# Patient Record
Sex: MALE | Race: WHITE | NOT HISPANIC OR LATINO | Employment: FULL TIME | URBAN - METROPOLITAN AREA
[De-identification: names, ages, dates, MRNs, and addresses within clinical notes are randomized per-mention and may not be internally consistent; named-entity substitution may affect disease eponyms.]

---

## 2017-05-31 ENCOUNTER — GENERIC CONVERSION - ENCOUNTER (OUTPATIENT)
Dept: OTHER | Facility: OTHER | Age: 52
End: 2017-05-31

## 2018-01-10 NOTE — MISCELLANEOUS
Provider Comments  Provider Comments:   PAtient did not show for this appointment      Signatures   Electronically signed by : Freya Schmidt MD; May 31 2017  4:18PM EST                       (Author)

## 2018-01-11 NOTE — RESULT NOTES
Verified Results  (1) CBC/PLT/DIFF [id number] 10:15AM Tiffanie Joseph    Order Number: [id number]     Test Name Result Flag Reference   WBC COUNT 5 77 Thousand/uL  4 31-10 16   RBC COUNT 4 83 Million/uL  3 88-5 62   HEMOGLOBIN 14 9 g/dL  12 0-17 0   HEMATOCRIT 42 1 %  36 5-49 3   MCV 87 fL  82-98   MCH 30 8 pg  26 8-34 3   MCHC 35 4 g/dL  31 4-37 4   RDW 13 4 %  11 6-15 1   MPV 10 0 fL  8 9-12 7   PLATELET COUNT 846 Thousands/uL  149-390   nRBC AUTOMATED 0 /100 WBCs     NEUTROPHILS RELATIVE PERCENT 52 %  43-75   LYMPHOCYTES RELATIVE PERCENT 33 %  14-44   MONOCYTES RELATIVE PERCENT 11 %  4-12   EOSINOPHILS RELATIVE PERCENT 3 %  0-6   BASOPHILS RELATIVE PERCENT 1 %  0-1   NEUTROPHILS ABSOLUTE COUNT 3 02 Thousands/?L  1 85-7 62   LYMPHOCYTES ABSOLUTE COUNT 1 90 Thousands/?L  0 60-4 47   MONOCYTES ABSOLUTE COUNT 0 66 Thousand/?L  0 17-1 22   EOSINOPHILS ABSOLUTE COUNT 0 15 Thousand/?L  0 00-0 61   BASOPHILS ABSOLUTE COUNT 0 03 Thousands/?L  0 00-0 10   - Patient Instructions: This bloodwork is non-fasting  Please drink two glasses of water morning of bloodwork  - Patient Instructions: This bloodwork is non-fasting  Please drink two glasses of water morning of bloodwork  (1) COMPREHENSIVE METABOLIC PANEL [id number] 78:61DM Pablo Weber Order Number: [id number]     Test Name Result Flag Reference   GLUCOSE,RANDM 100 mg/dL     If the patient is fasting, the ADA then defines impaired fasting glucose as > 100 mg/dL and diabetes as > or equal to 123 mg/dL     SODIUM 136 mmol/L  136-145   POTASSIUM 4 1 mmol/L  3 5-5 3   CHLORIDE 104 mmol/L  100-108   CARBON DIOXIDE 25 mmol/L  21-32   ANION GAP (CALC) 7 mmol/L  4-13   BLOOD UREA NITROGEN 14 mg/dL  5-25   CREATININE 0 94 mg/dL  0 60-1 30   Standardized to IDMS reference method   CALCIUM 9 1 mg/dL  8 3-10 1   BILI, TOTAL 0 95 mg/dL  0 20-1 00   ALK PHOSPHATAS 40 U/L L    ALT (SGPT) 35 U/L  12-78   AST(SGOT) 24 U/L  5-45   ALBUMIN 3 9 g/dL  3 5-5 0   TOTAL PROTEIN 7 7 g/dL  6 4-8 2   eGFR Non-African American      >60 0 ml/min/1 73sq m   - Patient Instructions: This is a fasting blood test  Water, black tea or black coffee only after 9:00pm the night before test Drink 2 glasses of water the morning of test   National Kidney Disease Education Program recommendations are as follows:  GFR calculation is accurate only with a steady state creatinine  Chronic Kidney disease less than 60 ml/min/1 73 sq  meters  Kidney failure less than 15 ml/min/1 73 sq  meters  (1) LIPID PANEL FASTING W DIRECT LDL REFLEX [id number] 10:15AM Sanjiv [facility]oft Order Number: [id number]     Test Name Result Flag Reference   CHOLESTEROL 194 mg/dL     LDL CHOLESTEROL CALCULATED 108 mg/dL H 0-100   - Patient Instructions: This is a fasting blood test  Water, black tea or black coffee only after 9:00pm the night before test   Drink 2 glasses of water the morning of test     - Patient Instructions: This is a fasting blood test  Water, black tea or black coffee only after 9:00pm the night before test Drink 2 glasses of water the morning of test   Triglyceride:         Normal              <150 mg/dl       Borderline High    150-199 mg/dl       High               200-499 mg/dl       Very High          >499 mg/dl  Cholesterol:         Desirable        <200 mg/dl      Borderline High  200-239 mg/dl      High             >239 mg/dl  HDL Cholesterol:        High    >59 mg/dL      Low     <41 mg/dL  LDL Cholesterol:        Optimal          <100 mg/dl        Near Optimal     100-129 mg/dl        Above Optimal          Borderline High   130-159 mg/dl          High              160-189 mg/dl          Very High        >189 mg/dl  LDL CALCULATED:    This screening LDL is a calculated result  It does not have the accuracy of the Direct Measured LDL in the monitoring of patients with hyperlipidemia and/or statin therapy     Direct Measure LDL (QGG176) must be ordered separately in these patients  TRIGLYCERIDES 195 mg/dL H <=150   Specimen collection should occur prior to N-Acetylcysteine or Metamizole administration due to the potential for falsely depressed results  HDL,DIRECT 47 mg/dL  40-60   Specimen collection should occur prior to Metamizole administration due to the potential for falsely depressed results  (1) PSA (SCREEN) (Dx V76 44 Screen for Prostate Cancer) [id number] 10:15AM [facility] Order Number: [id number]     Test Name Result Flag Reference   PROSTATE SPECIFIC ANTIGEN 0 4 ng/mL  0 0-4 0   - Patient Instructions: This test is non-fasting  Please drink two glasses of water morning of bloodwork  - Patient Instructions: This test is non-fasting  Please drink two glasses of water morning of bloodwork         Discussion/Summary   cbc is normal cmp is normal liver and kidney function lipids are slightly elevated ldl watch diet especially carbohydrates  and exercise psa is normal

## 2018-01-15 NOTE — PROGRESS NOTES
Assessment    1  Benign essential hypertension (401 1) (I10)   2  Screen for colon cancer (V76 51) (Z12 11)   3  Screening PSA (prostate specific antigen) (V76 44) (Z12 5)   4  Alcohol dependence, continuous drinking behavior (303 91) (F10 20)    Plan  Alcohol dependence, continuous drinking behavior    · US LIVER; Status:Hold For - Scheduling; Requested for:58Kkt9612;   Benign essential hypertension    · Lisinopril 10 MG Oral Tablet; take 1 tablet every day   · EKG/ECG- POC; Status:Complete;   Done: 93CVM6483 08:46AM   · Begin a limited exercise program ; Status:Complete;   Done: 58DGN0670   · Eat no more than 30 grams of fat per day ; Status:Complete;   Done: 20CCG6413   · Keep a diary of when and what you eat ; Status:Complete;   Done: 25XNX8100   · Limit your use of alcohol to 2 drinks or cans of beer a day ; Status:Complete;   Done:  69FQH7820   · Some eating tips that can help you lose weight ; Status:Complete;   Done: 65GAH8118   · We recommend that you bring your body mass index down to 26 ; Status:Complete;    Done: 53DBB4266  Benign essential hypertension, Screening PSA (prostate specific antigen)    · (1) CBC/PLT/DIFF; Status:Active; Requested for:12Wnw3221;    · (1) COMPREHENSIVE METABOLIC PANEL; Status:Active; Requested for:23Tdw7484;    · (1) LIPID PANEL FASTING W DIRECT LDL REFLEX; Status:Active; Requested  for:06Les6561;    · (1) PSA (SCREEN) (Dx V76 44 Screen for Prostate Cancer); Status:Active; Requested  for:52Kfy9970;   Screen for colon cancer    · 2 - *EYVAZ&REILLYCOL ( COLORECTAL SURGERY, GASTROENTEROLOGY) Physician  Referral  Consult  Status: Hold For - Scheduling   Requested for: 91FNE9557  Care Summary provided  : Yes    Discussion/Summary  Impression: health maintenance visit, healthy adult male  Currently, he eats a healthy diet and has an adequate exercise regimen  Prostate cancer screening: the risks and benefits of prostate cancer screening were discussed and PSA was ordered  Colorectal cancer screening: the risks and benefits of colorectal cancer screening were discussed and colonoscopy has been ordered  Screening lab work includes glucose and lipid profile  Advice and education were given regarding nutrition, aerobic exercise and alcohol use  Patient discussion: discussed with the patient  1  HTN IH EKG showing a normal sinus rhythm will start the Lisinopril 10 mg daily and come back next week for a blood pressure recheck   2  Screening CRC and labs ordered   3  Alcohol use discussed with years of drinking risks and benefits of continued drinking and risks of cirrhosis has not ever had any scans on his liver  Possible side effects of new medications were reviewed with the patient/guardian today  Chief Complaint  Check up      History of Present Illness  HM, Adult Male: The patient is being seen for a health maintenance evaluation  The last health maintenance visit was 5 year(s) ago  General Health: The patient's health since the last visit is described as good  He has regular dental visits  He denies vision problems  He denies hearing loss  Immunizations status: up to date  Lifestyle:  He consumes a diverse and healthy diet  He does not have any weight concerns  He exercises regularly  He does not use tobacco  He consumes alcohol  He reports frequent alcohol use and drinking 6 drinks per day  He typically drinks beer  Alcohol concern: tolerance to alcohol, but no attempts to cut alcohol use, no personal concern about alcohol use, no annoyance by criticism of alcohol use, no morning drinking, no family concern about alcohol use and does not feel guilty about alcohol use  He is not ready to quit drinking  He denies drug use  Reproductive health:  the patient is sexually active  birth control is not being practiced  He denies erectile dysfunction  Screening: cancer screening reviewed and current  Prostate cancer screening includes no previous evaluation   Colorectal cancer screening includes no previous screening  Metabolic screening includes uncertain timing of his last lipid profile and uncertain timing of his last glucose screening  Cardiovascular risk factors: hypertension and obesity  HPI: Patient here for a check up and has no present complaints  Patient reports that he does drink about a six pack a day for several years  l Stopped smoking about 25 years ago went on for about 10 years  Hypertension (Follow-Up): The patient presents for follow-up of essential hypertension  The patient states he has been doing poorly with his blood pressure control since the last visit  He has no comorbid illnesses  He has no significant interval events  Symptoms: The patient is currently asymptomatic  Associated symptoms include no headache, no focal neurologic deficits and no memory loss  Home monitoring: The patient checks his blood pressure regularly  Blood pressure control has been poor  Medications: The patient is not currently on any medications for his hypertension--lifestyle modification only  Disease Management: the patient is not doing well with his blood pressure goals  The patient is due for a lipid panel, a serum creatinine, an eye exam and an ECG  Review of Systems    Constitutional: No fever or chills, feels well, no tiredness, no recent weight gain or weight loss  Eyes: No complaints of eye pain, no red eyes, no discharge from eyes, no itchy eyes  ENT: no complaints of earache, no hearing loss, no nosebleeds, no nasal discharge, no sore throat, no hoarseness  Cardiovascular: as noted in HPI  Respiratory: No complaints of shortness of breath, no wheezing, no cough, no SOB on exertion, no orthopnea or PND  Gastrointestinal: No complaints of abdominal pain, no constipation, no nausea or vomiting, no diarrhea or bloody stools     Genitourinary: No complaints of dysuria, no incontinence, no hesitancy, no nocturia, no genital lesion, no testicular pain    Musculoskeletal: No complaints of arthralgia, no myalgias, no joint swelling or stiffness, no limb pain or swelling  Integumentary: No complaints of skin rash or skin lesions, no itching, no skin wound, no dry skin  Neurological: No compliants of headache, no confusion, no convulsions, no numbness or tingling, no dizziness or fainting, no limb weakness, no difficulty walking  Psychiatric: Is not suicidal, no sleep disturbances, no anxiety or depression, no change in personality, no emotional problems  Endocrine: No complaints of proptosis, no hot flashes, no muscle weakness, no erectile dysfunction, no deepening of the voice, no feelings of weakness  Hematologic/Lymphatic: No complaints of swollen glands, no swollen glands in the neck, does not bleed easily, no easy bruising  Active Problems    1  Benign essential hypertension (401 1) (I10)    Past Medical History    · History of Candidiasis, cutaneous (112 3) (B37 2)   · History of Cellulitis Of The Abdominal Wall (682 2)   · 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)   · History of Irreducible umbilical hernia (838 9) (K42 0)   · History of Pneumonia (V12 61)    Surgical History    · History of Shoulder Surgery   · History of Umbilical Hernia Repair    Social History    · Being A Social Drinker    Current Meds   1  Multi Vitamin Mens Oral Tablet; Therapy: (Recorded:52Dnb6502) to Recorded    Allergies    1  No Known Drug Allergies    Vitals   Recorded: 10FLK5978 85:61AZ   Systolic 089   Diastolic 367   Heart Rate 78   Temperature 96 6 F   O2 Saturation 97   Height 5 ft 11 in   Weight 259 lb 4 00 oz   BMI Calculated 36 16   BSA Calculated 2 35     Physical Exam    Constitutional   General appearance: No acute distress, well appearing and well nourished  obese  Eyes   Conjunctiva and lids: No swelling, erythema, or discharge  Pupils and irises: Equal, round and reactive to light      Ears, Nose, Mouth, and Throat   External inspection of ears and nose: Normal     Otoscopic examination: Tympanic membrance translucent with normal light reflex  Canals patent without erythema  Oropharynx: Normal with no erythema, edema, exudate or lesions  Pulmonary   Respiratory effort: No increased work of breathing or signs of respiratory distress  Auscultation of lungs: Clear to auscultation  Cardiovascular   Auscultation of heart: Normal rate and rhythm, normal S1 and S2, without murmurs  Examination of extremities for edema and/or varicosities: Normal     Abdomen   Abdomen: Non-tender, no masses  Liver and spleen: No hepatomegaly or splenomegaly  Lymphatic   Palpation of lymph nodes in neck: No lymphadenopathy  Musculoskeletal   Gait and station: Normal     Skin   Skin and subcutaneous tissue: Normal without rashes or lesions  Psychiatric   Orientation to person, place and time: Normal     Mood and affect: Normal        Results/Data  PHQ-9 Adult Depression Screening 75Ilu9265 09:10AM User, [employer]     Test Name Result Flag Reference   PHQ-9 Adult Depression Score 0     Q1: 0, Q2: 0, Q3: 0, Q4: 0, Q5: 0, Q6: 0, Q7: 0, Q8: 0, Q9: 0   PHQ-9 Adult Depression Screening Negative     PHQ-9 Difficulty Level Not difficult at all     PHQ-9 Severity No Depression       EKG/ECG- POC 54LAL8196 08:46AM Veto Gens     Test Name Result Flag Reference   EKG/ECG See Scanned Copy         Signatures   Electronically signed by : SHAWNEE Kwok;  Aug  5 2016  9:13AM EST                       (Author)    Electronically signed by : Laura Russell MD; Aug  5 2016 11:44AM EST                       (Co-author)

## 2018-01-17 NOTE — RESULT NOTES
Verified Results  [address] [place] [address] [id number] 08:57AM Vimal Docker Order Number: [id number]    - Patient Instructions: To schedule this appointment, please contact Central Scheduling at [phone]  Test Name Result Flag Reference   US ABDOMEN LIMITED (Report)     RIGHT UPPER QUADRANT ULTRASOUND     INDICATION: Alcohol dependence, uncomplicated  COMPARISON: None     TECHNIQUE:  Real-time ultrasound of the right upper quadrant was performed with a curvilinear transducer with both volumetric sweeps and still imaging techniques  FINDINGS:     PANCREAS: Visualized portions of the pancreas are within normal limits  AORTA AND IVC: Visualized portions are normal for patient age  LIVER:   Size: Enlarged  The liver measures 20 3 cm in the midclavicular line  Contour: Surface contour is smooth  Parenchyma: Increased echogenicity consistent with fatty infiltration versus hepatic parenchymal disease of other etiology  No evidence of suspicious mass  The main portal vein is patent and hepatopetal       BILIARY:   The gallbladder is normal in caliber  No wall thickening or pericholecystic fluid  No stones or sludge identified  No sonographic Booth's sign  No intrahepatic biliary dilatation  CBD measures 3 mm  No choledocholithiasis  KIDNEY:    Right kidney measures 11 6 x 6 6 cm  Within normal limits  ASCITES:  None  IMPRESSION:     Enlarged echogenic liver consistent with fatty infiltration versus hepatic parenchymal disease of other etiology  No focal mass detected         Workstation performed: [id number]     Signed by:   Joaquim Acosta DO   9/12/16       Discussion/Summary   some fatty liver disease but otherwsie is normal liver no masses

## 2018-06-04 ENCOUNTER — OFFICE VISIT (OUTPATIENT)
Dept: FAMILY MEDICINE CLINIC | Facility: CLINIC | Age: 53
End: 2018-06-04
Payer: COMMERCIAL

## 2018-06-04 VITALS
WEIGHT: 231 LBS | SYSTOLIC BLOOD PRESSURE: 160 MMHG | DIASTOLIC BLOOD PRESSURE: 100 MMHG | HEART RATE: 62 BPM | RESPIRATION RATE: 18 BRPM | HEIGHT: 71 IN | TEMPERATURE: 97.5 F | BODY MASS INDEX: 32.34 KG/M2 | OXYGEN SATURATION: 97 %

## 2018-06-04 DIAGNOSIS — Z12.5 PROSTATE CANCER SCREENING: ICD-10-CM

## 2018-06-04 DIAGNOSIS — E66.9 OBESITY (BMI 30-39.9): ICD-10-CM

## 2018-06-04 DIAGNOSIS — Z13.220 LIPID SCREENING: ICD-10-CM

## 2018-06-04 DIAGNOSIS — I10 ESSENTIAL HYPERTENSION: Primary | ICD-10-CM

## 2018-06-04 PROCEDURE — 99214 OFFICE O/P EST MOD 30 MIN: CPT | Performed by: NURSE PRACTITIONER

## 2018-06-04 RX ORDER — LISINOPRIL AND HYDROCHLOROTHIAZIDE 25; 20 MG/1; MG/1
1 TABLET ORAL DAILY
Qty: 30 TABLET | Refills: 0 | Status: SHIPPED | OUTPATIENT
Start: 2018-06-04 | End: 2018-07-01 | Stop reason: SDUPTHER

## 2018-06-04 NOTE — PATIENT INSTRUCTIONS
Hypertension- uncontrolled  Start combination pill  Limit salt  Daily exercise  Check labs  We will all you with results  Follow up in 3 weeks for BP check  DASH Eating Plan   WHAT YOU NEED TO KNOW:   What is the DASH Eating Plan? The DASH (Dietary Approaches to Stop Hypertension) Eating Plan is designed to help prevent or lower high blood pressure  It can also help to lower LDL (bad) cholesterol and decrease your risk for heart disease  The plan is low in sodium, sugar, unhealthy fats, and total fat  It is high in potassium, calcium, magnesium, and fiber  These nutrients are added when you eat more fruits, vegetables, and whole grains  What is my sodium limit for each day? Your dietitian will tell you how much sodium is safe for you to have each day  People with high blood pressure should have no more than 1,500 to 2,300 mg of sodium in a day  A teaspoon (tsp) of salt has 2,300 mg of sodium  This may seem like a difficult goal, but small changes to the foods you eat can make a big difference  Your healthcare provider or dietitian can help you create a meal plan that follows your sodium limit  How do I limit sodium? · Read food labels  Food labels can help you choose foods that are low in sodium  The amount of sodium is listed in milligrams (mg)  The % Daily Value (DV) column tells you how much of your daily needs are met by 1 serving of the food for each nutrient listed  Choose foods that have less than 5% of the DV of sodium  These foods are considered low in sodium  Foods that have 20% or more of the DV of sodium are considered high in sodium  Avoid foods that have more than 300 mg of sodium in each serving  Choose foods that say low-sodium, reduced-sodium, or no salt added on the food label  · Avoid salt  Do not salt food at the table, and add very little salt to foods during cooking  Use herbs and spices, such as onions, garlic, and salt-free seasonings to add flavor to foods   Try lemon or lime juice or vinegar to give foods a tart flavor  Use hot peppers or a small amount of hot pepper sauce to add a spicy flavor to foods  · Ask about salt substitutes  Ask your healthcare provider if you may use salt substitutes  Some salt substitutes have ingredients that can be harmful if you have certain health conditions  · Choose foods carefully at restaurants  Meals from restaurants, especially fast food restaurants, are often high in sodium  Some restaurants have nutrition information that tells you the amount of sodium in their foods  Ask to have your food prepared with less, or no salt  What should I know about fats? · Include healthy fats  Examples are unsaturated fats and omega-3 fatty acids  Unsaturated fats are found in soybean, canola, olive, or sunflower oil, and liquid and soft tub margarines  Omega-3 fatty acids are found in fatty fish, such as salmon, tuna, mackerel, and sardines  It is also found in flaxseed oil and ground flaxseed  · Avoid unhealthy fats  Do not eat unhealthy fats, such as saturated fats and trans fats  Saturated fats are found in foods that contain fat from animals  Examples are fatty meats, whole milk, butter, cream, and other dairy foods  It is also found in shortening, stick margarine, palm oil, and coconut oil  Trans fats are found in fried foods, crackers, chips, and baked goods made with margarine or shortening  Which foods should I include? With the DASH eating plan, you need to eat a certain number of servings from each food group  This will help you get enough of certain nutrients and limit others  The amount of servings you should eat depends on how many calories you need  Your dietitian can tell you how many calories you need  The number of servings listed next to the food groups below are for people who need about 2,000 calories each day    · Grains:  6 to 8 servings (3 of these servings should be whole-grain foods)    ¨ 1 slice of whole-grain bread     ¨ 1 ounce of dry cereal    ¨ ½ cup of cooked cereal, pasta, or brown rice    · Vegetables and fruits:  4 to 5 servings of fruits and 4 to 5 servings of vegetables    ¨ 1 medium fruit    ¨ 1 cup of raw leafy vegetable    ¨ ½ cup of frozen, canned (no added salt), or chopped fresh vegetables     ¨ ½ cup of fresh, frozen, dried, or canned fruit (canned in light syrup or fruit juice)    ¨ ½ cup of vegetable or fruit juice    · Dairy:  2 to 3 servings    ¨ 1 cup of nonfat (skim) or 1% milk    ¨ 1½ ounces of fat-free or low-fat, low-sodium cheese    ¨ 6 ounces of nonfat or low-fat yogurt    · Lean meat, poultry, and fish:  6 ounces or less    [employer] (chicken, turkey) with no skin    ¨ Fish (especially fatty fish, such as salmon, fresh tuna, or mackerel)    ¨ Lean beef and pork (loin, round, extra lean hamburger)    ¨ Egg whites and egg substitutes    · Nuts, seeds, and legumes:  4 to 5 servings each week    ¨ ½ cup of cooked beans and peas    ¨ 1½ ounces of unsalted nuts    ¨ 2 tablespoons of peanut butter or seeds    · Sweets and added sugars:  5 or less each week    ¨ 1 tablespoon of sugar, jelly, or jam    ¨ ½ cup of sorbet or gelatin    ¨ 1 cup of lemonade    · Fats:  2 to 3 servings each week    ¨ 1 teaspoon of soft margarine or vegetable oil    ¨ 1 tablespoon of mayonnaise    ¨ 2 tablespoons of salad dressing  Which foods should I avoid?    · Grains:      [employer], such as doughnuts, pastries, cookies, and biscuits (high in fat and sugar)    ¨ Mixes for cornbread and biscuits, packaged foods, such as bread stuffing, rice and pasta mixes, macaroni and cheese, and instant cereals (high in sodium)    · Fruits and vegetables:      ¨ Regular, canned vegetables (high in sodium)    ¨ Sauerkraut, pickled vegetables, and other foods prepared in brine (high in sodium)    ¨ Fried vegetables or vegetables in butter or high-fat sauces    ¨ Fruit in cream or butter sauce (high in fat)    · Dairy: ¨ Whole milk, 2% milk, and cream (high in fat)    ¨ Regular cheese and processed cheese (high in fat and sodium)    · Meats and protein foods:      ¨ Smoked or cured meat, such as corned beef, de los santos, ham, hot dogs, and sausage (high in fat and sodium)    ¨ Canned beans and canned meats or spreads, such as potted meats, sardines, anchovies, and imitation seafood (high in sodium)    ¨ Deli or lunch meats, such as bologna, ham, turkey, and roast beef (high in sodium)    ¨ High-fat meat (T-bone steak, regular hamburger, and ribs)    ¨ Whole eggs and egg yolks (high in fat)    · Other:      ¨ Seasonings made with salt, such as garlic salt, celery salt, onion salt, seasoned salt, meat tenderizers, and monosodium glutamate (MSG)    ¨ Miso soup and canned or dried soup mixes (high in sodium)    ¨ Regular soy sauce, barbecue sauce, teriyaki sauce, steak sauce, Worcestershire sauce, and most flavored vinegars (high in sodium)    ¨ Regular condiments, such as mustard, ketchup, and salad dressings (high in sodium)    ¨ Gravy and sauces, such as Daniel or cheese sauces (high in sodium and fat)    ¨ Drinks high in sugar, such as soda or fruit drinks    [employer] foods, such as salted chips, popcorn, pretzels, pork rinds, salted crackers, and salted nuts    ¨ Frozen foods, such as dinners, entrees, vegetables with sauces, and breaded meats (high in sodium)  What other guidelines should I follow? · Maintain a healthy weight  Your risk for heart disease is higher if you are overweight  Your healthcare provider may suggest that you lose weight if you are overweight  You can lose weight by eating fewer calories and foods that have added sugars and fat  The DASH meal plan can help you do this  Decrease calories by eating smaller portions at each meal and fewer snacks  Ask your healthcare provider for more information about how to lose weight  · Exercise regularly    Regular exercise can help you reach or maintain a healthy weight  Regular exercise can also help decrease your blood pressure and improve your cholesterol levels  Get 30 minutes or more of moderate exercise each day of the week  To lose weight, get at least 60 minutes of exercise  Talk to your healthcare provider about the best exercise program for you  · Limit alcohol  Women should limit alcohol to 1 drink a day  Men should limit alcohol to 2 drinks a day  A drink of alcohol is 12 ounces of beer, 5 ounces of wine, or 1½ ounces of liquor  Where can I find more information? · [facility] and [address]  [address] [address]  [place] [address]  Phone: [phone]  Web Address: [facility] no  CARE AGREEMENT:   You have the right to help plan your care  Discuss treatment options with your caregivers to decide what care you want to receive  You always have the right to refuse treatment  The above information is an  only  It is not intended as medical advice for individual conditions or treatments  Talk to your doctor, nurse or pharmacist before following any medical regimen to see if it is safe and effective for you  © 2017 [employer] Information is for End User's use only and may not be sold, redistributed or otherwise used for commercial purposes  All illustrations and images included in CareNotes® are the copyrighted property of A D A M , Inc  or Angel Sandoval

## 2018-06-04 NOTE — PROGRESS NOTES
Assessment/Plan:    No problem-specific Assessment & Plan notes found for this encounter  Diagnoses and all orders for this visit:    Essential hypertension  -     Comprehensive metabolic panel; Future  -     Lipid panel; Future  -     PSA, Total Screen; Future  -     lisinopril-hydrochlorothiazide (PRINZIDE,ZESTORETIC) 20-25 MG per tablet; Take 1 tablet by mouth daily for 30 days    Obesity (BMI 30-39 9)  -     Comprehensive metabolic panel; Future  -     Lipid panel; Future  -     PSA, Total Screen; Future    Lipid screening  -     Comprehensive metabolic panel; Future  -     Lipid panel; Future  -     PSA, Total Screen; Future    Prostate cancer screening  -     Comprehensive metabolic panel; Future  -     Lipid panel; Future  -     PSA, Total Screen; Future          Subjective:      Patient ID: Trang Yen is a 47 y o  male  Pt here for follow up for htn  Pt was started on BP meds over a year ago  BP is not controlled  He has been having intermittent headaches  He also has been having dizziness and his equilibrium   Over the past year, 4-5 episodes of vomitting after eating a lunch meal at work  This would happen within 2 hours of consuming a meal  He did change his diet after the last episode  He quit drinking alcohol  He lost 40 pounds in three months by cutting out alcohol and making minor dietary changes  He does not exercise  The following portions of the patient's history were reviewed and updated as appropriate:   He  has a past medical history of Hypertension  He   Patient Active Problem List    Diagnosis Date Noted    Essential hypertension 06/04/2018    Obesity (BMI 30-39 9) 06/04/2018    Lipid screening 06/04/2018    Prostate cancer screening 06/04/2018     He  has a past surgical history that includes Shoulder surgery; Hernia repair; and Colonoscopy (N/A, 9/16/2016)  His family history is not on file  He  reports that he has never smoked   He has never used smokeless tobacco  He reports that he does not drink alcohol or use drugs  Current Outpatient Prescriptions   Medication Sig Dispense Refill    lisinopril-hydrochlorothiazide (PRINZIDE,ZESTORETIC) 20-25 MG per tablet Take 1 tablet by mouth daily for 30 days 30 tablet 0     No current facility-administered medications for this visit  Current Outpatient Prescriptions on File Prior to Visit   Medication Sig    [DISCONTINUED] lisinopril (ZESTRIL) 10 mg tablet Take 10 mg by mouth daily  No current facility-administered medications on file prior to visit  He has No Known Allergies       Review of Systems   Constitutional: Negative for fatigue and fever  HENT: Negative for congestion  Eyes: Negative for visual disturbance  Respiratory: Negative for cough and shortness of breath  Cardiovascular: Negative for chest pain, palpitations and leg swelling  Gastrointestinal: Negative for abdominal distention and abdominal pain  Endocrine: Negative for cold intolerance, polydipsia and polyuria  Genitourinary: Negative for difficulty urinating  Musculoskeletal: Negative for back pain and joint swelling  Skin: Negative for color change and rash  Allergic/Immunologic: Negative for immunocompromised state  Neurological: Negative for dizziness and headaches  Hematological: Negative for adenopathy  Psychiatric/Behavioral: Negative for behavioral problems and sleep disturbance  All other systems reviewed and are negative  Objective:      /100 (BP Location: Right arm, Patient Position: Sitting)   Pulse 62   Temp 97 5 °F (36 4 °C)   Resp 18   Ht 5' 11" (1 803 m)   Wt 105 kg (231 lb)   SpO2 97%   BMI 32 22 kg/m²          Physical Exam   Constitutional: He is oriented to person, place, and time  He appears well-developed and well-nourished  HENT:   Head: Normocephalic and atraumatic     Mouth/Throat: Oropharynx is clear and moist    Eyes: Conjunctivae and EOM are normal  Pupils are equal, round, and reactive to light  Neck: Normal range of motion  Cardiovascular: Normal rate, regular rhythm and normal heart sounds  Exam reveals no gallop and no friction rub  No murmur heard  Pulmonary/Chest: Effort normal and breath sounds normal  No respiratory distress  Abdominal: Soft  There is no tenderness  Musculoskeletal: Normal range of motion  Lymphadenopathy:     He has no cervical adenopathy  Neurological: He is alert and oriented to person, place, and time  Skin: Skin is warm and dry  Psychiatric: He has a normal mood and affect  His behavior is normal  Judgment and thought content normal    Nursing note and vitals reviewed

## 2018-07-01 DIAGNOSIS — I10 ESSENTIAL HYPERTENSION: ICD-10-CM

## 2018-07-02 RX ORDER — LISINOPRIL AND HYDROCHLOROTHIAZIDE 25; 20 MG/1; MG/1
TABLET ORAL
Qty: 30 TABLET | Refills: 0 | Status: SHIPPED | OUTPATIENT
Start: 2018-07-02 | End: 2018-08-08 | Stop reason: SDUPTHER

## 2018-08-03 ENCOUNTER — OFFICE VISIT (OUTPATIENT)
Dept: FAMILY MEDICINE CLINIC | Facility: CLINIC | Age: 53
End: 2018-08-03
Payer: COMMERCIAL

## 2018-08-03 VITALS
DIASTOLIC BLOOD PRESSURE: 84 MMHG | SYSTOLIC BLOOD PRESSURE: 118 MMHG | TEMPERATURE: 97.9 F | WEIGHT: 221 LBS | OXYGEN SATURATION: 98 % | HEIGHT: 71 IN | BODY MASS INDEX: 30.94 KG/M2 | HEART RATE: 77 BPM

## 2018-08-03 DIAGNOSIS — I10 ESSENTIAL HYPERTENSION: Primary | ICD-10-CM

## 2018-08-03 DIAGNOSIS — Z12.2 ENCOUNTER FOR SCREENING FOR LUNG CANCER: ICD-10-CM

## 2018-08-03 DIAGNOSIS — Z11.59 ENCOUNTER FOR HEPATITIS C SCREENING TEST FOR LOW RISK PATIENT: ICD-10-CM

## 2018-08-03 DIAGNOSIS — Z12.5 SCREENING FOR PROSTATE CANCER: ICD-10-CM

## 2018-08-03 DIAGNOSIS — E66.9 OBESITY (BMI 30-39.9): ICD-10-CM

## 2018-08-03 PROBLEM — Z13.220 LIPID SCREENING: Status: RESOLVED | Noted: 2018-06-04 | Resolved: 2018-08-03

## 2018-08-03 PROCEDURE — 3079F DIAST BP 80-89 MM HG: CPT | Performed by: NURSE PRACTITIONER

## 2018-08-03 PROCEDURE — 99214 OFFICE O/P EST MOD 30 MIN: CPT | Performed by: NURSE PRACTITIONER

## 2018-08-03 PROCEDURE — 3074F SYST BP LT 130 MM HG: CPT | Performed by: NURSE PRACTITIONER

## 2018-08-03 PROCEDURE — 3008F BODY MASS INDEX DOCD: CPT | Performed by: NURSE PRACTITIONER

## 2018-08-03 PROCEDURE — 1036F TOBACCO NON-USER: CPT | Performed by: NURSE PRACTITIONER

## 2018-08-08 DIAGNOSIS — I10 ESSENTIAL HYPERTENSION: ICD-10-CM

## 2018-08-08 RX ORDER — LISINOPRIL AND HYDROCHLOROTHIAZIDE 25; 20 MG/1; MG/1
TABLET ORAL
Qty: 30 TABLET | Refills: 0 | Status: SHIPPED | OUTPATIENT
Start: 2018-08-08 | End: 2018-09-09 | Stop reason: SDUPTHER

## 2018-08-31 ENCOUNTER — APPOINTMENT (OUTPATIENT)
Dept: LAB | Facility: MEDICAL CENTER | Age: 53
End: 2018-08-31
Payer: COMMERCIAL

## 2018-08-31 ENCOUNTER — HOSPITAL ENCOUNTER (OUTPATIENT)
Dept: RADIOLOGY | Facility: MEDICAL CENTER | Age: 53
Discharge: HOME | End: 2018-08-31
Payer: COMMERCIAL

## 2018-08-31 DIAGNOSIS — Z12.5 PROSTATE CANCER SCREENING: ICD-10-CM

## 2018-08-31 DIAGNOSIS — Z12.5 SCREENING FOR PROSTATE CANCER: ICD-10-CM

## 2018-08-31 DIAGNOSIS — R76.8 POSITIVE HEPATITIS C ANTIBODY TEST: Primary | ICD-10-CM

## 2018-08-31 DIAGNOSIS — Z13.220 LIPID SCREENING: ICD-10-CM

## 2018-08-31 DIAGNOSIS — E66.9 OBESITY (BMI 30-39.9): ICD-10-CM

## 2018-08-31 DIAGNOSIS — Z12.2 ENCOUNTER FOR SCREENING FOR LUNG CANCER: ICD-10-CM

## 2018-08-31 DIAGNOSIS — Z11.59 ENCOUNTER FOR HEPATITIS C SCREENING TEST FOR LOW RISK PATIENT: ICD-10-CM

## 2018-08-31 DIAGNOSIS — I10 ESSENTIAL HYPERTENSION: ICD-10-CM

## 2018-08-31 LAB
ALBUMIN SERPL BCP-MCNC: 3.8 G/DL (ref 3.5–5)
ALP SERPL-CCNC: 31 U/L (ref 46–116)
ALT SERPL W P-5'-P-CCNC: 25 U/L (ref 12–78)
ANION GAP SERPL CALCULATED.3IONS-SCNC: 7 MMOL/L (ref 4–13)
AST SERPL W P-5'-P-CCNC: 18 U/L (ref 5–45)
BASOPHILS # BLD AUTO: 0.02 THOUSANDS/ΜL (ref 0–0.1)
BASOPHILS NFR BLD AUTO: 0 % (ref 0–1)
BILIRUB SERPL-MCNC: 1.14 MG/DL (ref 0.2–1)
BUN SERPL-MCNC: 21 MG/DL (ref 5–25)
CALCIUM SERPL-MCNC: 9 MG/DL (ref 8.3–10.1)
CHLORIDE SERPL-SCNC: 103 MMOL/L (ref 100–108)
CHOLEST SERPL-MCNC: 163 MG/DL (ref 50–200)
CO2 SERPL-SCNC: 27 MMOL/L (ref 21–32)
CREAT SERPL-MCNC: 0.82 MG/DL (ref 0.6–1.3)
EOSINOPHIL # BLD AUTO: 0.13 THOUSAND/ΜL (ref 0–0.61)
EOSINOPHIL NFR BLD AUTO: 3 % (ref 0–6)
ERYTHROCYTE [DISTWIDTH] IN BLOOD BY AUTOMATED COUNT: 13 % (ref 11.6–15.1)
GFR SERPL CREATININE-BSD FRML MDRD: 102 ML/MIN/1.73SQ M
GLUCOSE P FAST SERPL-MCNC: 107 MG/DL (ref 65–99)
HCT VFR BLD AUTO: 43.2 % (ref 36.5–49.3)
HCV AB SER QL: ABNORMAL
HDLC SERPL-MCNC: 42 MG/DL (ref 40–60)
HGB BLD-MCNC: 14.7 G/DL (ref 12–17)
IMM GRANULOCYTES # BLD AUTO: 0.01 THOUSAND/UL (ref 0–0.2)
IMM GRANULOCYTES NFR BLD AUTO: 0 % (ref 0–2)
LDLC SERPL CALC-MCNC: 101 MG/DL (ref 0–100)
LYMPHOCYTES # BLD AUTO: 1.76 THOUSANDS/ΜL (ref 0.6–4.47)
LYMPHOCYTES NFR BLD AUTO: 35 % (ref 14–44)
MCH RBC QN AUTO: 29.4 PG (ref 26.8–34.3)
MCHC RBC AUTO-ENTMCNC: 34 G/DL (ref 31.4–37.4)
MCV RBC AUTO: 86 FL (ref 82–98)
MONOCYTES # BLD AUTO: 0.76 THOUSAND/ΜL (ref 0.17–1.22)
MONOCYTES NFR BLD AUTO: 15 % (ref 4–12)
NEUTROPHILS # BLD AUTO: 2.39 THOUSANDS/ΜL (ref 1.85–7.62)
NEUTS SEG NFR BLD AUTO: 47 % (ref 43–75)
NRBC BLD AUTO-RTO: 0 /100 WBCS
PLATELET # BLD AUTO: 202 THOUSANDS/UL (ref 149–390)
PMV BLD AUTO: 10 FL (ref 8.9–12.7)
POTASSIUM SERPL-SCNC: 3.9 MMOL/L (ref 3.5–5.3)
PROT SERPL-MCNC: 7.3 G/DL (ref 6.4–8.2)
PSA SERPL-MCNC: 0.5 NG/ML (ref 0–4)
RBC # BLD AUTO: 5 MILLION/UL (ref 3.88–5.62)
SODIUM SERPL-SCNC: 137 MMOL/L (ref 136–145)
TRIGL SERPL-MCNC: 100 MG/DL
WBC # BLD AUTO: 5.07 THOUSAND/UL (ref 4.31–10.16)

## 2018-08-31 PROCEDURE — 85025 COMPLETE CBC W/AUTO DIFF WBC: CPT

## 2018-08-31 PROCEDURE — 80053 COMPREHEN METABOLIC PANEL: CPT

## 2018-08-31 PROCEDURE — 80061 LIPID PANEL: CPT

## 2018-08-31 PROCEDURE — 86803 HEPATITIS C AB TEST: CPT

## 2018-08-31 PROCEDURE — 36415 COLL VENOUS BLD VENIPUNCTURE: CPT

## 2018-08-31 PROCEDURE — G0103 PSA SCREENING: HCPCS

## 2018-09-09 DIAGNOSIS — I10 ESSENTIAL HYPERTENSION: ICD-10-CM

## 2018-09-10 RX ORDER — LISINOPRIL AND HYDROCHLOROTHIAZIDE 25; 20 MG/1; MG/1
TABLET ORAL
Qty: 30 TABLET | Refills: 0 | Status: SHIPPED | OUTPATIENT
Start: 2018-09-10 | End: 2018-10-14 | Stop reason: SDUPTHER

## 2018-10-14 DIAGNOSIS — I10 ESSENTIAL HYPERTENSION: ICD-10-CM

## 2018-10-14 RX ORDER — LISINOPRIL AND HYDROCHLOROTHIAZIDE 25; 20 MG/1; MG/1
TABLET ORAL
Qty: 30 TABLET | Refills: 0 | Status: SHIPPED | OUTPATIENT
Start: 2018-10-14 | End: 2018-11-12 | Stop reason: SDUPTHER

## 2018-10-15 ENCOUNTER — OFFICE VISIT (OUTPATIENT)
Dept: FAMILY MEDICINE CLINIC | Facility: CLINIC | Age: 53
End: 2018-10-15
Payer: COMMERCIAL

## 2018-10-15 VITALS
HEIGHT: 71 IN | TEMPERATURE: 97.9 F | RESPIRATION RATE: 16 BRPM | BODY MASS INDEX: 31.67 KG/M2 | OXYGEN SATURATION: 96 % | SYSTOLIC BLOOD PRESSURE: 124 MMHG | HEART RATE: 74 BPM | DIASTOLIC BLOOD PRESSURE: 76 MMHG | WEIGHT: 226.2 LBS

## 2018-10-15 DIAGNOSIS — B08.4 HAND, FOOT AND MOUTH DISEASE: Primary | ICD-10-CM

## 2018-10-15 PROCEDURE — 99213 OFFICE O/P EST LOW 20 MIN: CPT | Performed by: NURSE PRACTITIONER

## 2018-10-15 PROCEDURE — 3008F BODY MASS INDEX DOCD: CPT | Performed by: NURSE PRACTITIONER

## 2018-10-15 NOTE — PROGRESS NOTES
Assessment/Plan:    No problem-specific Assessment & Plan notes found for this encounter  Diagnoses and all orders for this visit:    Hand, foot and mouth disease  Comments:  DW patient symptoms are viral and treatment is supportive and good hand washing           Subjective:      Patient ID: Lizandro Mosley is a 47 y o  male  Patient here with complaints that started with sore throat and hurting to swallow and touching his throat no other symptoms no sick contacts started yesterday       Sore Throat    Pertinent negatives include no abdominal pain, congestion, coughing, diarrhea, ear pain, shortness of breath or vomiting  The following portions of the patient's history were reviewed and updated as appropriate:   He  has a past medical history of Hypertension; Inner ear dysfunction; Irreducible umbilical hernia; and Pneumonia  He   Patient Active Problem List    Diagnosis Date Noted    Hand, foot and mouth disease 10/15/2018    Encounter for screening for lung cancer 08/03/2018    Essential hypertension 06/04/2018    Obesity (BMI 30-39 9) 06/04/2018    Screening for prostate cancer 06/04/2018     He  has a past surgical history that includes Shoulder surgery; Hernia repair; and Colonoscopy (N/A, 9/16/2016)  His family history is not on file  He  reports that he has quit smoking  He has never used smokeless tobacco  He reports that he does not drink alcohol or use drugs  He has No Known Allergies       Review of Systems   Constitutional: Negative for activity change, appetite change, chills, diaphoresis, fatigue, fever and unexpected weight change  HENT: Positive for sore throat  Negative for congestion, ear pain, hearing loss, postnasal drip, sinus pain, sinus pressure and sneezing  Eyes: Negative for pain, redness and visual disturbance  Respiratory: Negative for cough and shortness of breath  Cardiovascular: Negative for chest pain and leg swelling     Gastrointestinal: Negative for abdominal pain, diarrhea, nausea and vomiting  Musculoskeletal: Negative for arthralgias  Neurological: Negative for dizziness and light-headedness  Psychiatric/Behavioral: Negative for behavioral problems and dysphoric mood  Objective:      /76 (BP Location: Left arm, Patient Position: Sitting, Cuff Size: Adult)   Pulse 74   Temp 97 9 °F (36 6 °C) (Tympanic)   Resp 16   Ht 5' 11" (1 803 m)   Wt 103 kg (226 lb 3 2 oz)   SpO2 96%   BMI 31 55 kg/m²          Physical Exam   Constitutional: He is oriented to person, place, and time  Vital signs are normal  He appears well-developed and well-nourished  No distress  HENT:   Head: Normocephalic and atraumatic  Mouth/Throat:       Eyes: Pupils are equal, round, and reactive to light  Neck: Normal range of motion  No thyromegaly present  Cardiovascular: Normal rate, regular rhythm, normal heart sounds and intact distal pulses  No murmur heard  Pulmonary/Chest: Effort normal and breath sounds normal  No respiratory distress  He has no wheezes  Abdominal: Soft  Bowel sounds are normal    Musculoskeletal: Normal range of motion  Neurological: He is alert and oriented to person, place, and time  Skin: Skin is warm and dry  Psychiatric: He has a normal mood and affect  Nursing note and vitals reviewed

## 2018-11-12 DIAGNOSIS — I10 ESSENTIAL HYPERTENSION: ICD-10-CM

## 2018-11-12 RX ORDER — LISINOPRIL AND HYDROCHLOROTHIAZIDE 25; 20 MG/1; MG/1
TABLET ORAL
Qty: 30 TABLET | Refills: 0 | Status: SHIPPED | OUTPATIENT
Start: 2018-11-12 | End: 2018-12-09 | Stop reason: SDUPTHER

## 2018-12-09 DIAGNOSIS — I10 ESSENTIAL HYPERTENSION: ICD-10-CM

## 2018-12-10 RX ORDER — LISINOPRIL AND HYDROCHLOROTHIAZIDE 25; 20 MG/1; MG/1
TABLET ORAL
Qty: 30 TABLET | Refills: 0 | Status: SHIPPED | OUTPATIENT
Start: 2018-12-10 | End: 2019-01-08 | Stop reason: SDUPTHER

## 2019-01-08 DIAGNOSIS — I10 ESSENTIAL HYPERTENSION: ICD-10-CM

## 2019-01-08 RX ORDER — LISINOPRIL AND HYDROCHLOROTHIAZIDE 25; 20 MG/1; MG/1
TABLET ORAL
Qty: 30 TABLET | Refills: 0 | Status: SHIPPED | OUTPATIENT
Start: 2019-01-08 | End: 2019-02-21 | Stop reason: ALTCHOICE

## 2019-02-17 ENCOUNTER — APPOINTMENT (OUTPATIENT)
Dept: MRI IMAGING | Facility: HOSPITAL | Age: 54
End: 2019-02-17
Payer: COMMERCIAL

## 2019-02-17 ENCOUNTER — HOSPITAL ENCOUNTER (OUTPATIENT)
Facility: HOSPITAL | Age: 54
Setting detail: OBSERVATION
Discharge: HOME | End: 2019-02-18
Attending: EMERGENCY MEDICINE | Admitting: STUDENT IN AN ORGANIZED HEALTH CARE EDUCATION/TRAINING PROGRAM
Payer: COMMERCIAL

## 2019-02-17 ENCOUNTER — APPOINTMENT (EMERGENCY)
Dept: RADIOLOGY | Facility: HOSPITAL | Age: 54
End: 2019-02-17
Payer: COMMERCIAL

## 2019-02-17 ENCOUNTER — APPOINTMENT (EMERGENCY)
Dept: CT IMAGING | Facility: HOSPITAL | Age: 54
End: 2019-02-17
Payer: COMMERCIAL

## 2019-02-17 DIAGNOSIS — R29.90 STROKE-LIKE SYMPTOMS: Primary | ICD-10-CM

## 2019-02-17 DIAGNOSIS — R00.1 BRADYCARDIA: ICD-10-CM

## 2019-02-17 PROBLEM — R42 DIZZINESS: Status: ACTIVE | Noted: 2019-02-17

## 2019-02-17 PROBLEM — R20.0 BILATERAL NUMBNESS AND TINGLING OF ARMS AND LEGS: Status: ACTIVE | Noted: 2019-02-17

## 2019-02-17 PROBLEM — R20.2 BILATERAL NUMBNESS AND TINGLING OF ARMS AND LEGS: Status: ACTIVE | Noted: 2019-02-17

## 2019-02-17 LAB
ALBUMIN SERPL BCP-MCNC: 5.5 G/DL (ref 3.5–5)
ALP SERPL-CCNC: 32 U/L (ref 46–116)
ALT SERPL W P-5'-P-CCNC: 22 U/L (ref 12–78)
AMPHETAMINES SERPL QL SCN: NEGATIVE
ANION GAP BLD CALC-SCNC: 18 MMOL/L (ref 4–13)
ANION GAP SERPL CALCULATED.3IONS-SCNC: 16 MMOL/L (ref 4–13)
AST SERPL W P-5'-P-CCNC: 17 U/L (ref 5–45)
BARBITURATES UR QL: NEGATIVE
BASOPHILS # BLD AUTO: 0.03 THOUSANDS/ΜL (ref 0–0.1)
BASOPHILS NFR BLD AUTO: 0 % (ref 0–1)
BENZODIAZ UR QL: NEGATIVE
BILIRUB SERPL-MCNC: 0.9 MG/DL (ref 0.2–1)
BUN BLD-MCNC: 16 MG/DL (ref 5–25)
BUN SERPL-MCNC: 18 MG/DL (ref 5–25)
CA-I BLD-SCNC: 1.26 MMOL/L (ref 1.12–1.32)
CALCIUM SERPL-MCNC: 9.8 MG/DL (ref 8.3–10.1)
CHLORIDE BLD-SCNC: 99 MMOL/L (ref 100–108)
CHLORIDE SERPL-SCNC: 102 MMOL/L (ref 100–108)
CO2 SERPL-SCNC: 23 MMOL/L (ref 21–32)
COCAINE UR QL: NEGATIVE
CREAT BLD-MCNC: 0.9 MG/DL (ref 0.6–1.3)
CREAT SERPL-MCNC: 1 MG/DL (ref 0.6–1.3)
EOSINOPHIL # BLD AUTO: 0.12 THOUSAND/ΜL (ref 0–0.61)
EOSINOPHIL NFR BLD AUTO: 2 % (ref 0–6)
ERYTHROCYTE [DISTWIDTH] IN BLOOD BY AUTOMATED COUNT: 12.4 % (ref 11.6–15.1)
GFR SERPL CREATININE-BSD FRML MDRD: 86 ML/MIN/1.73SQ M
GFR SERPL CREATININE-BSD FRML MDRD: 98 ML/MIN/1.73SQ M
GLUCOSE SERPL-MCNC: 148 MG/DL (ref 65–140)
GLUCOSE SERPL-MCNC: 168 MG/DL (ref 65–140)
HCT VFR BLD AUTO: 43.5 % (ref 36.5–49.3)
HCT VFR BLD CALC: 40 % (ref 36.5–49.3)
HGB BLD-MCNC: 14.9 G/DL (ref 12–17)
HGB BLDA-MCNC: 13.6 G/DL (ref 12–17)
IMM GRANULOCYTES # BLD AUTO: 0.02 THOUSAND/UL (ref 0–0.2)
IMM GRANULOCYTES NFR BLD AUTO: 0 % (ref 0–2)
LYMPHOCYTES # BLD AUTO: 3.18 THOUSANDS/ΜL (ref 0.6–4.47)
LYMPHOCYTES NFR BLD AUTO: 43 % (ref 14–44)
MAGNESIUM SERPL-MCNC: 1.8 MG/DL (ref 1.6–2.6)
MCH RBC QN AUTO: 29.1 PG (ref 26.8–34.3)
MCHC RBC AUTO-ENTMCNC: 34.3 G/DL (ref 31.4–37.4)
MCV RBC AUTO: 85 FL (ref 82–98)
METHADONE UR QL: NEGATIVE
MONOCYTES # BLD AUTO: 0.99 THOUSAND/ΜL (ref 0.17–1.22)
MONOCYTES NFR BLD AUTO: 14 % (ref 4–12)
NEUTROPHILS # BLD AUTO: 3 THOUSANDS/ΜL (ref 1.85–7.62)
NEUTS SEG NFR BLD AUTO: 41 % (ref 43–75)
NRBC BLD AUTO-RTO: 0 /100 WBCS
OPIATES UR QL SCN: NEGATIVE
PCO2 BLD: 28 MMOL/L (ref 21–32)
PCP UR QL: NEGATIVE
PLATELET # BLD AUTO: 217 THOUSANDS/UL (ref 149–390)
PMV BLD AUTO: 9.4 FL (ref 8.9–12.7)
POTASSIUM BLD-SCNC: 3.7 MMOL/L (ref 3.5–5.3)
POTASSIUM SERPL-SCNC: 2.9 MMOL/L (ref 3.5–5.3)
PROT SERPL-MCNC: 7.7 G/DL (ref 6.4–8.2)
RBC # BLD AUTO: 5.12 MILLION/UL (ref 3.88–5.62)
SODIUM BLD-SCNC: 140 MMOL/L (ref 136–145)
SODIUM SERPL-SCNC: 141 MMOL/L (ref 136–145)
SPECIMEN SOURCE: ABNORMAL
THC UR QL: POSITIVE
TROPONIN I SERPL-MCNC: <0.02 NG/ML
WBC # BLD AUTO: 7.34 THOUSAND/UL (ref 4.31–10.16)

## 2019-02-17 PROCEDURE — 83735 ASSAY OF MAGNESIUM: CPT | Performed by: INTERNAL MEDICINE

## 2019-02-17 PROCEDURE — 70498 CT ANGIOGRAPHY NECK: CPT

## 2019-02-17 PROCEDURE — 70551 MRI BRAIN STEM W/O DYE: CPT

## 2019-02-17 PROCEDURE — 93005 ELECTROCARDIOGRAM TRACING: CPT

## 2019-02-17 PROCEDURE — 85014 HEMATOCRIT: CPT

## 2019-02-17 PROCEDURE — 36415 COLL VENOUS BLD VENIPUNCTURE: CPT

## 2019-02-17 PROCEDURE — 71275 CT ANGIOGRAPHY CHEST: CPT

## 2019-02-17 PROCEDURE — 80307 DRUG TEST PRSMV CHEM ANLYZR: CPT | Performed by: INTERNAL MEDICINE

## 2019-02-17 PROCEDURE — 99243 OFF/OP CNSLTJ NEW/EST LOW 30: CPT | Performed by: PSYCHIATRY & NEUROLOGY

## 2019-02-17 PROCEDURE — 70496 CT ANGIOGRAPHY HEAD: CPT

## 2019-02-17 PROCEDURE — 99220 PR INITIAL OBSERVATION CARE/DAY 70 MINUTES: CPT | Performed by: INTERNAL MEDICINE

## 2019-02-17 PROCEDURE — 80047 BASIC METABLC PNL IONIZED CA: CPT

## 2019-02-17 PROCEDURE — 84484 ASSAY OF TROPONIN QUANT: CPT | Performed by: EMERGENCY MEDICINE

## 2019-02-17 PROCEDURE — 99285 EMERGENCY DEPT VISIT HI MDM: CPT

## 2019-02-17 PROCEDURE — 80053 COMPREHEN METABOLIC PANEL: CPT | Performed by: EMERGENCY MEDICINE

## 2019-02-17 PROCEDURE — 85025 COMPLETE CBC W/AUTO DIFF WBC: CPT | Performed by: EMERGENCY MEDICINE

## 2019-02-17 RX ORDER — POTASSIUM CHLORIDE 20 MEQ/1
40 TABLET, EXTENDED RELEASE ORAL ONCE
Status: COMPLETED | OUTPATIENT
Start: 2019-02-17 | End: 2019-02-17

## 2019-02-17 RX ORDER — ASPIRIN 81 MG/1
81 TABLET, CHEWABLE ORAL DAILY
Status: DISCONTINUED | OUTPATIENT
Start: 2019-02-17 | End: 2019-02-18 | Stop reason: HOSPADM

## 2019-02-17 RX ORDER — ATORVASTATIN CALCIUM 40 MG/1
40 TABLET, FILM COATED ORAL EVERY EVENING
Status: DISCONTINUED | OUTPATIENT
Start: 2019-02-17 | End: 2019-02-18

## 2019-02-17 RX ADMIN — IOHEXOL 120 ML: 350 INJECTION, SOLUTION INTRAVENOUS at 11:08

## 2019-02-17 RX ADMIN — ASPIRIN 81 MG 81 MG: 81 TABLET ORAL at 16:19

## 2019-02-17 RX ADMIN — ATORVASTATIN CALCIUM 40 MG: 40 TABLET, FILM COATED ORAL at 17:32

## 2019-02-17 RX ADMIN — POTASSIUM CHLORIDE 40 MEQ: 1500 TABLET, EXTENDED RELEASE ORAL at 16:19

## 2019-02-17 NOTE — H&P
History and Physical - [facility]    Patient Information: Daisy Santo 47 y o  male MRN: [id number]  Unit/Bed#: -01 Encounter: [id number]  Admitting Physician: Zulma Ferrell MD  PCP: SHAWNEE Sepulveda  Date of Admission:  02/17/19    Assessment/Plan:    Hospital Problem List:     Principal Problem:    Bilateral numbness and tingling of arms and legs  Active Problems:    Essential hypertension    Obesity (BMI 30-39  9)      Plan:    Bilateral numbness and tingling of arms and legs, dizziness, mild changes in speech  Rule out TIA  MRI of the brain was ordered in the ER and pending  Will place on stroke protocol  Check echocardiogram  Will check lipid profile  Start aspirin and statin  Serial neuro checks  Patient reported to ED nurse that he smoked marijuana prior to this  He denied to me  Will check urine drug screen    Hypertension  Patient on lisinopril hydrochlorothiazide, continue home dose  Was hypokalemic on admission ? Contributing to weakness    Hypokalemia secondary to diuretic induced  Replete and monitor  Check Mag levels    VTE Prophylaxis: Encourage ambulation  / sequential compression device   Code Status:  Full code  POLST: There is no POLST form on file for this patient (pre-hospital)    Anticipated Length of Stay:  Patient will be admitted on an Observation basis with an anticipated length of stay of  Less than 2 midnights  Justification for Hospital Stay: TIA w/u and MRI brain    Total Time for Visit, including Counseling / Coordination of Care: 30 minutes  Greater than 50% of this total time spent on direct patient counseling and coordination of care  Chief Complaint:   Numbness in left hand,    History of Present Illness:    Daisy Santo is a 47 y o  male with past medical history of hypertension, previous tobacco use who presents with multiple vague complaints    Patient reports that approximately 30 minutes prior to the arrival he is lying on the couch with his wife, noticed some numbness in his left hand acutely went up to involve his arm without weakness or paresthesias  He later noticed diffuse weakness both in upper and lower extremities, felt like something was wrong  He got up from the couch, was pacing around he was not able to talk fluently, felt something is not right  He felt that he is going to die, asked his wife to drive to emergency department  He describes as feeling off balance however denies any lightheaded sensation or vertigo  His symptoms persisted until he had his CT scan in ER and then gradually improved  Currently he feels back to his normal   No fevers or chills  No skin rash  No bowel or bladder problems  No recent acute illness  Review of Systems:    Review of Systems   Constitutional: Negative for chills and fever  Respiratory: Negative for cough, chest tightness and shortness of breath  Cardiovascular: Negative for chest pain and palpitations  Gastrointestinal: Negative for abdominal pain, constipation, diarrhea, nausea and vomiting  Neurological: Positive for dizziness, weakness, light-headedness and numbness  Negative for seizures  Past Medical and Surgical History:     Past Medical History:   Diagnosis Date    Hypertension     Inner ear dysfunction     Irreducible umbilical hernia     last assessed 21Oct2013    Pneumonia        Past Surgical History:   Procedure Laterality Date    COLONOSCOPY N/A 9/16/2016    Procedure: COLONOSCOPY;  Surgeon: Felipe Ann MD;  Location: [facility]; Service:     HERNIA REPAIR      SHOULDER SURGERY         Meds/Allergies:    Prior to Admission medications    Medication Sig Start Date End Date Taking? Authorizing Provider   lisinopril-hydrochlorothiazide (PRINZIDE,ZESTORETIC) 20-25 MG per tablet TAKE 1 TABLET BY MOUTH EVERY DAY 1/8/19  Yes SHAWNEE Peraza     I have reviewed home medications with patient personally      Allergies: No Known Allergies    Social History:     Marital Status: /Civil Union   Occupation: works out doors, heavy construction  Patient Pre-hospital Living Situation:   Patient Pre-hospital Level of Mobility:   Patient Pre-hospital Diet Restrictions:   Substance Use History:   Social History     Substance and Sexual Activity   Alcohol Use Not Currently    Comment: being a social drinker, per [facility]\""     Social History     Tobacco Use   Smoking Status Former Smoker   Smokeless Tobacco Never Used   Tobacco Comment    former smoker, per [employer]     Social History     Substance and Sexual Activity   Drug Use Yes    Types: Marijuana       Family History:    History reviewed  No pertinent family history  pt is adopted  Physical Exam:     Vitals:   Blood Pressure: 116/71 (02/17/19 1300)  Pulse: 65 (02/17/19 1300)  Temperature: (!) 97 4 °F (36 3 °C) (02/17/19 0938)  Respirations: 21 (02/17/19 1300)  Height: 5' 11" (180 3 cm) (02/17/19 5559)  Weight - Scale: 105 kg (231 lb 7 7 oz) (02/17/19 0937)  SpO2: 93 % (02/17/19 1300)    Physical Exam   Constitutional: He is oriented to person, place, and time  He appears well-developed and well-nourished  No distress  HENT:   Head: Normocephalic and atraumatic  Eyes: Pupils are equal, round, and reactive to light  EOM are normal    Neck: Normal range of motion  Neck supple  Cardiovascular: Normal rate, regular rhythm and normal heart sounds  Pulmonary/Chest: Effort normal and breath sounds normal  No respiratory distress  Abdominal: Soft  Bowel sounds are normal    Musculoskeletal: Normal range of motion  Neurological: He is alert and oriented to person, place, and time  No cranial nerve deficit or sensory deficit  He exhibits normal muscle tone  Coordination normal    Skin: Skin is warm and dry  He is not diaphoretic  Psychiatric: He has a normal mood and affect  Additional Data:     Lab Results: I have personally reviewed pertinent reports        Results from last 7 days   Lab Units 02/17/19  1025 02/17/19  0944   WBC Thousand/uL  --  7 34   HEMOGLOBIN g/dL  --  14 9   I STAT HEMOGLOBIN g/dl 13 6  --    HEMATOCRIT %  --  43 5   HEMATOCRIT, ISTAT % 40  --    PLATELETS Thousands/uL  --  217   NEUTROS PCT %  --  41*   LYMPHS PCT %  --  43   MONOS PCT %  --  14*   EOS PCT %  --  2     Results from last 7 days   Lab Units 02/17/19  1025 02/17/19  0944   POTASSIUM mmol/L  --  2 9*   CHLORIDE mmol/L  --  102   CO2 mmol/L  --  23   CO2, I-STAT mmol/L 28  --    BUN mg/dL  --  18   CREATININE mg/dL  --  1 00   CALCIUM mg/dL  --  9 8   ALK PHOS U/L  --  32*   ALT U/L  --  22   AST U/L  --  17   GLUCOSE, ISTAT mg/dl 148*  --            Imaging: I have personally reviewed pertinent reports  Cta Head And Neck With And Without Contrast    Result Date: 2/17/2019  Narrative: CTA NECK AND BRAIN WITH AND WITHOUT CONTRAST INDICATION: Stroke-like symptoms, COMPARISON:   Brain MRI 5/13/2012 and MRA 5/30/2012 TECHNIQUE:  Routine CT imaging of the Brain without contrast   Post contrast imaging was performed after administration of iodinated contrast through the neck and brain  Post contrast axial 0 625 mm images timed to opacify the arterial system  3D rendering was performed on an independent workstation  MIP reconstructions performed  Coronal reconstructions were performed of the noncontrast portion of the brain  Radiation dose length product (DLP) for this visit:  1337 mGy-cm   This examination, like all CT scans performed in the [facility], was performed utilizing techniques to minimize radiation dose exposure, including the use of iterative reconstruction and automated exposure control  IV Contrast:  120 mL of iohexol (OMNIPAQUE)  IMAGE QUALITY:   Diagnostic FINDINGS: NONCONTRAST BRAIN PARENCHYMA:  No intracranial masslike lesion, mass effect or midline shift  No CT signs of acute infarction  No acute parenchymal hemorrhage   VENTRICLES AND EXTRA-AXIAL SPACES:  Normal for the patient's age  VISUALIZED ORBITS AND PARANASAL SINUSES:  The orbits are unremarkable  There is a hyperattenuating cystic appearing lesion arising from the intra-sinus protruded root of 1st right maxillary molar tooth with distinct peripheral calcification measuring approximately 2 1 x 1 3 cm  There is also adjacent to the sinus mucosal thickening  CERVICAL VASCULATURE AORTIC ARCH AND GREAT VESSELS:  Normal aortic arch and great vessel origins  Normal visualized subclavian vessels  RIGHT VERTEBRAL ARTERY CERVICAL SEGMENT:  Normal origin  The vessel is normal in caliber throughout the neck  LEFT VERTEBRAL ARTERY CERVICAL SEGMENT:  Normal origin  The vessel is normal in caliber throughout the neck  RIGHT EXTRACRANIAL CAROTID SEGMENT:  Normal caliber common carotid artery  Normal bifurcation and cervical internal carotid artery  No stenosis or dissection  LEFT EXTRACRANIAL CAROTID SEGMENT:  Normal caliber common carotid artery  Minimal partially calcified subcutaneous plaque at the bifurcation without significant stenosis NASCET criteria was used to determine the degree of internal carotid artery diameter stenosis  INTRACRANIAL VASCULATURE INTERNAL CAROTID ARTERIES:  Normal enhancement of the intracranial portions of the internal carotid arteries  There is mild atherosclerotic narrowing of bilateral supraclinoid segments  Normal ophthalmic artery origins  Normal ICA terminus  ANTERIOR CIRCULATION:  Symmetric A1 segments and anterior cerebral arteries with normal enhancement  Normal anterior communicating artery  MIDDLE CEREBRAL ARTERY CIRCULATION:  M1 segment and middle cerebral artery branches demonstrate normal enhancement bilaterally  DISTAL VERTEBRAL ARTERIES:  Normal distal vertebral arteries  Posterior inferior cerebellar artery origins are normal  Normal vertebral basilar junction  BASILAR ARTERY:  Basilar artery is normal in caliber  Normal superior cerebellar arteries   POSTERIOR CEREBRAL ARTERIES: Small right P1 and persistent fetal origin of right posterior cerebral artery  Both arteries demonstrate normal enhancement  Small/hypoplastic left posterior communicating artery DURAL VENOUS SINUSES:  Normal  NON VASCULAR ANATOMY BONY STRUCTURES:  No acute osseous abnormality  SOFT TISSUES OF THE NECK:  Unremarkable THORACIC INLET:  Unremarkable  Impression: 1  No acute intracranial CT abnormality  2   Unremarkable CT angiogram of the head and neck  No critical stenosis  No aneurysm or AV malformation 3  Approximately 2 cm hyperattenuating cystic appearing lesion arising from intra-sinus protruded root of 1st right maxillary molar tooth with distinct peripheral calcification  Differential considerations include dentigerous cyst versus odontogenic keratocyst   Recommend ENT consultation for further assessment  The study was marked in EPIC for significant notification  Workstation performed: [id number]     Cta Chest Wo W Contrast    Result Date: 2/17/2019  Narrative: CT ANGIOGRAM OF THE CHEST, WITH IV CONTRAST INDICATION:  64-year-old man with stroke like symptoms and signs of impending doom COMPARISON: Chest CT 8/31/2018 TECHNIQUE:  CT angiogram examination of the chest was performed according to standard protocol  Contrast as well as noncontrast images were obtained  This examination, like all CT scans performed in the [facility], was performed utilizing techniques to minimize radiation dose exposure, including the use of iterative reconstruction and automated exposure control  3D reconstructions were performed an independent workstation, and are supplied for review  Rad dose 835 mGy-cm IV Contrast:  120 mL of iohexol (OMNIPAQUE)  FINDINGS: VASCULAR STRUCTURES:  The thoracic aorta has normal course, caliber and contour without evidence of aneurysm or dissection  Major arch vessels are widely patent  OTHER FINDINGS: HEART:  Normal cardiac size  No pericardial effusions    Coronary artery disease is present LUNGS:  Unremarkable  PLEURA:  No pleural effusion  MEDIASTINUM AND JUSTIN:  No mass or significant lymphadenopathy  CHEST WALL AND LOWER NECK:  Unremarkable  VISUALIZED STRUCTURES IN THE UPPER ABDOMEN:  Colonic diverticulosis  Impression: 1  Normal chest CTA  2   Colonic diverticulosis  Workstation performed: [id number]       EKG, Pathology, and Other Studies Reviewed on Admission:   · EKG: NSR and nonspecific changes in lateral leads    Allscripts / Epic Records Reviewed: Yes     ** Please Note: This note has been constructed using a voice recognition system   **

## 2019-02-17 NOTE — CONSULTS
Consultation - Neurology   Diane Varner 47 y o  male MRN: [id number]  Unit/Bed#: -01 Encounter: [id number]      Physician Requesting Consult: Larissa Damian MD  Inpatient consult to Neurology  Consult performed by: Yolanda Arizmendi MD  Consult ordered by: Kirsty Smith MD        Reason for Consult / Principal Problem: bilat ue/le tingling/numbness    Assessment:  Anxiety attack brought on by initial LUE compressive neuropathy  Pt has been under stress for the past week and this morning after feeling tingling/numbness in his distal LUE while watching TV on couch, he felt it spread to the other side, he felt like he was going to die, everything felt distant/muffled  Denies any palpitations  He denies any vasovagal type symptoms  Entirely nonfocal exam     Plan:  No further inpatient recs  Stable for dishcarge from neuro standpoint  He will need outpatient ENT evaluation for right dental cyst       HPI: Diane Varner is a 47 y o  male who has hx of htn and no prior cva/tia hx was lying on couch next to wife earlier this morning when he noticed left hand numbness that quickly traveled up this arm and then to the other hand  This was followed by diffuse weakness  He felt off balance but no vestibular symptoms or vertigo  He got up and paced around, not able to speak clearly and felt that he was going to die  Sometimes gradually improved here in the ED  Mri brain unremarkable  Ct chest unremarkable other than noted colonic diverticulosis  CTA head/neck didn't show any significant athero  He was found to be hypokalemic in the ED, potassium of 2 9  Lipid panel wnl  No clinical evidence of dehydration  No recent signs/symptoms of infection  UDS positive for THC  Afebrile, vitals stable          ROS:  Per 12 point review currently no symptoms      Historical Information   Past Medical History:   Diagnosis Date    Hypertension     Inner ear dysfunction     Irreducible umbilical hernia     last assessed 21Oct2013  Pneumonia      Past Surgical History:   Procedure Laterality Date    COLONOSCOPY N/A 9/16/2016    Procedure: COLONOSCOPY;  Surgeon: Rick Mart MD;  Location[facility]; Service:     HERNIA REPAIR      SHOULDER SURGERY       Social History   Social History     Tobacco Use   Smoking Status Former Smoker   Smokeless Tobacco Never Used   Tobacco Comment    former smoker, per [employer]     Social History     Substance and Sexual Activity   Alcohol Use Not Currently    [facility]     Social History     Substance and Sexual Activity   Drug Use Yes    Types: Marijuana       Family History: non-contributory      Meds/Allergies     No Known Allergies    all current active meds have been reviewed    Objective   Vitals:Blood pressure 114/59, pulse 89, temperature 98 78 °F (37 1 °C), temperature source Oral, resp  rate 15, height 5' 11" (1 803 m), weight 105 kg (231 lb 7 7 oz), SpO2 95 %  ,Body mass index is 32 29 kg/m²  No intake or output data in the 24 hours ending 02/17/19 1840        Physical Exam:   Physical Exam General appearance: alert, appears stated age and cooperative  Head: Normocephalic, without obvious abnormality, atraumatic  Lungs: clear to auscultation bilaterally  Heart: regular rate and rhythm    Neurologic:  Cognitive:  Mental status: Alert, orientedX3, thought content appropriate  CN: CNII-XII normal   Motor: normal tone and bulk  5 power ue/le bilat  Sensory: intact  X 4 limbs 4 mod inc vib and prop  Cerebellar: finger to nose, heel to shin no dysmetria or ataxia  DTR's: 2 ue bilat, patellars 3 bilat, ankles trace bilat  Plantars: downgoing bilat        Lab Results: I have personally reviewed pertinent reports        Imaging Studies: I have personally reviewed pertinent films in PACS    EKG, Pathology, and Other Studies: I have personally reviewed pertinent films in PACS

## 2019-02-17 NOTE — ED PROVIDER NOTES
History  Chief Complaint   Patient presents with    Dizziness     Pt states he was watching TV when he felt a numb sensation in left hand with dizziness and disorientation  52-year-old male with a history of hypertension previous tobacco use presenting with vague multiple complaints with his wife  Patient reports that approximately 30 minutes prior to arrival he is lying on the couch with his wife noticed that he had numbness in his left hand quickly went up to involve his arm without weakness or paresthesia, he states that something was wrong with his vision and he felt off 'like something was wrong', he got up from the couch, and has difficulty describing what happened next, his wife states that he was pacing around he was able to phonating fluently, he reported that he feels like he is going to die, he asked the wife to bring in the emergency department for further evaluation where he states that he feels something is not right but has difficulty describing it further  He denies a lightheaded sensation or vertigo but he states that he feels off, he denies current other neurologic complaints he denies having headache neck pain or stiffness current auditory visual or balance complaint current weakness paresthesias or anesthesia he denies having chest or back pain at any time nausea vomiting anorexia abdominal pain change in bowels or bladder joint pain swelling rashes history of similar anxiety or other associated symptoms  Complete review systems otherwise negative as noted          Prior to Admission Medications   Prescriptions Last Dose Informant Patient Reported?  Taking?   lisinopril-hydrochlorothiazide (PRINZIDE,ZESTORETIC) 20-25 MG per tablet 2/17/2019 at Unknown time  No Yes   Sig: TAKE 1 TABLET BY MOUTH EVERY DAY      Facility-Administered Medications: None       Past Medical History:   Diagnosis Date    Hypertension     Inner ear dysfunction     Irreducible umbilical hernia     last assessed [id number]    Pneumonia        Past Surgical History:   Procedure Laterality Date    COLONOSCOPY N/A 9/16/2016    Procedure: COLONOSCOPY;  Surgeon: Dia Washington MD;  Location: [facility]; Service:     HERNIA REPAIR      SHOULDER SURGERY         History reviewed  No pertinent family history  I have reviewed and agree with the history as documented  Social History     Tobacco Use    Smoking status: Former Smoker    Smokeless tobacco: Never Used    Tobacco comment: former smoker, per [employer]   Substance Use Topics    Alcohol use: Not Currently     Comment: being a social drinker, per [employer] Drug use: Yes     Types: Marijuana        Review of Systems   Constitutional: Negative for activity change, appetite change, chills and fever  HENT: Negative for rhinorrhea and sore throat  Eyes: Negative for pain, redness and visual disturbance  Respiratory: Negative for cough and shortness of breath  Cardiovascular: Negative for chest pain and palpitations  Gastrointestinal: Negative for abdominal pain, diarrhea, nausea and vomiting  Endocrine: Negative for polydipsia and polyphagia  Genitourinary: Negative for dysuria, frequency and urgency  Musculoskeletal: Negative for arthralgias, back pain and myalgias  Skin: Negative for color change and rash  Neurological: Negative for dizziness and weakness  Patient feels off, left arm numbness resolved   Hematological: Negative for adenopathy  Does not bruise/bleed easily  Psychiatric/Behavioral: Negative for agitation and behavioral problems  All other systems reviewed and are negative  Physical Exam  Physical Exam   Constitutional: He is oriented to person, place, and time  He appears well-developed and well-nourished  No distress  Sitting upright conversational no acute distress   HENT:   Head: Normocephalic and atraumatic  Eyes: Pupils are equal, round, and reactive to light   EOM are normal    Neck: Normal range of motion  Neck supple  No tracheal deviation present  Cardiovascular: Normal rate, regular rhythm and normal heart sounds  Exam reveals no gallop and no friction rub  No murmur heard  Pulmonary/Chest: Effort normal and breath sounds normal  He has no wheezes  He has no rales  Abdominal: Soft  Bowel sounds are normal  He exhibits no distension  There is no tenderness  There is no rebound and no guarding  Musculoskeletal: Normal range of motion  He exhibits no edema or tenderness  Neurological: He is alert and oriented to person, place, and time  No cranial nerve deficit  He exhibits normal muscle tone  Coordination normal    Muscle strength is 5/5 globally finger to nose pronator drift dysdiadochokinesis is intact patient is phonating fluently without dysarthria or aphasia visual fields are intact cranial nerves 2-12 were intact no nuchal rigidity or meningismus pupils are 3 mm equal reactive bilaterally   Skin: Skin is warm and dry  No rash noted  Psychiatric: He has a normal mood and affect  His behavior is normal    Nursing note and vitals reviewed        Vital Signs  ED Triage Vitals   Temperature Pulse Respirations Blood Pressure SpO2   02/17/19 0938 02/17/19 0937 02/17/19 0937 02/17/19 0937 02/17/19 0937   (!) 97 4 °F (36 3 °C) 92 18 163/82 99 %      Temp Source Heart Rate Source Patient Position - Orthostatic VS BP Location FiO2 (%)   02/17/19 1530 02/17/19 0937 02/17/19 0937 02/17/19 0937 --   Oral Monitor Lying Right arm       Pain Score       02/17/19 0937       No Pain           Vitals:    02/18/19 0307 02/18/19 0708 02/18/19 1100 02/18/19 1505   BP: 112/70 133/83 136/81 133/78   Pulse: 58 57 66 80   Patient Position - Orthostatic VS: Lying  Lying        Visual Acuity  Visual Acuity      Most Recent Value   L Pupil Size (mm)  3   R Pupil Size (mm)  3   L Pupil Shape  Round   R Pupil Shape  Round          ED Medications  Medications   iohexol (OMNIPAQUE) 350 MG/ML injection (MULTI-DOSE) 120 mL (120 mL Intravenous Given 2/17/19 1108)   potassium chloride (K-DUR,KLOR-CON) CR tablet 40 mEq (40 mEq Oral Given 2/17/19 1619)       Diagnostic Studies  Results Reviewed     Procedure Component Value Units Date/Time    Rapid drug screen, urine [[id number]]  (Abnormal) Collected:  02/17/19 1624    Lab Status:  Final result Specimen:  Urine, Clean Catch Updated:  02/17/19 1644     Amph/Meth UR Negative     Barbiturate Ur Negative     Benzodiazepine Urine Negative     Cocaine Urine Negative     Methadone Urine Negative     Opiate Urine Negative     PCP Ur Negative     THC Urine Positive    Narrative:       Presumptive report  If requested, specimen will be sent to reference lab for confirmation  FOR MEDICAL PURPOSES ONLY  IF CONFIRMATION NEEDED PLEASE CONTACT THE LAB WITHIN 5 DAYS    Drug Screen Cutoff Levels:  AMPHETAMINE/METHAMPHETAMINES  1000 ng/mL  BARBITURATES     200 ng/mL  BENZODIAZEPINES     200 ng/mL  COCAINE      300 ng/mL  METHADONE      300 ng/mL  OPIATES      300 ng/mL  PHENCYCLIDINE     25 ng/mL  THC       50 ng/mL    Magnesium [[id number]]  (Normal) Collected:  02/17/19 0944    Lab Status:  Final result Specimen:  Blood from Arm, Left Updated:  02/17/19 1542     Magnesium 1 8 mg/dL     Comprehensive metabolic panel [[id number]]  (Abnormal) Collected:  02/17/19 0944    Lab Status:  Final result Specimen:  Blood from Arm, Left Updated:  02/17/19 1115     Sodium 141 mmol/L      Potassium 2 9 mmol/L      Chloride 102 mmol/L      CO2 23 mmol/L      ANION GAP 16 mmol/L      BUN 18 mg/dL      Creatinine 1 00 mg/dL      Glucose 168 mg/dL      Calcium 9 8 mg/dL      AST 17 U/L      ALT 22 U/L      Alkaline Phosphatase 32 U/L      Total Protein 7 7 g/dL      Albumin 5 5 g/dL      Total Bilirubin 0 90 mg/dL      eGFR 86 ml/min/1 73sq m     Narrative:       National Kidney Disease Education Program recommendations are as follows:  GFR calculation is accurate only with a steady state creatinine  Chronic Kidney disease less than 60 ml/min/1 73 sq  meters  Kidney failure less than 15 ml/min/1 73 sq  meters  Troponin I [[id number]]  (Normal) Collected:  02/17/19 0944    Lab Status:  Final result Specimen:  Blood from Arm, Left Updated:  02/17/19 1044     Troponin I <0 02 ng/mL     POCT Chem 8+ [[id number]]  (Abnormal) Collected:  02/17/19 1025    Lab Status:  Final result Specimen:  Venous Updated:  02/17/19 1029     SODIUM, I-STAT 140 mmol/l      Potassium, i-STAT 3 7 mmol/L      Chloride, istat 99 mmol/L      CO2, i-STAT 28 mmol/L      Anion Gap, i-STAT 18 mmol/L      Calcium, Ionized i-STAT 1 26 mmol/L      BUN, I-STAT 16 mg/dl      Creatinine, i-STAT 0 9 mg/dl      eGFR 98 ml/min/1 73sq m      Glucose, i-STAT 148 mg/dl      Hct, i-STAT 40 %      Hgb, i-STAT 13 6 g/dl      Specimen Type VENOUS    CBC and differential [[id number]]  (Abnormal) Collected:  02/17/19 0944    Lab Status:  Final result Specimen:  Blood from Arm, Left Updated:  02/17/19 0953     WBC 7 34 Thousand/uL      RBC 5 12 Million/uL      Hemoglobin 14 9 g/dL      Hematocrit 43 5 %      MCV 85 fL      MCH 29 1 pg      MCHC 34 3 g/dL      RDW 12 4 %      MPV 9 4 fL      Platelets 629 Thousands/uL      nRBC 0 /100 WBCs      Neutrophils Relative 41 %      Immat GRANS % 0 %      Lymphocytes Relative 43 %      Monocytes Relative 14 %      Eosinophils Relative 2 %      Basophils Relative 0 %      Neutrophils Absolute 3 00 Thousands/µL      Immature Grans Absolute 0 02 Thousand/uL      Lymphocytes Absolute 3 18 Thousands/µL      Monocytes Absolute 0 99 Thousand/µL      Eosinophils Absolute 0 12 Thousand/µL      Basophils Absolute 0 03 Thousands/µL                  MRI brain wo contrast   Final Result by Huong Sanchez MD (02/17 1710)      No acute findings  Workstation performed: [id number]         CTA head and neck with and without contrast   Final Result by Audrey Jones MD (02/17 1216)      1  No acute intracranial CT abnormality        2   Unremarkable CT angiogram of the head and neck  No critical stenosis  No aneurysm or AV malformation      3  Approximately 2 cm hyperattenuating cystic appearing lesion arising from intra-sinus protruded root of 1st right maxillary molar tooth with distinct peripheral calcification  Differential considerations include dentigerous cyst versus odontogenic    keratocyst   Recommend ENT consultation for further assessment  The study was marked in EPIC for significant notification  Workstation performed: [id number]         CTA chest wo w contrast   Final Result by Maxime Cuellar MD (02/17 1155)   1  Normal chest CTA  2   Colonic diverticulosis           Workstation performed: [id number]                    Procedures  Procedures       Phone Contacts  ED Phone Contact    ED Course  ED Course as of Feb 18 9377   Bart Griffitha Feb 17, 2019   1013 Patient is not a candidate for tPA his stroke scale is currently 0 will continue to reassess, based on history and physical, concern for near syncopal event possibly secondary to arrhythmia, TIA, dissection      1257 Patient feels improved without return of symptoms informed of incidental findings, will last admission, unclear ideology for his symptoms possible TIA possible arrhythmia etc      1305 D/w neuro, rec MRi to r/o small embolic cva now resolved, also UDS unclear ideology patient and family agreeable to observation                                  MDM    Disposition  Final diagnoses:   Stroke-like symptoms     Time reflects when diagnosis was documented in both MDM as applicable and the Disposition within this note     Time User Action Codes Description Comment    2/17/2019  1:17 PM Karol Console Add [R29 90] Stroke-like symptoms     2/18/2019 11:30 AM Nelson HENRY Add [R00 1] Bradycardia       ED Disposition     ED Disposition Condition Date/Time Comment    Admit Stable Sun Feb 17, 2019  1:18 PM Case was discussed with Nicky and the patient's admission status was agreed to be Admission Status: observation status to the service of Dr Debi Lazaro           Follow-up Information     Follow up With Specialties Details Why Contact Info    Philip Kam MD Cardiology, Multidisciplinary Follow up CAll to schedule a hospital follow u for event monitor [address]      [facility], [address] [place] [address], Nurse Practitioner Follow up As needed 21   [address] [facility] 16  [phone]            Discharge Medication List as of 2/18/2019  3:42 PM      CONTINUE these medications which have NOT CHANGED    Details   lisinopril-hydrochlorothiazide (PRINZIDE,ZESTORETIC) 20-25 MG per tablet TAKE 1 TABLET BY MOUTH EVERY DAY, Normal           Outpatient Discharge Orders   Discharge Diet     Activity as tolerated       ED Provider  Electronically Signed by           Marybeth Rabago DO  02/18/19 2811

## 2019-02-17 NOTE — PLAN OF CARE
Problem: PAIN - ADULT  Goal: Verbalizes/displays adequate comfort level or baseline comfort level  Description  Interventions:  - Encourage patient to monitor pain and request assistance  - Assess pain using appropriate pain scale  - Administer analgesics based on type and severity of pain and evaluate response  - Implement non-pharmacological measures as appropriate and evaluate response  - Consider cultural and social influences on pain and pain management  - Notify physician/advanced practitioner if interventions unsuccessful or patient reports new pain  Outcome: Progressing     Problem: INFECTION - ADULT  Goal: Absence or prevention of progression during hospitalization  Description  INTERVENTIONS:  - Assess and monitor for signs and symptoms of infection  - Monitor lab/diagnostic results  - Monitor all insertion sites, i e  indwelling lines, tubes, and drains  - Monitor endotracheal (as able) and nasal secretions for changes in amount and color  - [place] appropriate cooling/warming therapies per order  - Administer medications as ordered  - Instruct and encourage patient and family to use good hand hygiene technique  - Identify and instruct in appropriate isolation precautions for identified infection/condition  Outcome: Progressing  Goal: Absence of fever/infection during neutropenic period  Description  INTERVENTIONS:  - Monitor WBC  - Implement neutropenic guidelines  Outcome: Progressing     Problem: SAFETY ADULT  Goal: Patient will remain free of falls  Description  INTERVENTIONS:  - Assess patient frequently for physical needs  -  Identify cognitive and physical deficits and behaviors that affect risk of falls    -  [place] fall precautions as indicated by assessment   - Educate patient/family on patient safety including physical limitations  - Instruct patient to call for assistance with activity based on assessment  - Modify environment to reduce risk of injury  - Consider OT/PT consult to assist with strengthening/mobility  Outcome: Progressing  Goal: Maintain or return to baseline ADL function  Description  INTERVENTIONS:  -  Assess patient's ability to carry out ADLs; assess patient's baseline for ADL function and identify physical deficits which impact ability to perform ADLs (bathing, care of mouth/teeth, toileting, grooming, dressing, etc )  - Assess/evaluate cause of self-care deficits   - Assess range of motion  - Assess patient's mobility; develop plan if impaired  - Assess patient's need for assistive devices and provide as appropriate  - Encourage maximum independence but intervene and supervise when necessary  ¯ Involve family in performance of ADLs  ¯ Assess for home care needs following discharge   ¯ Request OT consult to assist with ADL evaluation and planning for discharge  ¯ Provide patient education as appropriate  Outcome: Progressing  Goal: Maintain or return mobility status to optimal level  Description  INTERVENTIONS:  - Assess patient's baseline mobility status (ambulation, transfers, stairs, etc )    - Identify cognitive and physical deficits and behaviors that affect mobility  - Identify mobility aids required to assist with transfers and/or ambulation (gait belt, sit-to-stand, lift, walker, cane, etc )  - [place] fall precautions as indicated by assessment  - Record patient progress and toleration of activity level on Mobility SBAR; progress patient to next Phase/Stage  - Instruct patient to call for assistance with activity based on assessment  - Request Rehabilitation consult to assist with strengthening/weightbearing, etc   Outcome: Progressing     Problem: DISCHARGE PLANNING  Goal: Discharge to home or other facility with appropriate resources  Description  INTERVENTIONS:  - Identify barriers to discharge w/patient and caregiver  - Arrange for needed discharge resources and transportation as appropriate  - Identify discharge learning needs (meds, wound care, etc )  - Arrange for interpretive services to assist at discharge as needed  - Refer to Case Management Department for coordinating discharge planning if the patient needs post-hospital services based on physician/advanced practitioner order or complex needs related to functional status, cognitive ability, or social support system  Outcome: Progressing     Problem: Knowledge Deficit  Goal: Patient/family/caregiver demonstrates understanding of disease process, treatment plan, medications, and discharge instructions  Description  Complete learning assessment and assess knowledge base  Interventions:  - Provide teaching at level of understanding  - Provide teaching via preferred learning methods  Outcome: Progressing     Problem: CARDIOVASCULAR - ADULT  Goal: Maintains optimal cardiac output and hemodynamic stability  Description  INTERVENTIONS:  - Monitor I/O, vital signs and rhythm  - Monitor for S/S and trends of decreased cardiac output i e  bleeding, hypotension  - Administer and titrate ordered vasoactive medications to optimize hemodynamic stability  - Assess quality of pulses, skin color and temperature  - Assess for signs of decreased coronary artery perfusion - ex   Angina  - Instruct patient to report change in severity of symptoms  Outcome: Progressing  Goal: Absence of cardiac dysrhythmias or at baseline rhythm  Description  INTERVENTIONS:  - Continuous cardiac monitoring, monitor vital signs, obtain 12 lead EKG if indicated  - Administer antiarrhythmic and heart rate control medications as ordered  - Monitor electrolytes and administer replacement therapy as ordered  Outcome: Progressing

## 2019-02-18 ENCOUNTER — APPOINTMENT (OUTPATIENT)
Dept: NON INVASIVE DIAGNOSTICS | Facility: HOSPITAL | Age: 54
End: 2019-02-18
Payer: COMMERCIAL

## 2019-02-18 ENCOUNTER — TRANSITIONAL CARE MANAGEMENT (OUTPATIENT)
Dept: FAMILY MEDICINE CLINIC | Facility: CLINIC | Age: 54
End: 2019-02-18

## 2019-02-18 VITALS
BODY MASS INDEX: 32.41 KG/M2 | TEMPERATURE: 97.88 F | DIASTOLIC BLOOD PRESSURE: 78 MMHG | HEART RATE: 80 BPM | HEIGHT: 71 IN | RESPIRATION RATE: 18 BRPM | WEIGHT: 231.48 LBS | SYSTOLIC BLOOD PRESSURE: 133 MMHG | OXYGEN SATURATION: 98 %

## 2019-02-18 LAB
ANION GAP SERPL CALCULATED.3IONS-SCNC: 9 MMOL/L (ref 4–13)
ATRIAL RATE: 65 BPM
ATRIAL RATE: 90 BPM
BUN SERPL-MCNC: 14 MG/DL (ref 5–25)
CALCIUM SERPL-MCNC: 9.7 MG/DL (ref 8.3–10.1)
CHLORIDE SERPL-SCNC: 101 MMOL/L (ref 100–108)
CHOLEST SERPL-MCNC: 148 MG/DL (ref 50–200)
CO2 SERPL-SCNC: 27 MMOL/L (ref 21–32)
CREAT SERPL-MCNC: 0.9 MG/DL (ref 0.6–1.3)
EST. AVERAGE GLUCOSE BLD GHB EST-MCNC: 128 MG/DL
GFR SERPL CREATININE-BSD FRML MDRD: 98 ML/MIN/1.73SQ M
GLUCOSE P FAST SERPL-MCNC: 112 MG/DL (ref 65–99)
GLUCOSE SERPL-MCNC: 112 MG/DL (ref 65–140)
HBA1C MFR BLD: 6.1 % (ref 4.2–6.3)
HDLC SERPL-MCNC: 46 MG/DL (ref 40–60)
LDLC SERPL CALC-MCNC: 87 MG/DL (ref 0–100)
P AXIS: 31 DEGREES
P AXIS: 50 DEGREES
POTASSIUM SERPL-SCNC: 3.9 MMOL/L (ref 3.5–5.3)
PR INTERVAL: 152 MS
PR INTERVAL: 162 MS
QRS AXIS: 48 DEGREES
QRS AXIS: 65 DEGREES
QRSD INTERVAL: 100 MS
QRSD INTERVAL: 94 MS
QT INTERVAL: 368 MS
QT INTERVAL: 396 MS
QTC INTERVAL: 411 MS
QTC INTERVAL: 450 MS
SODIUM SERPL-SCNC: 137 MMOL/L (ref 136–145)
T WAVE AXIS: 39 DEGREES
T WAVE AXIS: 88 DEGREES
TRIGL SERPL-MCNC: 77 MG/DL
VENTRICULAR RATE: 65 BPM
VENTRICULAR RATE: 90 BPM

## 2019-02-18 PROCEDURE — 93306 TTE W/DOPPLER COMPLETE: CPT

## 2019-02-18 PROCEDURE — 80061 LIPID PANEL: CPT | Performed by: INTERNAL MEDICINE

## 2019-02-18 PROCEDURE — 93306 TTE W/DOPPLER COMPLETE: CPT | Performed by: INTERNAL MEDICINE

## 2019-02-18 PROCEDURE — 83036 HEMOGLOBIN GLYCOSYLATED A1C: CPT | Performed by: INTERNAL MEDICINE

## 2019-02-18 PROCEDURE — 99238 HOSP IP/OBS DSCHRG MGMT 30/<: CPT | Performed by: INTERNAL MEDICINE

## 2019-02-18 PROCEDURE — 80048 BASIC METABOLIC PNL TOTAL CA: CPT | Performed by: INTERNAL MEDICINE

## 2019-02-18 PROCEDURE — 93010 ELECTROCARDIOGRAM REPORT: CPT | Performed by: INTERNAL MEDICINE

## 2019-02-18 RX ADMIN — ASPIRIN 81 MG 81 MG: 81 TABLET ORAL at 08:52

## 2019-02-18 RX ADMIN — LISINOPRIL: 20 TABLET ORAL at 08:52

## 2019-02-18 NOTE — PLAN OF CARE
Problem: PAIN - ADULT  Goal: Verbalizes/displays adequate comfort level or baseline comfort level  Description  Interventions:  - Encourage patient to monitor pain and request assistance  - Assess pain using appropriate pain scale  - Administer analgesics based on type and severity of pain and evaluate response  - Implement non-pharmacological measures as appropriate and evaluate response  - Consider cultural and social influences on pain and pain management  - Notify physician/advanced practitioner if interventions unsuccessful or patient reports new pain  Outcome: Progressing     Problem: INFECTION - ADULT  Goal: Absence or prevention of progression during hospitalization  Description  INTERVENTIONS:  - Assess and monitor for signs and symptoms of infection  - Monitor lab/diagnostic results  - Monitor all insertion sites, i e  indwelling lines, tubes, and drains  - Monitor endotracheal (as able) and nasal secretions for changes in amount and color  - [place] appropriate cooling/warming therapies per order  - Administer medications as ordered  - Instruct and encourage patient and family to use good hand hygiene technique  - Identify and instruct in appropriate isolation precautions for identified infection/condition  Outcome: Progressing  Goal: Absence of fever/infection during neutropenic period  Description  INTERVENTIONS:  - Monitor WBC  - Implement neutropenic guidelines  Outcome: Progressing     Problem: SAFETY ADULT  Goal: Patient will remain free of falls  Description  INTERVENTIONS:  - Assess patient frequently for physical needs  -  Identify cognitive and physical deficits and behaviors that affect risk of falls    -  [place] fall precautions as indicated by assessment   - Educate patient/family on patient safety including physical limitations  - Instruct patient to call for assistance with activity based on assessment  - Modify environment to reduce risk of injury  - Consider OT/PT consult to assist with strengthening/mobility  Outcome: Progressing  Goal: Maintain or return to baseline ADL function  Description  INTERVENTIONS:  -  Assess patient's ability to carry out ADLs; assess patient's baseline for ADL function and identify physical deficits which impact ability to perform ADLs (bathing, care of mouth/teeth, toileting, grooming, dressing, etc )  - Assess/evaluate cause of self-care deficits   - Assess range of motion  - Assess patient's mobility; develop plan if impaired  - Assess patient's need for assistive devices and provide as appropriate  - Encourage maximum independence but intervene and supervise when necessary  ¯ Involve family in performance of ADLs  ¯ Assess for home care needs following discharge   ¯ Request OT consult to assist with ADL evaluation and planning for discharge  ¯ Provide patient education as appropriate  Outcome: Progressing  Goal: Maintain or return mobility status to optimal level  Description  INTERVENTIONS:  - Assess patient's baseline mobility status (ambulation, transfers, stairs, etc )    - Identify cognitive and physical deficits and behaviors that affect mobility  - Identify mobility aids required to assist with transfers and/or ambulation (gait belt, sit-to-stand, lift, walker, cane, etc )  - [place] fall precautions as indicated by assessment  - Record patient progress and toleration of activity level on Mobility SBAR; progress patient to next Phase/Stage  - Instruct patient to call for assistance with activity based on assessment  - Request Rehabilitation consult to assist with strengthening/weightbearing, etc   Outcome: Progressing     Problem: DISCHARGE PLANNING  Goal: Discharge to home or other facility with appropriate resources  Description  INTERVENTIONS:  - Identify barriers to discharge w/patient and caregiver  - Arrange for needed discharge resources and transportation as appropriate  - Identify discharge learning needs (meds, wound care, etc )  - Arrange for interpretive services to assist at discharge as needed  - Refer to Case Management Department for coordinating discharge planning if the patient needs post-hospital services based on physician/advanced practitioner order or complex needs related to functional status, cognitive ability, or social support system  Outcome: Progressing     Problem: Knowledge Deficit  Goal: Patient/family/caregiver demonstrates understanding of disease process, treatment plan, medications, and discharge instructions  Description  Complete learning assessment and assess knowledge base  Interventions:  - Provide teaching at level of understanding  - Provide teaching via preferred learning methods  Outcome: Progressing     Problem: CARDIOVASCULAR - ADULT  Goal: Maintains optimal cardiac output and hemodynamic stability  Description  INTERVENTIONS:  - Monitor I/O, vital signs and rhythm  - Monitor for S/S and trends of decreased cardiac output i e  bleeding, hypotension  - Administer and titrate ordered vasoactive medications to optimize hemodynamic stability  - Assess quality of pulses, skin color and temperature  - Assess for signs of decreased coronary artery perfusion - ex  Angina  - Instruct patient to report change in severity of symptoms  Outcome: Progressing  Goal: Absence of cardiac dysrhythmias or at baseline rhythm  Description  INTERVENTIONS:  - Continuous cardiac monitoring, monitor vital signs, obtain 12 lead EKG if indicated  - Administer antiarrhythmic and heart rate control medications as ordered  - Monitor electrolytes and administer replacement therapy as ordered  Outcome: Progressing     Problem: Neurological Deficit  Goal: Neurological status is stable or improving  Description  Interventions:  - Monitor and assess patient's level of consciousness, motor function, sensory function, and level of assistance needed for ADLs  - Monitor and report changes from baseline   Collaborate with interdisciplinary team to initiate plan and implement interventions as ordered  - Provide and maintain a safe environment  - Utilize seizure precautions  - Utilize fall precautions  - Utilize aspiration precautions  - Utilize bleeding precautions  Outcome: Progressing     Problem: Activity Intolerance/Impaired Mobility  Goal: Mobility/activity is maintained at optimum level for patient  Description  Interventions:  - Assess and monitor patient  barriers to mobility and need for assistive/adaptive devices  - Assess patient's emotional response to limitations  - Collaborate with interdisciplinary team and initiate plans and interventions as ordered  - Encourage independent activity per ability   - Maintain proper body alignment  - Perform active/passive rom as tolerated/ordered  - Plan activities to conserve energy   - Turn patient  Outcome: Progressing     Problem: Communication Impairment  Goal: Ability to express needs and understand communication  Description  Assess patient's communication skills and ability to understand information  Patient will demonstrate use of effective communication techniques, alternative methods of communication and understanding even if not able to speak  - Encourage communication and provide alternate methods of communication as needed  - Collaborate with case management/ for discharge needs  - Include patient/family/caregiver in decisions related to communication  Outcome: Progressing     Problem: Potential for Aspiration  Goal: Non-ventilated patient's risk of aspiration is minimized  Description  Assess and monitor vital signs, respiratory status, and labs (WBC)  Monitor for signs of aspiration (tachypnea, cough, rales, wheezing, cyanosis, fever)  - Assess and monitor patient's ability to swallow  - Place patient up in chair to eat if possible  - HOB up at 90 degrees to eat if unable to get patient up into chair   - Supervise patient during oral intake     - Instruct patient to take small bites  - Instruct patient to take small single sips when taking liquids  - Follow patient-specific strategies generated by speech pathologist   Outcome: Progressing     Problem: Nutrition  Goal: Nutrition/Hydration status is improving  Description  Monitor and assess patient's nutrition/hydration status for malnutrition (ex- brittle hair, bruises, dry skin, pale skin and conjunctiva, muscle wasting, smooth red tongue, and disorientation)  Collaborate with interdisciplinary team and initiate plan and interventions as ordered  Monitor patient's weight and dietary intake as ordered or per policy  Utilize nutrition screening tool and intervene per policy  Determine patient's food preferences and provide high-protein, high-caloric foods as appropriate  - Assist patient with eating   - Allow adequate time for meals   - Encourage patient to take dietary supplement as ordered  - Collaborate with clinical nutritionist   - Include patient/family/caregiver in decisions related to nutrition    Outcome: Progressing

## 2019-02-18 NOTE — DISCHARGE SUMMARY
Discharge Summary - [facility]    Patient Information: Roseann Arroyo 47 y o  male MRN: [id number]  Unit/Bed#: -01 Encounter: [id number]    Discharging Physician / Practitioner: Anita Cox MD  PCP: [facility]  Admission Date: 2/17/2019  Discharge Date: 02/18/19    Reason for Admission:  Bilateral arm tingling  Discharge Diagnoses:     Principal Problem:    Bilateral numbness and tingling of arms and legs  Active Problems:    Essential hypertension    Obesity (BMI 30-39  9)    Dizziness  Resolved Problems:    * No resolved hospital problems  *      Consultations During Hospital Stay:  · Neurology    Procedures Performed:     · CT head and neck:  No acute intracranial CT abnormality  Unremarkable CT angio of the head and neck no critical stenosis  Approximately 2 cm hyper attenuating cystic appearing lesion in the interest sinus treated root of the right maxillary molar with calcification  Consider ENT consultation for discrimination between cyst verses keratocyst   · CT chest:  No pulmonary emboli, Colonic diverticulosis  · MRI brain:  No acute findings  Dental cyst noted no recommendations made for follow-up  · 2D echo shows ejection fraction 60% no regional wall motion abnormalities  Mildly dilated atrium  Telemetry without arrhythmia however noted to be bradycardic  Significant Findings / Test Results:     · As above  · Creatinine 0 90    Incidental Findings:   · Cystic lesion 1st right maxilary molar- ENT consult  MRI shows cystic lesion  Test Results Pending at Discharge (will require follow up): · None     Outpatient Tests Requested:  · None    Complications:  None    Hospital Course:     Roseann Arroyo is a 47 y o  male patient who originally presented to the hospital on 2/17/2019 due feeling of impending doom bilateral arm tingling  Patient got up in the a m   With feeling okay but suddenly then developed bilateral arm tingling and a sensation that he could not describe  He did not feel well  He laid down on the couch with his wife and subsequently continued to have sensation of impending doom  He did 1 where his wife so he asked her to get ready to go to the hospital   They drove to the hospital on the way he continued to have sensations of wanting to pass out and feeling on as of his arms were numb and tingly  Patient was seen in the emergency room and admitted for rule out TIA versus stroke  CT head was negative  Symptoms resolved without intervention  Neurology saw the patient and felt that this represented anxiety is  The patient however was noted to be bradycardic overnight on the telemetry monitor and therefore bradyarrhythmia versus tachyarrhythmia could not be completely ruled out  2D echo did show dilated atrium which would be a set up for possible tachyarrhythmia  The patient did feel that his heart was racing at the time of the events  There is no documented atrial fibrillation flutter or tachyarrhythmia during the hospital stay  Patient does take antihypertensives but had not taken that morning until he was on the way to the hospital   Hypertensive urgency therefore could have played a role but was not noted on admission  Patient was discharged to follow up with Cardiology for possible event monitor  He was to continue with antihypertensive medications and follow up wit his PCP  Patient did admit to smoking marijuana the morning of the event  He was cautioned against further use of marijuana which could have caused any number of complications including tachyarrhythmia or hypertensive urgency    Condition at Discharge: good     Discharge Day Visit / Exam:     Subjective:  Patient feeling better no further events over the course of the night  Vitals: Blood Pressure: 133/78 (02/18/19 1505)  Pulse: 80 (02/18/19 1505)  Temperature: 97 88 °F (36 6 °C) (02/18/19 1505)  Temp Source: Oral (02/18/19 0307)  Respirations: 18 (02/18/19 1505)  Height: 5' 11" (180 3 cm) (02/17/19 0937)  Weight - Scale: 105 kg (231 lb 7 7 oz) (02/17/19 0937)  SpO2: 98 % (02/18/19 1505)  Exam:   Physical Exam    General well-developed moderately overweight male no acute distress normocephalic atraumatic pupils equal round and reactive to light extraocular muscles intact mucous membranes are moist neck was supple there is no JVD no lymph nodes no carotid bruits chest is decreased but clear to auscultation is no rhonchi rales or wheezes  Cardiovascular regular rate rhythm positive S1 and S2 no S3-S4 murmur or gallop  Abdomen is obese soft nontender nondistended with positive bowel sounds no hepatosplenomegaly no guarding or rebound  Neurologically patient awake alert oriented cranial nerves 2-12 intact, gait intact no nystagmus    Discharge instructions/Information to patient and family:   See after visit summary for information provided to patient and family  Provisions for Follow-Up Care:  See after visit summary for information related to follow-up care and any pertinent home health orders  Disposition:     Home    For Discharges to   [facility]:   · Not Applicable to this Patient - Not Applicable to this Patient    Planned Readmission:  None     Discharge Statement:  I spent 30 minutes discharging the patient  This time was spent on the day of discharge  I had direct contact with the patient on the day of discharge  Greater than 50% of the total time was spent examining patient, answering all patient questions, arranging and discussing plan of care with patient as well as directly providing post-discharge instructions  Additional time then spent on discharge activities  Discharge Medications:  See after visit summary for reconciled discharge medications provided to patient and family        ** Please Note: This note has been constructed using a voice recognition system **

## 2019-02-18 NOTE — PLAN OF CARE
Problem: PAIN - ADULT  Goal: Verbalizes/displays adequate comfort level or baseline comfort level  Description  Interventions:  - Encourage patient to monitor pain and request assistance  - Assess pain using appropriate pain scale  - Administer analgesics based on type and severity of pain and evaluate response  - Implement non-pharmacological measures as appropriate and evaluate response  - Consider cultural and social influences on pain and pain management  - Notify physician/advanced practitioner if interventions unsuccessful or patient reports new pain  Outcome: Progressing     Problem: INFECTION - ADULT  Goal: Absence or prevention of progression during hospitalization  Description  INTERVENTIONS:  - Assess and monitor for signs and symptoms of infection  - Monitor lab/diagnostic results  - Monitor all insertion sites, i e  indwelling lines, tubes, and drains  - Monitor endotracheal (as able) and nasal secretions for changes in amount and color  - [place] appropriate cooling/warming therapies per order  - Administer medications as ordered  - Instruct and encourage patient and family to use good hand hygiene technique  - Identify and instruct in appropriate isolation precautions for identified infection/condition  Outcome: Progressing  Goal: Absence of fever/infection during neutropenic period  Description  INTERVENTIONS:  - Monitor WBC  - Implement neutropenic guidelines  Outcome: Progressing     Problem: SAFETY ADULT  Goal: Patient will remain free of falls  Description  INTERVENTIONS:  - Assess patient frequently for physical needs  -  Identify cognitive and physical deficits and behaviors that affect risk of falls    -  [place] fall precautions as indicated by assessment   - Educate patient/family on patient safety including physical limitations  - Instruct patient to call for assistance with activity based on assessment  - Modify environment to reduce risk of injury  - Consider OT/PT consult to assist with strengthening/mobility  Outcome: Progressing  Goal: Maintain or return to baseline ADL function  Description  INTERVENTIONS:  -  Assess patient's ability to carry out ADLs; assess patient's baseline for ADL function and identify physical deficits which impact ability to perform ADLs (bathing, care of mouth/teeth, toileting, grooming, dressing, etc )  - Assess/evaluate cause of self-care deficits   - Assess range of motion  - Assess patient's mobility; develop plan if impaired  - Assess patient's need for assistive devices and provide as appropriate  - Encourage maximum independence but intervene and supervise when necessary  ¯ Involve family in performance of ADLs  ¯ Assess for home care needs following discharge   ¯ Request OT consult to assist with ADL evaluation and planning for discharge  ¯ Provide patient education as appropriate  Outcome: Progressing  Goal: Maintain or return mobility status to optimal level  Description  INTERVENTIONS:  - Assess patient's baseline mobility status (ambulation, transfers, stairs, etc )    - Identify cognitive and physical deficits and behaviors that affect mobility  - Identify mobility aids required to assist with transfers and/or ambulation (gait belt, sit-to-stand, lift, walker, cane, etc )  - [place] fall precautions as indicated by assessment  - Record patient progress and toleration of activity level on Mobility SBAR; progress patient to next Phase/Stage  - Instruct patient to call for assistance with activity based on assessment  - Request Rehabilitation consult to assist with strengthening/weightbearing, etc   Outcome: Progressing     Problem: DISCHARGE PLANNING  Goal: Discharge to home or other facility with appropriate resources  Description  INTERVENTIONS:  - Identify barriers to discharge w/patient and caregiver  - Arrange for needed discharge resources and transportation as appropriate  - Identify discharge learning needs (meds, wound care, etc )  - Arrange for interpretive services to assist at discharge as needed  - Refer to Case Management Department for coordinating discharge planning if the patient needs post-hospital services based on physician/advanced practitioner order or complex needs related to functional status, cognitive ability, or social support system  Outcome: Progressing     Problem: Knowledge Deficit  Goal: Patient/family/caregiver demonstrates understanding of disease process, treatment plan, medications, and discharge instructions  Description  Complete learning assessment and assess knowledge base  Interventions:  - Provide teaching at level of understanding  - Provide teaching via preferred learning methods  Outcome: Progressing     Problem: CARDIOVASCULAR - ADULT  Goal: Maintains optimal cardiac output and hemodynamic stability  Description  INTERVENTIONS:  - Monitor I/O, vital signs and rhythm  - Monitor for S/S and trends of decreased cardiac output i e  bleeding, hypotension  - Administer and titrate ordered vasoactive medications to optimize hemodynamic stability  - Assess quality of pulses, skin color and temperature  - Assess for signs of decreased coronary artery perfusion - ex  Angina  - Instruct patient to report change in severity of symptoms  Outcome: Progressing  Goal: Absence of cardiac dysrhythmias or at baseline rhythm  Description  INTERVENTIONS:  - Continuous cardiac monitoring, monitor vital signs, obtain 12 lead EKG if indicated  - Administer antiarrhythmic and heart rate control medications as ordered  - Monitor electrolytes and administer replacement therapy as ordered  Outcome: Progressing     Problem: Neurological Deficit  Goal: Neurological status is stable or improving  Description  Interventions:  - Monitor and assess patient's level of consciousness, motor function, sensory function, and level of assistance needed for ADLs  - Monitor and report changes from baseline   Collaborate with interdisciplinary team to initiate plan and implement interventions as ordered  - Provide and maintain a safe environment  - Utilize seizure precautions  - Utilize fall precautions  - Utilize aspiration precautions  - Utilize bleeding precautions  Outcome: Progressing     Problem: Activity Intolerance/Impaired Mobility  Goal: Mobility/activity is maintained at optimum level for patient  Description  Interventions:  - Assess and monitor patient  barriers to mobility and need for assistive/adaptive devices  - Assess patient's emotional response to limitations  - Collaborate with interdisciplinary team and initiate plans and interventions as ordered  - Encourage independent activity per ability   - Maintain proper body alignment  - Perform active/passive rom as tolerated/ordered  - Plan activities to conserve energy   - Turn patient  Outcome: Progressing     Problem: Communication Impairment  Goal: Ability to express needs and understand communication  Description  Assess patient's communication skills and ability to understand information  Patient will demonstrate use of effective communication techniques, alternative methods of communication and understanding even if not able to speak  - Encourage communication and provide alternate methods of communication as needed  - Collaborate with case management/ for discharge needs  - Include patient/family/caregiver in decisions related to communication  Outcome: Progressing     Problem: Potential for Aspiration  Goal: Non-ventilated patient's risk of aspiration is minimized  Description  Assess and monitor vital signs, respiratory status, and labs (WBC)  Monitor for signs of aspiration (tachypnea, cough, rales, wheezing, cyanosis, fever)  - Assess and monitor patient's ability to swallow  - Place patient up in chair to eat if possible  - HOB up at 90 degrees to eat if unable to get patient up into chair   - Supervise patient during oral intake     - Instruct patient to take small bites  - Instruct patient to take small single sips when taking liquids  - Follow patient-specific strategies generated by speech pathologist   Outcome: Progressing     Problem: Nutrition  Goal: Nutrition/Hydration status is improving  Description  Monitor and assess patient's nutrition/hydration status for malnutrition (ex- brittle hair, bruises, dry skin, pale skin and conjunctiva, muscle wasting, smooth red tongue, and disorientation)  Collaborate with interdisciplinary team and initiate plan and interventions as ordered  Monitor patient's weight and dietary intake as ordered or per policy  Utilize nutrition screening tool and intervene per policy  Determine patient's food preferences and provide high-protein, high-caloric foods as appropriate  - Assist patient with eating   - Allow adequate time for meals   - Encourage patient to take dietary supplement as ordered  - Collaborate with clinical nutritionist   - Include patient/family/caregiver in decisions related to nutrition    Outcome: Progressing

## 2019-02-18 NOTE — UTILIZATION REVIEW
Initial Clinical Review    Admission: Date/Time/Statement: OBSERVATION 2/17/19 @1319  Orders Placed This Encounter   Procedures    Place in Observation     Standing Status:   Standing     Number of Occurrences:   1     Order Specific Question:   Admitting Physician     Answer:   Genna Berry [[id number]]     Order Specific Question:   Level of Care     Answer:   Med Surg [16]     Order Specific Question:   Bed request comments     Answer:   Tele     ED: Date/Time/Mode of Arrival:   ED Arrival Information     Expected Arrival Acuity Means of Arrival Escorted By Service Admission Type    - 2/17/2019 09:32 Urgent Walk-In Spouse General Medicine Urgent    Arrival Complaint    CHEST PAIN        Chief Complaint:   Chief Complaint   Patient presents with    Dizziness     Pt states he was watching TV when he felt a numb sensation in left hand with dizziness and disorientation  Assessment/Plan: 48 yo m to ED from home admitted under observation to r/o TIA, hypokalemic    Was on couch 30 min pta, started w/L hand numbness that involved the arm, no weakness or paresthesia  Later had diffuse weakness BUE and BLE  Got up from the couch, pacing, unable to speak fluently, felt like something wasn't right  Thought he was going to die  Felt off balance  Sx persisted until CT done, then gradually improved and felt back to normal  Imaging negative for stroke, echo pending, neuro checks in progress  Replete K, check mg  Neuro consulted       Per neuro: anxiety attack, exam entirely nonfocal     ED Vital Signs:   ED Triage Vitals   Temperature Pulse Respirations Blood Pressure SpO2   02/17/19 0938 02/17/19 0937 02/17/19 0937 02/17/19 0937 02/17/19 0937   (!) 97 4 °F (36 3 °C) 92 18 163/82 99 %      Temp Source Heart Rate Source Patient Position - Orthostatic VS BP Location FiO2 (%)   02/17/19 1530 02/17/19 0937 02/17/19 0937 02/17/19 0937 --   Oral Monitor Lying Right arm       Pain Score       02/17/19 0937       No Pain        Wt Readings from Last 1 Encounters:   02/17/19 105 kg (231 lb 7 7 oz)     Pertinent Labs/Diagnostic Test Results:   k 2 9   A gap 15   Gluc 168   Alk phos 32   Alb 5 5   Trop wnl  UDS: +THC  MRI brain: nothing acute  CTAhead/neck: 1   No acute intracranial CT abnormality  2   Unremarkable CT angiogram of the head and neck   No critical stenosis   No aneurysm or AV malformation  3    Approximately 2 cm hyperattenuating cystic appearing lesion arising from intra-sinus protruded root of 1st right maxillary molar tooth with distinct peripheral calcification   Differential considerations include dentigerous cyst versus odontogenic keratocyst    CTA chest: normal, colonic diverticulosis      ED Treatment: workup    Past Medical/Surgical History:    Active Ambulatory Problems     Diagnosis Date Noted    Essential hypertension 06/04/2018    Obesity (BMI 30-39 9) 06/04/2018    Screening for prostate cancer 06/04/2018    Encounter for screening for lung cancer 08/03/2018    Hand, foot and mouth disease 10/15/2018       Past Medical History:   Diagnosis Date    Hypertension     Inner ear dysfunction     Irreducible umbilical hernia     Pneumonia      Admitting Diagnosis: Dizziness [R42]  Stroke-like symptoms [R29 90]  Age/Sex: 47 y o  male    Admission Orders:  Scheduled Meds:   Current Facility-Administered Medications:  aspirin 81 mg Oral Daily   atorvastatin 40 mg Oral QPM   lisinopril-hydrochlorothiazide (PRINZIDE 20/25) combo dose  Oral Daily     NIH stroke scale  Tele  Neuro checks Every 1 hour x 4 hours, then every 2 hours x 4, then every 4 hours x 72 hours  Stroke education  Dysphagia eval prior to PO intake  Incentive spirom hourly wa  Activity as judson  a1c in am  hse 2gm na diet  Cons neuro  Cons case mgmt  Echo

## 2019-02-19 ENCOUNTER — TELEPHONE (OUTPATIENT)
Dept: FAMILY MEDICINE CLINIC | Facility: CLINIC | Age: 54
End: 2019-02-19

## 2019-02-19 ENCOUNTER — OFFICE VISIT (OUTPATIENT)
Dept: FAMILY MEDICINE CLINIC | Facility: CLINIC | Age: 54
End: 2019-02-19
Payer: COMMERCIAL

## 2019-02-19 VITALS
BODY MASS INDEX: 32.4 KG/M2 | TEMPERATURE: 97.8 F | DIASTOLIC BLOOD PRESSURE: 88 MMHG | HEART RATE: 102 BPM | OXYGEN SATURATION: 98 % | HEIGHT: 71 IN | SYSTOLIC BLOOD PRESSURE: 150 MMHG | WEIGHT: 231.4 LBS

## 2019-02-19 DIAGNOSIS — R20.0 BILATERAL NUMBNESS AND TINGLING OF ARMS AND LEGS: ICD-10-CM

## 2019-02-19 DIAGNOSIS — I10 ESSENTIAL HYPERTENSION: Primary | ICD-10-CM

## 2019-02-19 DIAGNOSIS — R20.2 BILATERAL NUMBNESS AND TINGLING OF ARMS AND LEGS: ICD-10-CM

## 2019-02-19 PROCEDURE — 1111F DSCHRG MED/CURRENT MED MERGE: CPT | Performed by: FAMILY MEDICINE

## 2019-02-19 PROCEDURE — 99495 TRANSJ CARE MGMT MOD F2F 14D: CPT | Performed by: FAMILY MEDICINE

## 2019-02-19 NOTE — PROGRESS NOTES
Assessment/Plan:    No problem-specific Assessment & Plan notes found for this encounter  Diagnoses and all orders for this visit:    Essential hypertension  I had a long discussion with patient in regards to hypertension pathophysiology and treatment  He would like to be off his medication at this time and try following a weight loss and diet program at this time given side effects medications per patient he agreed to measure his blood pressure daily and if still elevated above 140/90 mmhg he agreed to restart medication    Bilateral numbness and tingling of arms and legs  Improved at this time  Follow up in 2-3 weeks        Subjective:      Patient ID: Donta Mann is a 47 y o  male  Patient is here to follow-up for a hospital stay at [facility] due to numbness in his lower extremities along with weakness of his lower extremities as well as both arms  He had extensive testing done in the emergency department including MRI of the head as well as an echocardiogram and CT of the head and neck which did not show the cause for his symptoms  His testing was negative for stroke  He does have a history of hypertension his blood pressure is 150/80 mmhg at this time  He said that his researched the medication hydrochlorothiazide and it showed that some of the symptoms that he has could be attributed to hydrochlorothiazide  Would like to stop this medication at this time  The following portions of the patient's history were reviewed and updated as appropriate:   He  has a past medical history of Hypertension, Inner ear dysfunction, Irreducible umbilical hernia, and Pneumonia  He   Patient Active Problem List    Diagnosis Date Noted    Bilateral numbness and tingling of arms and legs 02/17/2019    Dizziness 02/17/2019    Hand, foot and mouth disease 10/15/2018    Encounter for screening for lung cancer 08/03/2018    Essential hypertension 06/04/2018    Obesity (BMI 30-39  9) 06/04/2018    Screening for prostate cancer 06/04/2018     He  has a past surgical history that includes Shoulder surgery; Hernia repair; and Colonoscopy (N/A, 9/16/2016)  His family history is not on file  He  reports that he has quit smoking  He has never used smokeless tobacco  He reports that he drank alcohol  He reports that he has current or past drug history  Drug: Marijuana  Current Outpatient Medications   Medication Sig Dispense Refill    lisinopril-hydrochlorothiazide (PRINZIDE,ZESTORETIC) 20-25 MG per tablet TAKE 1 TABLET BY MOUTH EVERY DAY 30 tablet 0     No current facility-administered medications for this visit  Current Outpatient Medications on File Prior to Visit   Medication Sig    lisinopril-hydrochlorothiazide (PRINZIDE,ZESTORETIC) 20-25 MG per tablet TAKE 1 TABLET BY MOUTH EVERY DAY     Current Facility-Administered Medications on File Prior to Visit   Medication    [DISCONTINUED] aspirin chewable tablet 81 mg    [DISCONTINUED] lisinopril-hydrochlorothiazide (PRINZIDE 20/25) combo dose     He has No Known Allergies       Review of Systems   Constitutional: Negative for activity change, appetite change, fatigue and fever  HENT: Negative for congestion and ear discharge  Respiratory: Negative for cough and shortness of breath  Cardiovascular: Negative for chest pain and palpitations  Gastrointestinal: Negative for diarrhea and nausea  Musculoskeletal: Negative for arthralgias and back pain  Skin: Negative for color change and rash  Neurological: Positive for dizziness and numbness  Negative for headaches  Psychiatric/Behavioral: Negative for agitation and behavioral problems  Objective:      /88   Pulse 102   Temp 97 8 °F (36 6 °C) (Tympanic)   Ht 5' 11" (1 803 m)   Wt 105 kg (231 lb 6 4 oz)   SpO2 98%   BMI 32 27 kg/m²          Physical Exam   Constitutional: He is oriented to person, place, and time  He appears well-developed and well-nourished  No distress  Eyes: Pupils are equal, round, and reactive to light  No scleral icterus  Cardiovascular: Normal rate, regular rhythm and normal heart sounds  No murmur heard  Pulmonary/Chest: Effort normal and breath sounds normal  No respiratory distress  He has no wheezes  Abdominal: Soft  Bowel sounds are normal  He exhibits no distension  There is no tenderness  Neurological: He is alert and oriented to person, place, and time  Skin: Skin is warm and dry  No rash noted  He is not diaphoretic  Psychiatric: He has a normal mood and affect

## 2019-02-19 NOTE — TELEPHONE ENCOUNTER
Patient left message at 9:06am   Need to schedule a follow up appointment from being in the hospital over the weekend

## 2019-02-21 ENCOUNTER — TELEPHONE (OUTPATIENT)
Dept: FAMILY MEDICINE CLINIC | Facility: CLINIC | Age: 54
End: 2019-02-21

## 2019-02-21 DIAGNOSIS — I10 ESSENTIAL HYPERTENSION: Primary | ICD-10-CM

## 2019-02-21 RX ORDER — LISINOPRIL 20 MG/1
20 TABLET ORAL DAILY
Qty: 30 TABLET | Refills: 1 | Status: SHIPPED | OUTPATIENT
Start: 2019-02-21 | End: 2019-04-17 | Stop reason: SDUPTHER

## 2019-02-21 NOTE — TELEPHONE ENCOUNTER
Spoke with patient he is still having elevated blood pressure readings (>140/90mmhg) at home,  He would like to re-start with lisinopril 20 mg po once daily, advised to measure BP daily and follow up with me in 2 weeks

## 2019-03-14 ENCOUNTER — OFFICE VISIT (OUTPATIENT)
Dept: FAMILY MEDICINE CLINIC | Facility: CLINIC | Age: 54
End: 2019-03-14
Payer: COMMERCIAL

## 2019-03-14 VITALS
OXYGEN SATURATION: 98 % | DIASTOLIC BLOOD PRESSURE: 90 MMHG | SYSTOLIC BLOOD PRESSURE: 124 MMHG | HEART RATE: 78 BPM | BODY MASS INDEX: 31.36 KG/M2 | WEIGHT: 224 LBS | HEIGHT: 71 IN | TEMPERATURE: 98 F

## 2019-03-14 DIAGNOSIS — R20.0 BILATERAL NUMBNESS AND TINGLING OF ARMS AND LEGS: Primary | ICD-10-CM

## 2019-03-14 DIAGNOSIS — R20.2 BILATERAL NUMBNESS AND TINGLING OF ARMS AND LEGS: Primary | ICD-10-CM

## 2019-03-14 DIAGNOSIS — R42 DIZZINESS: ICD-10-CM

## 2019-03-14 PROBLEM — B08.4 HAND, FOOT AND MOUTH DISEASE: Status: RESOLVED | Noted: 2018-10-15 | Resolved: 2019-03-14

## 2019-03-14 PROBLEM — Z12.2 ENCOUNTER FOR SCREENING FOR LUNG CANCER: Status: RESOLVED | Noted: 2018-08-03 | Resolved: 2019-03-14

## 2019-03-14 PROBLEM — Z12.5 SCREENING FOR PROSTATE CANCER: Status: RESOLVED | Noted: 2018-06-04 | Resolved: 2019-03-14

## 2019-03-14 PROCEDURE — 1036F TOBACCO NON-USER: CPT | Performed by: NURSE PRACTITIONER

## 2019-03-14 PROCEDURE — 99214 OFFICE O/P EST MOD 30 MIN: CPT | Performed by: NURSE PRACTITIONER

## 2019-03-14 PROCEDURE — 3008F BODY MASS INDEX DOCD: CPT | Performed by: NURSE PRACTITIONER

## 2019-03-14 NOTE — PROGRESS NOTES
Assessment/Plan:    No problem-specific Assessment & Plan notes found for this encounter  Diagnoses and all orders for this visit:    Bilateral numbness and tingling of arms and legs  Comments:  will refer to neurology for further evalaution concerns for possible serziure activity with his complaints     Dizziness          Subjective:      Patient ID: Trinity Hawkins is a 47 y o  male  Patient here and rpeorts that about a month ago he was watching TV at home and then he suddenly started to feeling numbness in his both legs started with his feet and then started with both arms as well then he was feeling unwell and went outside and felt like his vision was off and sat down and felt like his breathing was off and felt like his vision changed and went to the hospital and on the way to the hospital drove with family and felt like he was ready to pass out and like he was ready to "check out" and then he went to the hospital and felt like better once he got to the hospital and had a CT scan MRI of the brain TTE and saw neurology and cardiology and was concerning possible TIA and bradycardia possible  Patient saw Dr Summer Arciniega  TTE was completed showing that his EF is 60% MRI was normal  Patient since his hospital stay has been feeling like he is having a pressure on the side of his head and at times feeling that he is getting palpitations and at times is continuing with feeling numbness in his left arm and leg  Patient admits to prior concussion in distant past no recent head injuries, quit drinking alcohol 1 year ago on Monday  Patient lost about 53 pounds over the past year   No seizure history       The following portions of the patient's history were reviewed and updated as appropriate:   He  has a past medical history of Hypertension, Inner ear dysfunction, Irreducible umbilical hernia, and Pneumonia    He   Patient Active Problem List    Diagnosis Date Noted    Bilateral numbness and tingling of arms and legs 02/17/2019    Dizziness 02/17/2019    Essential hypertension 06/04/2018    Obesity (BMI 30-39 9) 06/04/2018     He  has a past surgical history that includes Shoulder surgery; Hernia repair; and Colonoscopy (N/A, 9/16/2016)  His family history is not on file  He  reports that he has quit smoking  He has never used smokeless tobacco  He reports that he drank alcohol  He reports that he has current or past drug history  Drug: Marijuana  He has No Known Allergies       Review of Systems   Constitutional: Positive for fatigue  HENT: Negative  Eyes: Negative  Respiratory: Negative  Cardiovascular: Negative  Gastrointestinal: Negative  Endocrine: Negative  Genitourinary: Negative  Musculoskeletal: Negative  Skin: Negative  Allergic/Immunologic: Negative  Neurological: Positive for weakness, light-headedness, numbness and headaches  Negative for dizziness, tremors, seizures, syncope, facial asymmetry and speech difficulty  Hematological: Negative  Psychiatric/Behavioral: Negative  Objective:      /90   Pulse 78   Temp 98 °F (36 7 °C)   Ht 5' 11" (1 803 m)   Wt 102 kg (224 lb)   SpO2 98%   BMI 31 24 kg/m²          Physical Exam   Constitutional: He is oriented to person, place, and time  Vital signs are normal  He appears well-developed and well-nourished  No distress  HENT:   Head: Normocephalic and atraumatic  Eyes: Pupils are equal, round, and reactive to light  Neck: Normal range of motion  No thyromegaly present  Cardiovascular: Normal rate, regular rhythm, normal heart sounds and intact distal pulses  No murmur heard  Pulmonary/Chest: Effort normal and breath sounds normal  No respiratory distress  He has no wheezes  Abdominal: Soft  Bowel sounds are normal    Musculoskeletal: Normal range of motion  Neurological: He is alert and oriented to person, place, and time  Skin: Skin is warm and dry     Psychiatric: He has a normal mood and affect  Nursing note and vitals reviewed

## 2019-03-18 ENCOUNTER — TELEPHONE (OUTPATIENT)
Dept: FAMILY MEDICINE CLINIC | Facility: CLINIC | Age: 54
End: 2019-03-18

## 2019-03-18 DIAGNOSIS — R42 DIZZINESS: ICD-10-CM

## 2019-03-18 DIAGNOSIS — R20.0 BILATERAL NUMBNESS AND TINGLING OF ARMS AND LEGS: Primary | ICD-10-CM

## 2019-03-18 DIAGNOSIS — R20.2 BILATERAL NUMBNESS AND TINGLING OF ARMS AND LEGS: Primary | ICD-10-CM

## 2019-03-18 NOTE — TELEPHONE ENCOUNTER
When patient was in for appt you told him he was going to go for an EEG and when she called to schedule she was told it was a consult  Brittani Hines can not take off to go see the doctor and then take off for the EEG  Any way you can just order the EEG for him?

## 2019-03-18 NOTE — TELEPHONE ENCOUNTER
Usually the neuro would order I will order the EEG but would prefer to have him see neurology first to determine the correct testing

## 2019-04-11 ENCOUNTER — HOSPITAL ENCOUNTER (OUTPATIENT)
Dept: NEUROLOGY | Facility: HOSPITAL | Age: 54
Discharge: HOME | End: 2019-04-11
Payer: COMMERCIAL

## 2019-04-11 DIAGNOSIS — R42 DIZZINESS: ICD-10-CM

## 2019-04-11 DIAGNOSIS — R20.0 BILATERAL NUMBNESS AND TINGLING OF ARMS AND LEGS: ICD-10-CM

## 2019-04-11 DIAGNOSIS — R20.2 BILATERAL NUMBNESS AND TINGLING OF ARMS AND LEGS: ICD-10-CM

## 2019-04-11 PROCEDURE — 95819 EEG AWAKE AND ASLEEP: CPT | Performed by: PSYCHIATRY & NEUROLOGY

## 2019-04-11 PROCEDURE — 95816 EEG AWAKE AND DROWSY: CPT

## 2019-04-17 DIAGNOSIS — I10 ESSENTIAL HYPERTENSION: ICD-10-CM

## 2019-04-17 RX ORDER — LISINOPRIL 20 MG/1
TABLET ORAL
Qty: 30 TABLET | Refills: 1 | Status: SHIPPED | OUTPATIENT
Start: 2019-04-17 | End: 2019-05-12 | Stop reason: SDUPTHER

## 2019-04-26 ENCOUNTER — OFFICE VISIT (OUTPATIENT)
Dept: FAMILY MEDICINE CLINIC | Facility: CLINIC | Age: 54
End: 2019-04-26
Payer: COMMERCIAL

## 2019-04-26 VITALS
HEIGHT: 71 IN | SYSTOLIC BLOOD PRESSURE: 130 MMHG | OXYGEN SATURATION: 97 % | DIASTOLIC BLOOD PRESSURE: 88 MMHG | WEIGHT: 232.4 LBS | HEART RATE: 65 BPM | TEMPERATURE: 98.7 F | BODY MASS INDEX: 32.53 KG/M2

## 2019-04-26 DIAGNOSIS — I10 ESSENTIAL HYPERTENSION: ICD-10-CM

## 2019-04-26 DIAGNOSIS — F41.9 ANXIETY: Primary | ICD-10-CM

## 2019-04-26 PROCEDURE — 3008F BODY MASS INDEX DOCD: CPT | Performed by: NURSE PRACTITIONER

## 2019-04-26 PROCEDURE — 3079F DIAST BP 80-89 MM HG: CPT | Performed by: NURSE PRACTITIONER

## 2019-04-26 PROCEDURE — 99213 OFFICE O/P EST LOW 20 MIN: CPT | Performed by: NURSE PRACTITIONER

## 2019-04-26 PROCEDURE — 3075F SYST BP GE 130 - 139MM HG: CPT | Performed by: NURSE PRACTITIONER

## 2019-04-26 PROCEDURE — 1036F TOBACCO NON-USER: CPT | Performed by: NURSE PRACTITIONER

## 2019-04-26 RX ORDER — PROPRANOLOL HYDROCHLORIDE 20 MG/1
20 TABLET ORAL 3 TIMES DAILY PRN
Qty: 45 TABLET | Refills: 0 | Status: SHIPPED | OUTPATIENT
Start: 2019-04-26 | End: 2019-05-07 | Stop reason: SDUPTHER

## 2019-05-07 DIAGNOSIS — F41.9 ANXIETY: ICD-10-CM

## 2019-05-07 DIAGNOSIS — I10 ESSENTIAL HYPERTENSION: ICD-10-CM

## 2019-05-07 RX ORDER — PROPRANOLOL HYDROCHLORIDE 20 MG/1
20 TABLET ORAL 3 TIMES DAILY PRN
Qty: 45 TABLET | Refills: 0 | Status: SHIPPED | OUTPATIENT
Start: 2019-05-07 | End: 2019-05-29 | Stop reason: SDUPTHER

## 2019-05-12 DIAGNOSIS — I10 ESSENTIAL HYPERTENSION: ICD-10-CM

## 2019-05-13 RX ORDER — LISINOPRIL 20 MG/1
TABLET ORAL
Qty: 30 TABLET | Refills: 0 | Status: SHIPPED | OUTPATIENT
Start: 2019-05-13 | End: 2019-06-09 | Stop reason: SDUPTHER

## 2019-05-29 DIAGNOSIS — F41.9 ANXIETY: ICD-10-CM

## 2019-05-29 DIAGNOSIS — I10 ESSENTIAL HYPERTENSION: ICD-10-CM

## 2019-05-29 RX ORDER — PROPRANOLOL HYDROCHLORIDE 20 MG/1
20 TABLET ORAL 3 TIMES DAILY PRN
Qty: 45 TABLET | Refills: 0 | Status: SHIPPED | OUTPATIENT
Start: 2019-05-29 | End: 2019-06-17 | Stop reason: SDUPTHER

## 2019-06-09 DIAGNOSIS — I10 ESSENTIAL HYPERTENSION: ICD-10-CM

## 2019-06-10 RX ORDER — LISINOPRIL 20 MG/1
TABLET ORAL
Qty: 30 TABLET | Refills: 0 | Status: SHIPPED | OUTPATIENT
Start: 2019-06-10 | End: 2019-07-11 | Stop reason: SDUPTHER

## 2019-06-17 DIAGNOSIS — F41.9 ANXIETY: ICD-10-CM

## 2019-06-17 DIAGNOSIS — I10 ESSENTIAL HYPERTENSION: ICD-10-CM

## 2019-06-17 RX ORDER — PROPRANOLOL HYDROCHLORIDE 20 MG/1
20 TABLET ORAL 3 TIMES DAILY PRN
Qty: 45 TABLET | Refills: 0 | Status: SHIPPED | OUTPATIENT
Start: 2019-06-17 | End: 2019-07-02 | Stop reason: SDUPTHER

## 2019-06-30 DIAGNOSIS — I10 ESSENTIAL HYPERTENSION: ICD-10-CM

## 2019-06-30 DIAGNOSIS — F41.9 ANXIETY: ICD-10-CM

## 2019-06-30 RX ORDER — PROPRANOLOL HYDROCHLORIDE 20 MG/1
20 TABLET ORAL 3 TIMES DAILY PRN
Qty: 45 TABLET | Refills: 0 | OUTPATIENT
Start: 2019-06-30 | End: 2019-07-15

## 2019-06-30 NOTE — TELEPHONE ENCOUNTER
If he has found the propranolol helpful I can send in the once a day formula which would be the 60 mg in the PM let me know thanks instead of having to take tid

## 2019-07-02 DIAGNOSIS — I10 ESSENTIAL HYPERTENSION: ICD-10-CM

## 2019-07-02 DIAGNOSIS — F41.9 ANXIETY: ICD-10-CM

## 2019-07-02 RX ORDER — PROPRANOLOL HYDROCHLORIDE 20 MG/1
20 TABLET ORAL 3 TIMES DAILY PRN
Qty: 90 TABLET | Refills: 3 | Status: SHIPPED | OUTPATIENT
Start: 2019-07-02 | End: 2019-09-05

## 2019-07-02 NOTE — TELEPHONE ENCOUNTER
Pt notified  He would like to keep this the way it is because sometimes he doesn't take it three times a day

## 2019-07-11 DIAGNOSIS — I10 ESSENTIAL HYPERTENSION: ICD-10-CM

## 2019-07-11 RX ORDER — LISINOPRIL 20 MG/1
TABLET ORAL
Qty: 30 TABLET | Refills: 0 | Status: SHIPPED | OUTPATIENT
Start: 2019-07-11 | End: 2019-08-07 | Stop reason: SDUPTHER

## 2019-08-07 DIAGNOSIS — I10 ESSENTIAL HYPERTENSION: ICD-10-CM

## 2019-08-07 RX ORDER — LISINOPRIL 20 MG/1
TABLET ORAL
Qty: 30 TABLET | Refills: 0 | Status: SHIPPED | OUTPATIENT
Start: 2019-08-07 | End: 2019-09-04 | Stop reason: SDUPTHER

## 2019-09-04 DIAGNOSIS — I10 ESSENTIAL HYPERTENSION: ICD-10-CM

## 2019-09-04 RX ORDER — LISINOPRIL 20 MG/1
TABLET ORAL
Qty: 30 TABLET | Refills: 0 | Status: SHIPPED | OUTPATIENT
Start: 2019-09-04 | End: 2019-10-09 | Stop reason: SDUPTHER

## 2019-09-05 ENCOUNTER — OFFICE VISIT (OUTPATIENT)
Dept: FAMILY MEDICINE CLINIC | Facility: CLINIC | Age: 54
End: 2019-09-05
Payer: COMMERCIAL

## 2019-09-05 VITALS
HEIGHT: 71 IN | DIASTOLIC BLOOD PRESSURE: 90 MMHG | WEIGHT: 233.4 LBS | BODY MASS INDEX: 32.68 KG/M2 | SYSTOLIC BLOOD PRESSURE: 124 MMHG | HEART RATE: 82 BPM | TEMPERATURE: 98.1 F | OXYGEN SATURATION: 97 %

## 2019-09-05 DIAGNOSIS — F41.9 ANXIETY: Primary | ICD-10-CM

## 2019-09-05 DIAGNOSIS — H53.9 CHANGES IN VISION: ICD-10-CM

## 2019-09-05 PROCEDURE — 99213 OFFICE O/P EST LOW 20 MIN: CPT | Performed by: NURSE PRACTITIONER

## 2019-09-05 PROCEDURE — 3008F BODY MASS INDEX DOCD: CPT | Performed by: NURSE PRACTITIONER

## 2019-09-05 RX ORDER — PROPRANOLOL HCL 60 MG
60 CAPSULE, EXTENDED RELEASE 24HR ORAL DAILY
Qty: 30 CAPSULE | Refills: 2 | Status: SHIPPED | OUTPATIENT
Start: 2019-09-05 | End: 2019-11-14 | Stop reason: ALTCHOICE

## 2019-09-05 NOTE — PROGRESS NOTES
Assessment/Plan:    No problem-specific Assessment & Plan notes found for this encounter  Diagnoses and all orders for this visit:    Anxiety  Comments:  DW patient will start the propranolol daily 60 mg in the PM   Orders:  -     propranolol (INDERAL LA) 60 mg 24 hr capsule; Take 1 capsule (60 mg total) by mouth daily for 90 days    Changes in vision  Comments:  will follow up with opthamology           Subjective:      Patient ID: Odie Hatchet is a 49 y o  male  Patient here and reports that he is having anxiety and reports that he is feeling pressure in his head and feeling like when he tries to relax has feeling like his heart was racing and having hard time and falling asleep and reports that he was doing exercise and helps with his anxiety and also getting enough sleep is helping with his anxiety  Patient took two weeks off from lifting and this whole week feeling jittery and feeling like impending doom feeling and just trying to talk himself down from these feelings  Two days ago in the corner of his right eye had vision changes and like he was looking through a prism no changes in night vision  No inciting events that is making his symptoms worse  The following portions of the patient's history were reviewed and updated as appropriate:   He  has a past medical history of Hypertension, Inner ear dysfunction, Irreducible umbilical hernia, and Pneumonia  He   Patient Active Problem List    Diagnosis Date Noted    Changes in vision 09/05/2019    Anxiety 04/26/2019    Bilateral numbness and tingling of arms and legs 02/17/2019    Dizziness 02/17/2019    Essential hypertension 06/04/2018    Obesity (BMI 30-39 9) 06/04/2018     He  has a past surgical history that includes Shoulder surgery (Right); Hernia repair; and Colonoscopy (N/A, 9/16/2016)  His He was adopted  Family history is unknown by patient  He  reports that he quit smoking about 21 years ago   His smoking use included cigarettes  He has a 15 00 pack-year smoking history  He has never used smokeless tobacco  He reports that he drank alcohol  He reports that he has current or past drug history  Drug: Marijuana  He has No Known Allergies       Review of Systems   Constitutional: Negative  HENT: Negative  Eyes: Positive for visual disturbance  Respiratory: Negative  Cardiovascular: Negative  Gastrointestinal: Negative  Endocrine: Negative  Genitourinary: Negative  Musculoskeletal: Negative  Skin: Negative  Allergic/Immunologic: Negative  Neurological: Negative  Hematological: Negative  Psychiatric/Behavioral: Negative  Objective:      /90   Pulse 82   Temp 98 1 °F (36 7 °C)   Ht 5' 11" (1 803 m)   Wt 106 kg (233 lb 6 4 oz)   SpO2 97%   BMI 32 55 kg/m²          Physical Exam   Constitutional: He is oriented to person, place, and time  Vital signs are normal  He appears well-developed and well-nourished  No distress  HENT:   Head: Normocephalic and atraumatic  Eyes: Pupils are equal, round, and reactive to light  Neck: Normal range of motion  No thyromegaly present  Cardiovascular: Normal rate, regular rhythm, normal heart sounds and intact distal pulses  No murmur heard  Pulmonary/Chest: Effort normal and breath sounds normal  No respiratory distress  He has no wheezes  Abdominal: Soft  Bowel sounds are normal    Musculoskeletal: Normal range of motion  Neurological: He is alert and oriented to person, place, and time  Skin: Skin is warm and dry  Psychiatric: He has a normal mood and affect  Nursing note and vitals reviewed  BMI Counseling: Body mass index is 32 55 kg/m²  Discussed the patient's BMI with him  The BMI is above average  BMI counseling and education was provided to the patient   Nutrition recommendations include 3-5 servings of fruits/vegetables daily, reducing fast food intake, consuming healthier snacks, decreasing soda and/or juice intake and moderation in carbohydrate intake  Exercise recommendations include exercising 3-5 times per week

## 2019-10-09 DIAGNOSIS — I10 ESSENTIAL HYPERTENSION: ICD-10-CM

## 2019-10-09 RX ORDER — LISINOPRIL 20 MG/1
TABLET ORAL
Qty: 30 TABLET | Refills: 0 | Status: SHIPPED | OUTPATIENT
Start: 2019-10-09 | End: 2019-10-31 | Stop reason: SDUPTHER

## 2019-10-31 DIAGNOSIS — I10 ESSENTIAL HYPERTENSION: ICD-10-CM

## 2019-10-31 RX ORDER — LISINOPRIL 20 MG/1
TABLET ORAL
Qty: 30 TABLET | Refills: 0 | Status: SHIPPED | OUTPATIENT
Start: 2019-10-31 | End: 2019-12-09 | Stop reason: SDUPTHER

## 2019-11-14 ENCOUNTER — TELEPHONE (OUTPATIENT)
Dept: FAMILY MEDICINE CLINIC | Facility: CLINIC | Age: 54
End: 2019-11-14

## 2019-11-14 NOTE — TELEPHONE ENCOUNTER
Leidy    please call Kati Mckeon regarding his meds and personal info   There wasn't any appointment slots available to bring him and he seemed anxious to speak with you

## 2019-11-19 ENCOUNTER — OFFICE VISIT (OUTPATIENT)
Dept: CARDIOLOGY CLINIC | Facility: CLINIC | Age: 54
End: 2019-11-19
Payer: COMMERCIAL

## 2019-11-19 VITALS
HEIGHT: 71 IN | WEIGHT: 232 LBS | SYSTOLIC BLOOD PRESSURE: 138 MMHG | BODY MASS INDEX: 32.48 KG/M2 | HEART RATE: 55 BPM | DIASTOLIC BLOOD PRESSURE: 90 MMHG | OXYGEN SATURATION: 98 %

## 2019-11-19 DIAGNOSIS — R00.2 PALPITATIONS: ICD-10-CM

## 2019-11-19 DIAGNOSIS — I10 ESSENTIAL HYPERTENSION: Primary | ICD-10-CM

## 2019-11-19 DIAGNOSIS — F41.9 ANXIETY: ICD-10-CM

## 2019-11-19 DIAGNOSIS — R07.9 CHEST PAIN, UNSPECIFIED TYPE: ICD-10-CM

## 2019-11-19 DIAGNOSIS — R42 DIZZINESS: ICD-10-CM

## 2019-11-19 PROCEDURE — 99204 OFFICE O/P NEW MOD 45 MIN: CPT | Performed by: PHYSICIAN ASSISTANT

## 2019-11-19 RX ORDER — PROPRANOLOL HYDROCHLORIDE 20 MG/1
20 TABLET ORAL
COMMUNITY
Start: 2019-05-29 | End: 2019-12-31

## 2019-11-19 NOTE — ASSESSMENT & PLAN NOTE
Associated with episodes of palpitations  Possibly related to anxiety attack  Event monitor ordered to rule out arrhyhtmia

## 2019-11-19 NOTE — ASSESSMENT & PLAN NOTE
Associated with palpitations and dizziness  May be related to anxiety  Stress test is ordered to be sure there is no ischemia causing symptoms

## 2019-11-19 NOTE — ASSESSMENT & PLAN NOTE
Borderline control  Patient would like to control with diet and exercise  Counseled on Low salt High fiber diet

## 2019-11-19 NOTE — PROGRESS NOTES
[facility]   Outpatient Note  Ana Kaur  1965  [id number]  [facility], [facility] Britta Carrillo  [address] [place] [address] [id number]  [place] [phone]  [facility]    Ana Kaur is a 49 y o  male    Assessment and Plan:     Problem List Items Addressed This Visit        Cardiovascular and Mediastinum    Essential hypertension - Primary     Borderline control  Patient would like to control with diet and exercise  Counseled on Low salt High fiber diet          Relevant Medications    propranolol (INDERAL) 20 mg tablet    Other Relevant Orders    Cardiac event monitor       Other    Dizziness     Associated with episodes of palpitations  Possibly related to anxiety attack  Event monitor ordered to rule out arrhyhtmia           Anxiety    Palpitations     Paroxysms of rapid rate  Event monitor is ordered to be sure there is no arhythmia present          Relevant Orders    Cardiac event monitor    Chest pain     Associated with palpitations and dizziness  May be related to anxiety  Stress test is ordered to be sure there is no ischemia causing symptoms          Relevant Orders    Echo stress test w contrast if indicated           Additional Plan:   No medication changes made today  If any arrhythmias are found on holter, I will make medication changes at that time  Surveillance testing as orders outline  Return visit will be in one month or earlier if there are problems  The patient is encouraged to call in the meantime if there are questions or concerns  Subjective:   Isak Rosas is a very active athletic gentlemen who swims laps at the pool and leads a very healthy life-style  For the past few years he has been treated for HTN and has had intermittent feelings of lightheadedness and palpitations  Last year as he was sitting watching TV he noticed paresthesias and racing heart  He sought medical attention as this concerned him    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  He is now on propranolol for anxiety  He has paroxysms of rapid HR, but is not convinced that it is just anxiety  He states that it makes him light headed and "jittery"  He at times has chest tightness, and is concerned because of his family history of early CAD  He had a history of obesity but has lost 50 lbs by cutting out ETOH  He has no CARRASQUILLO, syncope or TIA or  CVA  He has no edema orthopnea or PND  Social History  Social History     Tobacco Use   Smoking Status Former Smoker    Packs/day:     Years: 15 00    Pack years: 15 00    Types: Cigarettes    Last attempt to quit: Mitzi Woody Years since quittin 8   Smokeless Tobacco Never Used   Tobacco Comment    former smoker, per [employer]   ,   Social History     Substance and Sexual Activity   Alcohol Use Not Currently    Comment: being a social drinker, per [employer]   ,   Social History     Substance and Sexual Activity   Drug Use Yes    Types: Marijuana     Family History   Adopted: Yes   Family history unknown: Yes       Medical and Surgical History  Past Medical History:   Diagnosis Date    Hypertension     Inner ear dysfunction     Irreducible umbilical hernia     last assessed 2013    Pneumonia      Past Surgical History:   Procedure Laterality Date    COLONOSCOPY N/A 2016    Procedure: COLONOSCOPY;  Surgeon: Aure Mckenzie MD;  [facility]; Service:     HERNIA REPAIR      umbilical    SHOULDER SURGERY Right          Current Outpatient Medications:     lisinopril (ZESTRIL) 20 mg tablet, daily, Disp: , Rfl:     propranolol (INDERAL) 20 mg tablet, Take 20 mg by mouth, Disp: , Rfl:     lisinopril (ZESTRIL) 20 mg tablet, TAKE 1 TABLET BY MOUTH EVERY DAY (Patient not taking: Reported on 2019), Disp: 30 tablet, Rfl: 0  No Known Allergies    Review of Systems   Constitution: Negative  HENT: Negative  Eyes: Negative      Cardiovascular: Positive for chest pain and palpitations  Negative for claudication, cyanosis, dyspnea on exertion, irregular heartbeat, leg swelling, near-syncope, orthopnea, paroxysmal nocturnal dyspnea and syncope  Respiratory: Negative  Negative for cough, hemoptysis, shortness of breath, sleep disturbances due to breathing, snoring, sputum production and wheezing  Endocrine: Negative  Hematologic/Lymphatic: Negative  Skin: Negative  Musculoskeletal: Negative  Gastrointestinal: Negative  Genitourinary: Negative  Neurological: Positive for light-headedness  Psychiatric/Behavioral: Negative  Allergic/Immunologic: Negative  Objective:   /90 (BP Location: Left arm, Patient Position: Sitting, Cuff Size: Standard)   Pulse 55   Ht 5' 11" (1 803 m)   Wt 105 kg (232 lb)   SpO2 98%   BMI 32 36 kg/m²   Physical Exam   Constitutional: He is oriented to person, place, and time  He appears well-developed and well-nourished  HENT:   Head: Normocephalic and atraumatic  Mouth/Throat: Oropharynx is clear and moist    Eyes: Conjunctivae are normal  No scleral icterus  Neck: Normal range of motion  Neck supple  No JVD present  No thyromegaly present  Cardiovascular: Normal rate, regular rhythm and normal heart sounds  Exam reveals no gallop and no friction rub  No murmur heard  Pulmonary/Chest: No respiratory distress  He has no wheezes  He has no rales  Abdominal: Soft  Bowel sounds are normal  He exhibits no distension  There is no tenderness  Aorta not palpable   Musculoskeletal: Normal range of motion  He exhibits no edema, tenderness or deformity  Neurological: He is alert and oriented to person, place, and time  Skin: Skin is warm and dry  Psychiatric: He has a normal mood and affect  His behavior is normal    Nursing note and vitals reviewed        Lab Review:   No results found for: CHOL  Lab Results   Component Value Date    HDL 46 02/18/2019     Lab Results   Component Value Date [facility] 87 2019     Lab Results   Component Value Date    TRIG 77 2019     Results Reviewed     None        Results Reviewed     None        Results Reviewed     None          Recent Cardiovascular Testin19 ECHO: LEFT VENTRICLE:  Systolic function was normal  Ejection fraction was estimated to be 60 %  There were no regional wall motion abnormalities    LEFT ATRIUM:  The atrium was mildly dilated    RIGHT ATRIUM:  The atrium was mildly dilated    MITRAL VALVE:  There was mild annular calcification  There was trace regurgitation    AORTIC VALVE:  The valve was trileaflet  Leaflets exhibited normal thickness, moderate calcification, and normal cuspal separation  There was trace regurgitation  There were two distinct jets noted possibly related to calcification at the commissures      ECG Review:   19 and 19: Normal sinus rhythm, Normal ECG

## 2019-11-25 ENCOUNTER — HOSPITAL ENCOUNTER (OUTPATIENT)
Dept: NON INVASIVE DIAGNOSTICS | Facility: CLINIC | Age: 54
Discharge: HOME | End: 2019-11-25
Payer: COMMERCIAL

## 2019-11-25 DIAGNOSIS — R07.9 CHEST PAIN, UNSPECIFIED TYPE: ICD-10-CM

## 2019-11-25 LAB
ARRHY DURING EX: NORMAL
CHEST PAIN STATEMENT: NORMAL
MAX DIASTOLIC BP: 90 MMHG
MAX HEART RATE: 169 BPM
MAX PREDICTED HEART RATE: 167 BPM
MAX. SYSTOLIC BP: 180 MMHG
PROTOCOL NAME: NORMAL
REASON FOR TERMINATION: NORMAL
TARGET HR FORMULA: NORMAL
TEST INDICATION: NORMAL
TIME IN EXERCISE PHASE: NORMAL

## 2019-11-25 PROCEDURE — 93350 STRESS TTE ONLY: CPT

## 2019-11-25 PROCEDURE — 93351 STRESS TTE COMPLETE: CPT | Performed by: INTERNAL MEDICINE

## 2019-12-03 DIAGNOSIS — F41.9 ANXIETY: ICD-10-CM

## 2019-12-03 RX ORDER — PROPRANOLOL HCL 60 MG
60 CAPSULE, EXTENDED RELEASE 24HR ORAL DAILY
Qty: 30 CAPSULE | Refills: 2 | Status: SHIPPED | OUTPATIENT
Start: 2019-12-03 | End: 2019-12-31

## 2019-12-09 DIAGNOSIS — I10 ESSENTIAL HYPERTENSION: ICD-10-CM

## 2019-12-09 RX ORDER — LISINOPRIL 20 MG/1
TABLET ORAL
Qty: 30 TABLET | Refills: 0 | Status: SHIPPED | OUTPATIENT
Start: 2019-12-09 | End: 2020-01-08

## 2019-12-31 ENCOUNTER — OFFICE VISIT (OUTPATIENT)
Dept: CARDIOLOGY CLINIC | Facility: CLINIC | Age: 54
End: 2019-12-31
Payer: COMMERCIAL

## 2019-12-31 VITALS
HEIGHT: 71 IN | DIASTOLIC BLOOD PRESSURE: 90 MMHG | BODY MASS INDEX: 33.32 KG/M2 | WEIGHT: 238 LBS | SYSTOLIC BLOOD PRESSURE: 138 MMHG | HEART RATE: 60 BPM

## 2019-12-31 DIAGNOSIS — I49.3 PVC (PREMATURE VENTRICULAR CONTRACTION): ICD-10-CM

## 2019-12-31 DIAGNOSIS — R00.2 PALPITATIONS: ICD-10-CM

## 2019-12-31 DIAGNOSIS — R07.9 CHEST PAIN, UNSPECIFIED TYPE: ICD-10-CM

## 2019-12-31 DIAGNOSIS — I10 ESSENTIAL HYPERTENSION: ICD-10-CM

## 2019-12-31 DIAGNOSIS — I49.8 BIGEMINY: Primary | ICD-10-CM

## 2019-12-31 DIAGNOSIS — F41.9 ANXIETY: ICD-10-CM

## 2019-12-31 DIAGNOSIS — E66.9 OBESITY (BMI 30-39.9): ICD-10-CM

## 2019-12-31 PROCEDURE — 99214 OFFICE O/P EST MOD 30 MIN: CPT | Performed by: PHYSICIAN ASSISTANT

## 2019-12-31 RX ORDER — METOPROLOL SUCCINATE 50 MG/1
50 TABLET, EXTENDED RELEASE ORAL DAILY
Qty: 30 TABLET | Refills: 5 | Status: SHIPPED | OUTPATIENT
Start: 2019-12-31 | End: 2020-01-17

## 2019-12-31 NOTE — ASSESSMENT & PLAN NOTE
Likely contributing to symptoms  However patient is warned not to attribute all symptoms to anxiety and to call if there is any doubt

## 2019-12-31 NOTE — PATIENT INSTRUCTIONS
Stop Propranolol    Start Metoprolol 50 mg      Return in 6 months to one year or earlier if problems     Low-Sodium Diet   WHAT YOU NEED TO KNOW:   A low-sodium diet limits foods that are high in sodium (salt)  You will need to follow a low-sodium diet if you have high blood pressure, kidney disease, or heart failure  You may also need to follow this diet if you have a condition that is causing your body to retain (hold) extra fluid  You may need to limit the amount of sodium you eat to 1,500 mg  Ask your healthcare provider how much sodium you can have each day  DISCHARGE INSTRUCTIONS:   How to use food labels to choose foods that are low in sodium:  Read food labels to find the amount of sodium they contain  The amount of sodium is listed in milligrams (mg)  The % Daily Value (DV) column tells you how much of your daily needs are met by 1 serving of the food for each nutrient listed  Choose foods that have less than 5% of the DV of sodium  These foods are considered low in sodium  Foods that have 20% or more of the DV of sodium are considered high in sodium  Some food labels may also list any of the following terms that tell you about the sodium content in the food:  · Sodium-free:  Less than 5 mg in each serving    · Very low sodium:  35 mg of sodium or less in each serving    · Low sodium:  140 mg of sodium or less in each serving    · Reduced sodium: At least 25% less sodium in each serving than the regular type    · Light in sodium:  50% less sodium in each serving    · Unsalted or no added salt:  No extra salt is added during processing (the food may still contain sodium)  Foods to avoid:  Salty foods are high in sodium   You should avoid the following:  · Processed foods:      ¨ Mixes for cornbread, biscuits, cake, and pudding     ¨ Instant foods, such as potatoes, cereals, noodles, and rice     ¨ Packaged foods, such as bread stuffing, rice and pasta mixes, snack dip mixes, and macaroni and cheese ¨ Canned foods, such as canned vegetables, soups, broths, sauces, and vegetable or tomato juice    ¨ Snack foods, such as salted chips, popcorn, pretzels, pork rinds, salted crackers, and salted nuts    ¨ Frozen foods, such as dinners, entrees, vegetables with sauces, and breaded meats    ¨ Sauerkraut, pickled vegetables, and other foods prepared in brine    · Meats and cheeses:      ¨ Smoked or cured meat, such as corned beef, de los santos, ham, hot dogs, and sausage    ¨ Canned meats or spreads, such as potted meats, sardines, anchovies, and imitation seafood    ¨ Deli or lunch meats, such as bologna, ham, turkey, and roast beef    ¨ Processed cheese, such as American cheese and cheese spreads    · Condiments, sauces, and seasonings:      ¨ Salt (¼ teaspoon of salt contains 575 mg of sodium)    ¨ Seasonings made with salt, such as garlic salt, celery salt, onion salt, and seasoned salt    ¨ Regular soy sauce, barbecue sauce, teriyaki sauce, steak sauce, Worcestershire sauce, and most flavored vinegars    ¨ Canned gravy and mixes     ¨ Regular condiments, such as mustard, ketchup, and salad dressings    ¨ Pickles and olives    ¨ Meat tenderizers and monosodium glutamate (MSG)  Foods to include:  Read the food label to find the amount of sodium in each serving  · Bread and cereal:  Try to choose breads with less than 80 mg of sodium per serving  ¨ Bread, roll, amina, tortilla, or unsalted crackers  ¨ Ready-to-eat cereals with less than 5% DV of sodium (examples include shredded wheat and puffed rice)    ¨ Pasta    · Vegetables and fruits:      ¨ Unsalted fresh, frozen, or canned vegetables    ¨ Fresh, frozen, or canned fruits    ¨ Fruit juice    · Dairy:  One serving has about 150 mg of sodium  ¨ Milk, all types    ¨ Yogurt    ¨ Hard cheese, such as cheddar, Swiss, [employer], or mozzarella    · Meat and other protein foods:  Some raw meats may have added sodium       ¨ Plain meats, fish, and poultry ¨ Egg    · Other foods:      ¨ Homemade pudding    ¨ Unsalted nuts, popcorn, or pretzels    ¨ Unsalted butter or margarine  Ways to decrease sodium:   · Add spices and herbs to foods instead of salt during cooking  Use salt-free seasonings to add flavor to foods  Examples include onion powder, garlic powder, basil, jackson powder, paprika, and parsley  Try lemon or lime juice or vinegar to give foods a tart flavor  Use hot peppers, pepper, or cayenne pepper to add a spicy flavor to foods  · Do not keep a salt shaker at your kitchen table  This may help keep you from adding salt to food at the table  It may take time to get used to enjoying the natural flavor of food instead of adding salt  Talk to your healthcare provider before you use salt substitutes  Some salt substitutes have a high amount of potassium and need to be avoided if you have kidney disease  · Choose low-sodium foods at restaurants  Meals from restaurants are often high in sodium  Some restaurants have nutrition information on the menu that tells you the amount of sodium in their foods  If possible, ask for your food to be prepared with less, or no salt  · Shop for unsalted or low-sodium foods and snacks at the grocery store  Examples include unsalted or low-sodium broths, soups, and canned vegetables  Choose fresh or frozen vegetables instead  Choose unsalted nuts or seeds or fresh fruits or vegetables as snacks  Read food labels and choose salt-free, very low-sodium, or low-sodium foods  © 2017 [address]  Information is for End User's use only and may not be sold, redistributed or otherwise used for commercial purposes  All illustrations and images included in CareNotes® are the copyrighted property of [employer]  or Angel Sandoval  The above information is an  only  It is not intended as medical advice for individual conditions or treatments   Talk to your doctor, nurse or pharmacist before following any medical regimen to see if it is safe and effective for you

## 2019-12-31 NOTE — PROGRESS NOTES
[facility]   Outpatient Note  Rebecca Brewer  1965  [id number]  [facility], [facility] [address]  [place] [id number]  [facility]    Rebecca Brewer is a 49 y o  male    Assessment and Plan:   1  Bigeminy  Assessment & Plan:  Runs of ventricular ectopy in bigeminy noted on Event monitor in the setting of preserved LV function   I am going to switch propranolol to metoprolol to better control ventricular ectopic beats       2  Obesity (BMI 30-39 9)    3  Essential hypertension  Assessment & Plan:  Not optimal    BB changed today    He is to keep a diary and call if there are any concerns       4  Anxiety  Assessment & Plan:  Likely contributing to symptoms  However patient is warned not to attribute all symptoms to anxiety and to call if there is any doubt  5  Palpitations    6  PVC (premature ventricular contraction)    7  Chest pain, unspecified type  Assessment & Plan:  Resolved  Normal echo and stress test          Additional Plan:   Medications as detailed above  No Surveillance testing ordered at this point in time  Return visit will be in 6 months to one year or earlier if there are problems  The patient is encouraged to call in the meantime if there are questions or concerns  Subjective:   Renay Zavala is a very active athletic gentlemen who swims laps at the pool and leads a very healthy life-style  For the past few years he has been treated for HTN and has had intermittent feelings of lightheadedness and palpitations  Last year as he was sitting watching TV he noticed paresthesias and racing heart  He sought medical attention as this concerned him  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  He is now on propranolol for anxiety  He has paroxysms of rapid HR, but is not convinced that it is just anxiety  He states that it makes him light headed and "jittery"   He at times has chest tightness, and is concerned because of his family history of early CAD  He had a history of obesity but has lost 50 lbs by cutting out ETOH  He has no CARRASQUILLO, syncope or TIA or  CVA  He has no edema orthopnea or PND  His most recent stress test and echo are normal  An event monitor revealed ventricular ectopy in [facility]  Social History  Social History     Tobacco Use   Smoking Status Former Smoker    Packs/day: 1 00    Years: 15 00    Pack years: 15 00    Types: Cigarettes    Last attempt to quit: Toya Greenberg Years since quittin 0   Smokeless Tobacco Never Used   Tobacco Comment    former smoker, per [employer]   ,   Social History     Substance and Sexual Activity   Alcohol Use Not Currently    Comment: being a social drinker, per [employer]   ,   Social History     Substance and Sexual Activity   Drug Use Yes    Types: Marijuana     Family History   Adopted: Yes   Family history unknown: Yes       Medical and Surgical History  Past Medical History:   Diagnosis Date    Hypertension     Inner ear dysfunction     Irreducible umbilical hernia     last assessed 2013    Pneumonia      Past Surgical History:   Procedure Laterality Date    COLONOSCOPY N/A 2016    Procedure: COLONOSCOPY;  Surgeon: Antonio Mendez MD;  [facility]; Service:     HERNIA REPAIR      umbilical    SHOULDER SURGERY Right          Current Outpatient Medications:     lisinopril (ZESTRIL) 20 mg tablet, daily, Disp: , Rfl:     lisinopril (ZESTRIL) 20 mg tablet, TAKE 1 TABLET BY MOUTH EVERY DAY (Patient not taking: Reported on 2019), Disp: 30 tablet, Rfl: 0  No Known Allergies    Review of Systems   Constitution: Negative  HENT: Negative  Eyes: Negative  Cardiovascular: Negative  Negative for chest pain, claudication, cyanosis, dyspnea on exertion, irregular heartbeat, leg swelling, near-syncope, orthopnea, palpitations, paroxysmal nocturnal dyspnea and syncope     Respiratory: Negative  Negative for cough, hemoptysis, shortness of breath, sleep disturbances due to breathing, snoring, sputum production and wheezing  Endocrine: Negative  Hematologic/Lymphatic: Negative  Skin: Negative  Musculoskeletal: Negative  Gastrointestinal: Negative  Genitourinary: Negative  Neurological: Negative  Psychiatric/Behavioral: Negative  Allergic/Immunologic: Negative  Objective:   /90   Pulse 60   Ht 5' 11" (1 803 m)   Wt 108 kg (238 lb)   BMI 33 19 kg/m²   Physical Exam   Constitutional: He is oriented to person, place, and time  He appears well-developed and well-nourished  HENT:   Head: Normocephalic and atraumatic  Mouth/Throat: Oropharynx is clear and moist    Eyes: Conjunctivae are normal  No scleral icterus  Neck: Normal range of motion  Neck supple  No JVD present  No thyromegaly present  Cardiovascular: Normal rate, regular rhythm and normal heart sounds  Exam reveals no gallop and no friction rub  No murmur heard  Pulmonary/Chest: No respiratory distress  He has no wheezes  He has no rales  Abdominal: Soft  Bowel sounds are normal  He exhibits no distension  There is no tenderness  Aorta not palpable   Musculoskeletal: Normal range of motion  He exhibits no edema, tenderness or deformity  Neurological: He is alert and oriented to person, place, and time  Skin: Skin is warm and dry  Psychiatric: He has a normal mood and affect  His behavior is normal    Nursing note and vitals reviewed        Lab Review:   No results found for: CHOL  Lab Results   Component Value Date    HDL 46 02/18/2019     Lab Results   Component Value Date    LDLCALC 87 02/18/2019     Lab Results   Component Value Date    TRIG 77 02/18/2019     Results Reviewed     None        Results Reviewed     None        Results Reviewed     None          Recent Cardiovascular Testing:   BROWN 11-25-19: Normal study after maximal exercise without reproduction of symptoms    Event Monitor 11-19-19: Sinus Rhythm with PVC, and runs of Bigeminy     ECG Review:   2-17-19: Normal sinus rhythm  Normal ECG  When compared with ECG of 17-FEB-2019 09:37,

## 2019-12-31 NOTE — ASSESSMENT & PLAN NOTE
Runs of ventricular ectopy in bigeminy noted on Event monitor in the setting of preserved LV function   I am going to switch propranolol to metoprolol to better control ventricular ectopic beats

## 2020-01-08 DIAGNOSIS — I10 ESSENTIAL HYPERTENSION: ICD-10-CM

## 2020-01-08 RX ORDER — LISINOPRIL 20 MG/1
TABLET ORAL
Qty: 30 TABLET | Refills: 5 | Status: SHIPPED | OUTPATIENT
Start: 2020-01-08 | End: 2020-03-30 | Stop reason: SDUPTHER

## 2020-01-17 ENCOUNTER — TELEPHONE (OUTPATIENT)
Dept: CARDIOLOGY CLINIC | Facility: CLINIC | Age: 55
End: 2020-01-17

## 2020-01-17 DIAGNOSIS — I49.8 BIGEMINY: Primary | ICD-10-CM

## 2020-01-17 RX ORDER — PROPRANOLOL HCL 60 MG
60 CAPSULE, EXTENDED RELEASE 24HR ORAL DAILY
Qty: 30 CAPSULE | Refills: 5 | Status: SHIPPED | OUTPATIENT
Start: 2020-01-17 | End: 2020-02-18

## 2020-01-17 NOTE — TELEPHONE ENCOUNTER
Patients wife was requesting that Heladiodiana Arlette talks to you about his medications and is requesting a call from you?

## 2020-01-17 NOTE — TELEPHONE ENCOUNTER
Called patient he is feeling very anxious off propranolol and would like to take it again  Prescribed propranolol 60 mg daily and will check a Zio patch for two weeks  He will return for an appointment in four weeks

## 2020-02-13 ENCOUNTER — CLINICAL SUPPORT (OUTPATIENT)
Dept: CARDIOLOGY CLINIC | Facility: CLINIC | Age: 55
End: 2020-02-13
Payer: COMMERCIAL

## 2020-02-13 DIAGNOSIS — R00.2 PALPITATIONS: ICD-10-CM

## 2020-02-13 DIAGNOSIS — I49.8 BIGEMINY: ICD-10-CM

## 2020-02-13 DIAGNOSIS — R42 DIZZINESS: ICD-10-CM

## 2020-02-13 PROCEDURE — 0298T PR EXT ECG > 48HR TO 21 DAY REVIEW AND INTERPRETATN: CPT | Performed by: INTERNAL MEDICINE

## 2020-02-18 ENCOUNTER — OFFICE VISIT (OUTPATIENT)
Dept: CARDIOLOGY CLINIC | Facility: CLINIC | Age: 55
End: 2020-02-18
Payer: COMMERCIAL

## 2020-02-18 VITALS
DIASTOLIC BLOOD PRESSURE: 86 MMHG | HEIGHT: 71 IN | SYSTOLIC BLOOD PRESSURE: 130 MMHG | HEART RATE: 62 BPM | BODY MASS INDEX: 33.6 KG/M2 | WEIGHT: 240 LBS

## 2020-02-18 DIAGNOSIS — R07.9 CHEST PAIN, UNSPECIFIED TYPE: ICD-10-CM

## 2020-02-18 DIAGNOSIS — I49.8 BIGEMINY: ICD-10-CM

## 2020-02-18 DIAGNOSIS — I10 ESSENTIAL HYPERTENSION: Primary | ICD-10-CM

## 2020-02-18 DIAGNOSIS — R00.2 PALPITATIONS: ICD-10-CM

## 2020-02-18 DIAGNOSIS — R42 DIZZINESS: ICD-10-CM

## 2020-02-18 DIAGNOSIS — F41.9 ANXIETY: ICD-10-CM

## 2020-02-18 DIAGNOSIS — I47.2 VENTRICULAR TACHYARRHYTHMIA (HCC): ICD-10-CM

## 2020-02-18 PROBLEM — R20.2 BILATERAL NUMBNESS AND TINGLING OF ARMS AND LEGS: Status: RESOLVED | Noted: 2019-02-17 | Resolved: 2020-02-18

## 2020-02-18 PROBLEM — R20.0 BILATERAL NUMBNESS AND TINGLING OF ARMS AND LEGS: Status: RESOLVED | Noted: 2019-02-17 | Resolved: 2020-02-18

## 2020-02-18 PROBLEM — I47.20 VENTRICULAR TACHYARRHYTHMIA: Status: ACTIVE | Noted: 2020-02-18

## 2020-02-18 PROBLEM — H53.9 CHANGES IN VISION: Status: RESOLVED | Noted: 2019-09-05 | Resolved: 2020-02-18

## 2020-02-18 PROCEDURE — 1036F TOBACCO NON-USER: CPT | Performed by: PHYSICIAN ASSISTANT

## 2020-02-18 PROCEDURE — 3008F BODY MASS INDEX DOCD: CPT | Performed by: PHYSICIAN ASSISTANT

## 2020-02-18 PROCEDURE — 3079F DIAST BP 80-89 MM HG: CPT | Performed by: PHYSICIAN ASSISTANT

## 2020-02-18 PROCEDURE — 99214 OFFICE O/P EST MOD 30 MIN: CPT | Performed by: PHYSICIAN ASSISTANT

## 2020-02-18 PROCEDURE — 3075F SYST BP GE 130 - 139MM HG: CPT | Performed by: PHYSICIAN ASSISTANT

## 2020-02-18 RX ORDER — PROPRANOLOL HYDROCHLORIDE 80 MG/1
80 CAPSULE, EXTENDED RELEASE ORAL DAILY
Qty: 30 CAPSULE | Refills: 3 | Status: SHIPPED | OUTPATIENT
Start: 2020-02-18 | End: 2020-03-26 | Stop reason: SDUPTHER

## 2020-02-18 NOTE — ASSESSMENT & PLAN NOTE
He is attributing all of his symptoms to anxiety and would like to remain on propranolol as this is what makes him "feel better"

## 2020-02-18 NOTE — PROGRESS NOTES
[facility]   Outpatient Note  Christopher Whyte  1965  [id number]  [facility] [place]  [id number]  [place] [address]  [facility]    Christopher Whyte is a 49 y o  male    Assessment and Plan:   Bigeminy  Runs of ventricular ectopy in bigeminy noted on Event monitor in the setting of preserved LV function   ZIO also revealed similar findings     Patient feels much better on Propranolol than Metoprolol from an anxiety perspective  I am going to increase Propranolol do to see if this will control the frequency and length of Ventricular ectopy  Anxiety  He is attributing all of his symptoms to anxiety and would like to remain on propranolol as this is what makes him "feel better"     Dizziness  Less frequent     Essential hypertension  Controlled on current medication     Palpitations  Complicated by feeling of anxiety   See comments     Ventricular tachyarrhythmia (HCC)  Runs of VT noted on Event recorder and on ZIO Patch   I will have him increase propranolol dose to try to control his ventricular ectopy   He will return in two months and report his symptoms  At that time we will considered the need for EP involvement  He will discuss all of this with Dr Chantelle Bustillos at his next visit  Additional Plan:   Medications as detailed above  No other tests ordered today  Return visit will be in one month to one year or earlier if there are problems  The patient is encouraged to call in the meantime if there are questions or concerns  Subjective:   Kati Mckeon is a very active athletic gentlemen who swims laps at the pool and leads a very healthy life-style  For the past few years he has been treated for HTN and has had intermittent feelings of lightheadedness and palpitations  Last year as he was sitting watching TV he noticed paresthesias and racing heart   He sought medical attention as this concerned him   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  He is now on propranolol for anxiety  He has paroxysms of rapid HR, but is not convinced that it is just anxiety  He states that it makes him light headed and "jittery"  He at times has chest tightness, and is concerned because of his family history of early CAD  He had a history of obesity but has lost 50 lbs by cutting out ETOH  He has no CARRASQUILLO, syncope or TIA or  CVA  He has no edema orthopnea or PND  His most recent stress test and echo are normal  An event monitor revealed ventricular ectopy in bigeminy  Zio patch revealed the same with VT and SVT Bigeminy  Triplets and couplets       Social History  Social History     Tobacco Use   Smoking Status Former Smoker    Packs/day:     Years: 15 00    Pack years: 15 00    Types: Cigarettes    Last attempt to quit: Alexis Michael Years since quittin 1   Smokeless Tobacco Never Used   Tobacco Comment    former smoker, per [employer]   ,   Social History     Substance and Sexual Activity   Alcohol Use Not Currently    Comment: being a social drinker, per [employer]   ,   Social History     Substance and Sexual Activity   Drug Use Yes    Types: Marijuana     Family History   Adopted: Yes   Family history unknown: Yes       Medical and Surgical History  Past Medical History:   Diagnosis Date    Hypertension     Inner ear dysfunction     Irreducible umbilical hernia     last assessed 2013    Pneumonia      Past Surgical History:   Procedure Laterality Date    COLONOSCOPY N/A 2016    Procedure: COLONOSCOPY;  Surgeon: Maciej Benitez MD;  [facility];   Service:     HERNIA REPAIR      umbilical    SHOULDER SURGERY Right          Current Outpatient Medications:     lisinopril (ZESTRIL) 20 mg tablet, TAKE 1 TABLET BY MOUTH EVERY DAY, Disp: 30 tablet, Rfl: 5    propranolol (INDERAL LA) 80 mg 24 hr capsule, Take 1 capsule (80 mg total) by mouth daily, Disp: 30 capsule, Rfl: 3    lisinopril (ZESTRIL) 20 mg tablet, daily, Disp: , Rfl:   No Known Allergies    Review of Systems   Constitution: Negative  HENT: Negative  Eyes: Negative  Cardiovascular: Negative  Negative for chest pain, claudication, cyanosis, dyspnea on exertion, irregular heartbeat, leg swelling, near-syncope, orthopnea, palpitations, paroxysmal nocturnal dyspnea and syncope  Respiratory: Negative  Negative for cough, hemoptysis, shortness of breath, sleep disturbances due to breathing, snoring, sputum production and wheezing  Endocrine: Negative  Hematologic/Lymphatic: Negative  Skin: Negative  Musculoskeletal: Negative  Gastrointestinal: Negative  Genitourinary: Negative  Neurological: Negative  Psychiatric/Behavioral: Negative  Allergic/Immunologic: Negative  Objective:   /86   Pulse 62   Ht 5' 11" (1 803 m)   Wt 109 kg (240 lb)   BMI 33 47 kg/m²   Physical Exam   Constitutional: He is oriented to person, place, and time  He appears well-developed and well-nourished  HENT:   Head: Normocephalic and atraumatic  Mouth/Throat: Oropharynx is clear and moist    Eyes: Conjunctivae are normal  No scleral icterus  Neck: Normal range of motion  Neck supple  No JVD present  No thyromegaly present  Cardiovascular: Normal rate, regular rhythm and normal heart sounds  Exam reveals no gallop and no friction rub  No murmur heard  Pulmonary/Chest: No respiratory distress  He has no wheezes  He has no rales  Abdominal: Soft  Bowel sounds are normal  He exhibits no distension  There is no tenderness  Aorta not palpable   Musculoskeletal: Normal range of motion  He exhibits no edema, tenderness or deformity  Neurological: He is alert and oriented to person, place, and time  Skin: Skin is warm and dry  Psychiatric: He has a normal mood and affect  His behavior is normal    Nursing note and vitals reviewed        Lab Review:   No results found for: CHOL  Lab Results   Component Value Date    HDL 46 02/18/2019     Lab Results   Component Value Date    LDLCALC 87 02/18/2019     Lab Results   Component Value Date    TRIG 77 02/18/2019     Results Reviewed     None        Results Reviewed     None        Results Reviewed     None          Recent Cardiovascular Testing:     Echo 2-18-19 Normal LV, No WMA, EF 60%, trace MR      BROWN 11-25-19: Normal study after maximal exercise without reproduction of symptoms    Event Monitor 11-19-19: Sinus Rhythm with PVC, and runs of Bigeminy   Zio patch 2-13-22:  VT and SVT Bigeminy   Triplets and couplets     ECG Review:   2-17-19: Normal sinus rhythm  Normal ECG  When compared with ECG of 17-FEB-2019 09:37,

## 2020-02-18 NOTE — ASSESSMENT & PLAN NOTE
Runs of ventricular ectopy in bigeminy noted on Event monitor in the setting of preserved LV function   ZIO also revealed similar findings     Patient feels much better on Propranolol than Metoprolol from an anxiety perspective  I am going to increase Propranolol do to see if this will control the frequency and length of Ventricular ectopy

## 2020-02-18 NOTE — ASSESSMENT & PLAN NOTE
Runs of VT noted on Event recorder and on ZIO Patch   I will have him increase propranolol dose to try to control his ventricular ectopy   He will return in two months and report his symptoms  At that time we will considered the need for EP involvement  He will discuss all of this with Dr Chai Ortiz at his next visit

## 2020-03-26 DIAGNOSIS — I49.8 BIGEMINY: ICD-10-CM

## 2020-03-26 RX ORDER — PROPRANOLOL HYDROCHLORIDE 80 MG/1
80 CAPSULE, EXTENDED RELEASE ORAL DAILY
Qty: 90 CAPSULE | Refills: 3 | Status: SHIPPED | OUTPATIENT
Start: 2020-03-26 | End: 2020-05-29 | Stop reason: DRUGHIGH

## 2020-03-30 DIAGNOSIS — I10 ESSENTIAL HYPERTENSION: ICD-10-CM

## 2020-03-30 RX ORDER — LISINOPRIL 20 MG/1
20 TABLET ORAL DAILY
Qty: 90 TABLET | Refills: 1 | Status: SHIPPED | OUTPATIENT
Start: 2020-03-30 | End: 2020-04-10

## 2020-04-10 DIAGNOSIS — I10 ESSENTIAL HYPERTENSION: ICD-10-CM

## 2020-04-10 RX ORDER — LISINOPRIL 20 MG/1
20 TABLET ORAL DAILY
Qty: 90 TABLET | Refills: 1 | Status: SHIPPED | OUTPATIENT
Start: 2020-04-10 | End: 2020-11-27

## 2020-05-29 ENCOUNTER — OFFICE VISIT (OUTPATIENT)
Dept: CARDIOLOGY CLINIC | Facility: CLINIC | Age: 55
End: 2020-05-29
Payer: COMMERCIAL

## 2020-05-29 VITALS
SYSTOLIC BLOOD PRESSURE: 160 MMHG | HEIGHT: 71 IN | BODY MASS INDEX: 33.6 KG/M2 | DIASTOLIC BLOOD PRESSURE: 90 MMHG | HEART RATE: 58 BPM | WEIGHT: 240 LBS

## 2020-05-29 DIAGNOSIS — F41.9 ANXIETY: ICD-10-CM

## 2020-05-29 DIAGNOSIS — I10 ESSENTIAL HYPERTENSION: ICD-10-CM

## 2020-05-29 DIAGNOSIS — I49.8 BIGEMINY: Primary | ICD-10-CM

## 2020-05-29 PROCEDURE — 1036F TOBACCO NON-USER: CPT | Performed by: INTERNAL MEDICINE

## 2020-05-29 PROCEDURE — 3077F SYST BP >= 140 MM HG: CPT | Performed by: INTERNAL MEDICINE

## 2020-05-29 PROCEDURE — 3008F BODY MASS INDEX DOCD: CPT | Performed by: INTERNAL MEDICINE

## 2020-05-29 PROCEDURE — 99214 OFFICE O/P EST MOD 30 MIN: CPT | Performed by: INTERNAL MEDICINE

## 2020-05-29 PROCEDURE — 3080F DIAST BP >= 90 MM HG: CPT | Performed by: INTERNAL MEDICINE

## 2020-05-29 RX ORDER — METOPROLOL TARTRATE 50 MG/1
50 TABLET, FILM COATED ORAL EVERY 12 HOURS SCHEDULED
Qty: 180 TABLET | Refills: 5 | Status: SHIPPED | OUTPATIENT
Start: 2020-05-29 | End: 2021-10-19 | Stop reason: SDUPTHER

## 2020-10-02 ENCOUNTER — OFFICE VISIT (OUTPATIENT)
Dept: FAMILY MEDICINE CLINIC | Facility: CLINIC | Age: 55
End: 2020-10-02
Payer: COMMERCIAL

## 2020-10-02 VITALS
WEIGHT: 247 LBS | SYSTOLIC BLOOD PRESSURE: 132 MMHG | DIASTOLIC BLOOD PRESSURE: 84 MMHG | HEIGHT: 71 IN | BODY MASS INDEX: 34.58 KG/M2 | OXYGEN SATURATION: 97 % | TEMPERATURE: 97.6 F | HEART RATE: 81 BPM

## 2020-10-02 DIAGNOSIS — N52.9 IMPOTENCE DUE TO ERECTILE DYSFUNCTION: ICD-10-CM

## 2020-10-02 DIAGNOSIS — Z12.5 SCREENING FOR PROSTATE CANCER: ICD-10-CM

## 2020-10-02 DIAGNOSIS — F41.9 ANXIETY: ICD-10-CM

## 2020-10-02 DIAGNOSIS — Z00.00 ANNUAL PHYSICAL EXAM: Primary | ICD-10-CM

## 2020-10-02 DIAGNOSIS — I10 ESSENTIAL HYPERTENSION: ICD-10-CM

## 2020-10-02 PROCEDURE — 1036F TOBACCO NON-USER: CPT | Performed by: NURSE PRACTITIONER

## 2020-10-02 PROCEDURE — 3079F DIAST BP 80-89 MM HG: CPT | Performed by: NURSE PRACTITIONER

## 2020-10-02 PROCEDURE — 3075F SYST BP GE 130 - 139MM HG: CPT | Performed by: NURSE PRACTITIONER

## 2020-10-02 PROCEDURE — 99396 PREV VISIT EST AGE 40-64: CPT | Performed by: NURSE PRACTITIONER

## 2020-10-02 PROCEDURE — 3725F SCREEN DEPRESSION PERFORMED: CPT | Performed by: NURSE PRACTITIONER

## 2020-10-02 RX ORDER — SILDENAFIL 50 MG/1
50 TABLET, FILM COATED ORAL DAILY PRN
Qty: 10 TABLET | Refills: 0 | Status: SHIPPED | OUTPATIENT
Start: 2020-10-02 | End: 2020-11-09

## 2020-11-07 DIAGNOSIS — N52.9 IMPOTENCE DUE TO ERECTILE DYSFUNCTION: ICD-10-CM

## 2020-11-09 RX ORDER — SILDENAFIL 50 MG/1
TABLET, FILM COATED ORAL
Qty: 6 TABLET | Refills: 1 | Status: SHIPPED | OUTPATIENT
Start: 2020-11-09 | End: 2021-01-07

## 2020-11-27 DIAGNOSIS — I10 ESSENTIAL HYPERTENSION: ICD-10-CM

## 2020-11-27 RX ORDER — LISINOPRIL 20 MG/1
TABLET ORAL
Qty: 90 TABLET | Refills: 3 | Status: SHIPPED | OUTPATIENT
Start: 2020-11-27 | End: 2021-06-28

## 2020-12-05 ENCOUNTER — TELEPHONE (OUTPATIENT)
Dept: OTHER | Facility: OTHER | Age: 55
End: 2020-12-05

## 2020-12-06 ENCOUNTER — DOCUMENTATION (OUTPATIENT)
Dept: URGENT CARE | Facility: MEDICAL CENTER | Age: 55
End: 2020-12-06

## 2021-01-07 DIAGNOSIS — N52.9 IMPOTENCE DUE TO ERECTILE DYSFUNCTION: ICD-10-CM

## 2021-01-07 RX ORDER — SILDENAFIL 50 MG/1
TABLET, FILM COATED ORAL
Qty: 6 TABLET | Refills: 1 | Status: SHIPPED | OUTPATIENT
Start: 2021-01-07 | End: 2021-03-17

## 2021-03-16 DIAGNOSIS — N52.9 IMPOTENCE DUE TO ERECTILE DYSFUNCTION: ICD-10-CM

## 2021-03-17 RX ORDER — SILDENAFIL 50 MG/1
TABLET, FILM COATED ORAL
Qty: 6 TABLET | Refills: 1 | Status: SHIPPED | OUTPATIENT
Start: 2021-03-17 | End: 2021-05-24

## 2021-05-24 DIAGNOSIS — N52.9 IMPOTENCE DUE TO ERECTILE DYSFUNCTION: ICD-10-CM

## 2021-05-24 RX ORDER — SILDENAFIL 50 MG/1
TABLET, FILM COATED ORAL
Qty: 6 TABLET | Refills: 1 | Status: SHIPPED | OUTPATIENT
Start: 2021-05-24 | End: 2021-10-26

## 2021-07-01 DIAGNOSIS — I10 ESSENTIAL HYPERTENSION: ICD-10-CM

## 2021-07-01 RX ORDER — LISINOPRIL 20 MG/1
20 TABLET ORAL DAILY
Qty: 7 TABLET | Refills: 1 | Status: SHIPPED | OUTPATIENT
Start: 2021-07-01 | End: 2021-10-04 | Stop reason: SDUPTHER

## 2021-10-04 ENCOUNTER — NURSE TRIAGE (OUTPATIENT)
Dept: OTHER | Facility: OTHER | Age: 56
End: 2021-10-04

## 2021-10-04 DIAGNOSIS — I10 ESSENTIAL HYPERTENSION: Primary | ICD-10-CM

## 2021-10-04 DIAGNOSIS — I10 ESSENTIAL HYPERTENSION: ICD-10-CM

## 2021-10-04 RX ORDER — LISINOPRIL 20 MG/1
20 TABLET ORAL DAILY
Qty: 7 TABLET | Refills: 0 | Status: SHIPPED | OUTPATIENT
Start: 2021-10-04 | End: 2021-11-02 | Stop reason: SDUPTHER

## 2021-10-04 RX ORDER — LISINOPRIL 20 MG/1
20 TABLET ORAL DAILY
Qty: 30 TABLET | Refills: 0 | Status: SHIPPED | OUTPATIENT
Start: 2021-10-04 | End: 2021-10-19 | Stop reason: SDUPTHER

## 2021-10-13 ENCOUNTER — APPOINTMENT (EMERGENCY)
Dept: RADIOLOGY | Facility: HOSPITAL | Age: 56
End: 2021-10-13
Payer: COMMERCIAL

## 2021-10-13 ENCOUNTER — HOSPITAL ENCOUNTER (EMERGENCY)
Facility: HOSPITAL | Age: 56
Discharge: HOME | End: 2021-10-13
Attending: EMERGENCY MEDICINE
Payer: COMMERCIAL

## 2021-10-13 VITALS
TEMPERATURE: 98.5 F | HEART RATE: 76 BPM | OXYGEN SATURATION: 97 % | SYSTOLIC BLOOD PRESSURE: 154 MMHG | RESPIRATION RATE: 18 BRPM | DIASTOLIC BLOOD PRESSURE: 87 MMHG

## 2021-10-13 DIAGNOSIS — S80.01XA CONTUSION OF RIGHT KNEE, INITIAL ENCOUNTER: Primary | ICD-10-CM

## 2021-10-13 PROCEDURE — 99283 EMERGENCY DEPT VISIT LOW MDM: CPT

## 2021-10-13 PROCEDURE — 99284 EMERGENCY DEPT VISIT MOD MDM: CPT | Performed by: PHYSICIAN ASSISTANT

## 2021-10-13 PROCEDURE — 73564 X-RAY EXAM KNEE 4 OR MORE: CPT

## 2021-10-13 RX ORDER — NAPROXEN 250 MG/1
500 TABLET ORAL ONCE
Status: COMPLETED | OUTPATIENT
Start: 2021-10-13 | End: 2021-10-13

## 2021-10-13 RX ORDER — NAPROXEN 500 MG/1
500 TABLET ORAL 2 TIMES DAILY WITH MEALS
Qty: 30 TABLET | Refills: 0 | Status: SHIPPED | OUTPATIENT
Start: 2021-10-13

## 2021-10-13 RX ADMIN — NAPROXEN 500 MG: 250 TABLET ORAL at 09:55

## 2021-10-19 ENCOUNTER — OFFICE VISIT (OUTPATIENT)
Dept: CARDIOLOGY CLINIC | Facility: CLINIC | Age: 56
End: 2021-10-19
Payer: COMMERCIAL

## 2021-10-19 VITALS
WEIGHT: 257 LBS | HEART RATE: 88 BPM | DIASTOLIC BLOOD PRESSURE: 90 MMHG | SYSTOLIC BLOOD PRESSURE: 150 MMHG | HEIGHT: 71 IN | BODY MASS INDEX: 35.98 KG/M2

## 2021-10-19 DIAGNOSIS — I10 ESSENTIAL HYPERTENSION: ICD-10-CM

## 2021-10-19 DIAGNOSIS — E66.9 OBESITY (BMI 30-39.9): ICD-10-CM

## 2021-10-19 DIAGNOSIS — I49.8 BIGEMINY: Primary | ICD-10-CM

## 2021-10-19 PROCEDURE — 99214 OFFICE O/P EST MOD 30 MIN: CPT | Performed by: INTERNAL MEDICINE

## 2021-10-19 PROCEDURE — 3008F BODY MASS INDEX DOCD: CPT | Performed by: INTERNAL MEDICINE

## 2021-10-19 PROCEDURE — 93000 ELECTROCARDIOGRAM COMPLETE: CPT | Performed by: INTERNAL MEDICINE

## 2021-10-19 PROCEDURE — 1036F TOBACCO NON-USER: CPT | Performed by: INTERNAL MEDICINE

## 2021-10-19 RX ORDER — METOPROLOL SUCCINATE 100 MG/1
100 TABLET, EXTENDED RELEASE ORAL DAILY
Qty: 90 TABLET | Refills: 5 | Status: SHIPPED | OUTPATIENT
Start: 2021-10-19

## 2021-10-26 DIAGNOSIS — N52.9 IMPOTENCE DUE TO ERECTILE DYSFUNCTION: ICD-10-CM

## 2021-10-26 RX ORDER — SILDENAFIL 50 MG/1
TABLET, FILM COATED ORAL
Qty: 6 TABLET | Refills: 1 | Status: SHIPPED | OUTPATIENT
Start: 2021-10-26 | End: 2022-01-24

## 2021-11-02 ENCOUNTER — OFFICE VISIT (OUTPATIENT)
Dept: FAMILY MEDICINE CLINIC | Facility: CLINIC | Age: 56
End: 2021-11-02
Payer: COMMERCIAL

## 2021-11-02 VITALS
HEIGHT: 71 IN | SYSTOLIC BLOOD PRESSURE: 130 MMHG | DIASTOLIC BLOOD PRESSURE: 72 MMHG | BODY MASS INDEX: 35.98 KG/M2 | OXYGEN SATURATION: 98 % | WEIGHT: 257 LBS | HEART RATE: 79 BPM | TEMPERATURE: 98 F

## 2021-11-02 DIAGNOSIS — Z12.5 SCREENING FOR PROSTATE CANCER: ICD-10-CM

## 2021-11-02 DIAGNOSIS — Z00.00 ANNUAL PHYSICAL EXAM: Primary | ICD-10-CM

## 2021-11-02 DIAGNOSIS — E66.9 OBESITY (BMI 30-39.9): ICD-10-CM

## 2021-11-02 DIAGNOSIS — Z13.220 SCREENING FOR LIPID DISORDERS: ICD-10-CM

## 2021-11-02 DIAGNOSIS — N52.9 IMPOTENCE DUE TO ERECTILE DYSFUNCTION: ICD-10-CM

## 2021-11-02 DIAGNOSIS — Z12.11 SCREEN FOR COLON CANCER: ICD-10-CM

## 2021-11-02 DIAGNOSIS — I10 ESSENTIAL HYPERTENSION: ICD-10-CM

## 2021-11-02 DIAGNOSIS — F41.9 ANXIETY: ICD-10-CM

## 2021-11-02 PROBLEM — R00.2 PALPITATIONS: Status: RESOLVED | Noted: 2019-11-19 | Resolved: 2021-11-02

## 2021-11-02 PROBLEM — R42 DIZZINESS: Status: RESOLVED | Noted: 2019-02-17 | Resolved: 2021-11-02

## 2021-11-02 PROBLEM — S80.01XA CONTUSION OF RIGHT KNEE: Status: RESOLVED | Noted: 2021-10-13 | Resolved: 2021-11-02

## 2021-11-02 PROCEDURE — 99396 PREV VISIT EST AGE 40-64: CPT | Performed by: NURSE PRACTITIONER

## 2021-11-02 PROCEDURE — 3725F SCREEN DEPRESSION PERFORMED: CPT | Performed by: NURSE PRACTITIONER

## 2021-11-02 PROCEDURE — 3008F BODY MASS INDEX DOCD: CPT | Performed by: NURSE PRACTITIONER

## 2021-11-02 RX ORDER — LISINOPRIL 20 MG/1
20 TABLET ORAL DAILY
Qty: 90 TABLET | Refills: 3 | Status: SHIPPED | OUTPATIENT
Start: 2021-11-02 | End: 2022-11-02

## 2022-01-23 DIAGNOSIS — N52.9 IMPOTENCE DUE TO ERECTILE DYSFUNCTION: ICD-10-CM

## 2022-01-24 RX ORDER — SILDENAFIL 50 MG/1
TABLET, FILM COATED ORAL
Qty: 6 TABLET | Refills: 1 | Status: SHIPPED | OUTPATIENT
Start: 2022-01-24 | End: 2022-03-14

## 2022-02-15 LAB
ALBUMIN SERPL-MCNC: 4.2 G/DL (ref 3.6–5.1)
ALBUMIN/GLOB SERPL: 1.6 (CALC) (ref 1–2.5)
ALP SERPL-CCNC: 36 U/L (ref 35–144)
ALT SERPL-CCNC: 51 U/L (ref 9–46)
AST SERPL-CCNC: 32 U/L (ref 10–35)
BASOPHILS # BLD AUTO: 32 CELLS/UL (ref 0–200)
BASOPHILS NFR BLD AUTO: 0.7 %
BILIRUB SERPL-MCNC: 0.7 MG/DL (ref 0.2–1.2)
BUN SERPL-MCNC: 17 MG/DL (ref 7–25)
BUN/CREAT SERPL: ABNORMAL (CALC) (ref 6–22)
CALCIUM SERPL-MCNC: 9.7 MG/DL (ref 8.6–10.3)
CHLORIDE SERPL-SCNC: 104 MMOL/L (ref 98–110)
CHOLEST SERPL-MCNC: 187 MG/DL
CHOLEST/HDLC SERPL: 4 (CALC)
CO2 SERPL-SCNC: 28 MMOL/L (ref 20–32)
CREAT SERPL-MCNC: 0.93 MG/DL (ref 0.7–1.33)
EOSINOPHIL # BLD AUTO: 120 CELLS/UL (ref 15–500)
EOSINOPHIL NFR BLD AUTO: 2.6 %
ERYTHROCYTE [DISTWIDTH] IN BLOOD BY AUTOMATED COUNT: 12.7 % (ref 11–15)
GLOBULIN SER CALC-MCNC: 2.6 G/DL (CALC) (ref 1.9–3.7)
GLUCOSE SERPL-MCNC: 125 MG/DL (ref 65–99)
HCT VFR BLD AUTO: 40.5 % (ref 38.5–50)
HDLC SERPL-MCNC: 47 MG/DL
HGB BLD-MCNC: 14.1 G/DL (ref 13.2–17.1)
LDLC SERPL CALC-MCNC: 105 MG/DL (CALC)
LYMPHOCYTES # BLD AUTO: 1523 CELLS/UL (ref 850–3900)
LYMPHOCYTES NFR BLD AUTO: 33.1 %
MCH RBC QN AUTO: 31.3 PG (ref 27–33)
MCHC RBC AUTO-ENTMCNC: 34.8 G/DL (ref 32–36)
MCV RBC AUTO: 89.8 FL (ref 80–100)
MONOCYTES # BLD AUTO: 685 CELLS/UL (ref 200–950)
MONOCYTES NFR BLD AUTO: 14.9 %
NEUTROPHILS # BLD AUTO: 2240 CELLS/UL (ref 1500–7800)
NEUTROPHILS NFR BLD AUTO: 48.7 %
NONHDLC SERPL-MCNC: 140 MG/DL (CALC)
PLATELET # BLD AUTO: 187 THOUSAND/UL (ref 140–400)
PMV BLD REES-ECKER: 9.6 FL (ref 7.5–12.5)
POTASSIUM SERPL-SCNC: 4.5 MMOL/L (ref 3.5–5.3)
PROT SERPL-MCNC: 6.8 G/DL (ref 6.1–8.1)
PSA SERPL-MCNC: 1.14 NG/ML
RBC # BLD AUTO: 4.51 MILLION/UL (ref 4.2–5.8)
SL AMB EGFR AFRICAN AMERICAN: 107 ML/MIN/1.73M2
SL AMB EGFR NON AFRICAN AMERICAN: 92 ML/MIN/1.73M2
SODIUM SERPL-SCNC: 139 MMOL/L (ref 135–146)
TRIGL SERPL-MCNC: 231 MG/DL
TSH SERPL-ACNC: 1.53 MIU/L (ref 0.4–4.5)
WBC # BLD AUTO: 4.6 THOUSAND/UL (ref 3.8–10.8)

## 2022-03-12 DIAGNOSIS — N52.9 IMPOTENCE DUE TO ERECTILE DYSFUNCTION: ICD-10-CM

## 2022-03-14 RX ORDER — SILDENAFIL 50 MG/1
TABLET, FILM COATED ORAL
Qty: 6 TABLET | Refills: 1 | Status: SHIPPED | OUTPATIENT
Start: 2022-03-14 | End: 2022-06-02

## 2022-03-23 ENCOUNTER — TELEPHONE (OUTPATIENT)
Dept: GASTROENTEROLOGY | Facility: CLINIC | Age: 57
End: 2022-03-23

## 2022-03-23 NOTE — TELEPHONE ENCOUNTER
03/23/22  Screened by: Manuel Kirkland    Referring Provider Bill David    Pre- Screening: There is no height or weight on file to calculate BMI  Has patient been referred for a routine screening Colonoscopy? yes  Is the patient between 39-70 years old? yes      Previous Colonoscopy yes   If yes:    Date: [facility]:     Reason:       SCHEDULING STAFF: If the patient is between 45yrs-49yrs, please advise patient to confirm benefits/coverage with their insurance company for a routine screening colonoscopy, some insurance carriers will only cover at [address] or older  If the patient is over 66years old, please schedule an office visit  Does the patient want to see a Gastroenterologist prior to their procedure OR are they having any GI symptoms? no    Has the patient been hospitalized or had abdominal surgery in the past 6 months? no    Does the patient use supplemental oxygen? no    Does the patient take Coumadin, Lovenox, Plavix, Elliquis, Xarelto, or other blood thinning medication? no    Has the patient had a stroke, cardiac event, or stent placed in the past year? no     PT PASSED OA  PT'S WIFE JENNA  CAN BE REACHED [phone]  SCHEDULING STAFF: If patient answers NO to above questions, then schedule procedure  If patient answers YES to above questions, then schedule office appointment  If patient is between 45yrs - 49yrs, please advise patient that we will have to confirm benefits & coverage with their insurance company for a routine screening colonoscopy

## 2022-04-21 PROBLEM — K62.5 RECTAL BLEEDING: Status: ACTIVE | Noted: 2022-04-21

## 2022-04-29 ENCOUNTER — LAB REQUISITION (OUTPATIENT)
Dept: LAB | Facility: HOSPITAL | Age: 57
End: 2022-04-29
Payer: COMMERCIAL

## 2022-04-29 DIAGNOSIS — K57.30 DIVERTICULOSIS OF LARGE INTESTINE WITHOUT PERFORATION OR ABSCESS WITHOUT BLEEDING: ICD-10-CM

## 2022-04-29 DIAGNOSIS — K63.5 POLYP OF COLON: ICD-10-CM

## 2022-04-29 PROCEDURE — 88305 TISSUE EXAM BY PATHOLOGIST: CPT | Performed by: PATHOLOGY

## 2022-05-25 ENCOUNTER — NURSE TRIAGE (OUTPATIENT)
Dept: OTHER | Facility: OTHER | Age: 57
End: 2022-05-25

## 2022-05-26 NOTE — TELEPHONE ENCOUNTER
Regarding: strange blisters/cyst on palm of hands   ----- Message from Mark Rodriguez MA sent at 5/25/2022  9:00 PM EDT -----  "I am worried about these these blisters on his hands, in the palms, it looked like there was water underneath the calloused areas several days ago, almost like little cysts now it has been a few days and they are very hard and it looks like he has knuckles on the inside of his hands "

## 2022-05-26 NOTE — TELEPHONE ENCOUNTER
Patient states hes not coming to office   Wife will call the back of his insurance and hand a hand specialist on her own

## 2022-05-26 NOTE — TELEPHONE ENCOUNTER
Reason for Disposition   [1] Small swelling or lump AND [2] unexplained AND [3] present > 1 week    Answer Assessment - Initial Assessment Questions  1  APPEARANCE of SWELLING: "What does it look like?" (e g , lymph node, insect bite, mole)      Swelling and bumps on hands     2  SIZE: "How large is the swelling?" (inches, cm or compare to coins)      Pretty swollen    3  LOCATION: "Where is the swelling located?"     Both hands    4  ONSET: "When did the swelling start?"      Couple days    5  PAIN: "Is it painful?" If Yes, ask: "How much?"      Denies    6  ITCH: "Does it itch?" If Yes, ask: "How much?"      Denies    7  CAUSE: "What do you think caused the swelling?"      Unknown    8   OTHER SYMPTOMS: "Do you have any other symptoms?" (e g , fever)      Denies    Protocols used: SKIN LUMP OR LOCALIZED SWELLING-ADULT-

## 2022-06-02 DIAGNOSIS — N52.9 IMPOTENCE DUE TO ERECTILE DYSFUNCTION: ICD-10-CM

## 2022-06-02 RX ORDER — SILDENAFIL 50 MG/1
TABLET, FILM COATED ORAL
Qty: 6 TABLET | Refills: 1 | Status: SHIPPED | OUTPATIENT
Start: 2022-06-02

## 2022-07-09 ENCOUNTER — APPOINTMENT (OUTPATIENT)
Dept: RADIOLOGY | Facility: CLINIC | Age: 57
End: 2022-07-09
Payer: COMMERCIAL

## 2022-07-09 ENCOUNTER — OFFICE VISIT (OUTPATIENT)
Dept: URGENT CARE | Facility: CLINIC | Age: 57
End: 2022-07-09
Payer: COMMERCIAL

## 2022-07-09 VITALS
DIASTOLIC BLOOD PRESSURE: 82 MMHG | HEART RATE: 70 BPM | OXYGEN SATURATION: 96 % | SYSTOLIC BLOOD PRESSURE: 132 MMHG | RESPIRATION RATE: 18 BRPM

## 2022-07-09 DIAGNOSIS — M25.521 RIGHT ELBOW PAIN: Primary | ICD-10-CM

## 2022-07-09 DIAGNOSIS — M25.521 RIGHT ELBOW PAIN: ICD-10-CM

## 2022-07-09 PROCEDURE — 99213 OFFICE O/P EST LOW 20 MIN: CPT | Performed by: PHYSICIAN ASSISTANT

## 2022-07-09 PROCEDURE — 73080 X-RAY EXAM OF ELBOW: CPT

## 2022-07-09 RX ORDER — METHYLPREDNISOLONE 4 MG/1
TABLET ORAL
Qty: 21 TABLET | Refills: 0 | Status: SHIPPED | OUTPATIENT
Start: 2022-07-09

## 2022-07-09 NOTE — PATIENT INSTRUCTIONS
Rest, Ice, and Elevate limb  Continue to monitor symptoms  If symptoms do not improve in one week, follow up with orthopedics  Call Lorin Villanueva [phone] to schedule and appointment  If new or worsening symptoms occur, go immediately to the ER  Arthritis   WHAT YOU NEED TO KNOW:   Arthritis is pain or disease in one or more joints  There are many types of arthritis  Types such as rheumatoid arthritis cause inflammation in the joints  Other types wear away the cartilage between joints, such as osteoarthritis  This makes the bones of the joint rub together when you move the joint  An infection from bacteria, a virus, or a fungus can also cause arthritis  Your symptoms may be constant, or symptoms may come and go  Arthritis often gets worse over time and can cause permanent joint damage  DISCHARGE INSTRUCTIONS:   Call your doctor or rheumatologist if:   You have a fever and severe joint pain or swelling  You cannot move the affected joint  You have severe joint pain you cannot tolerate  You have a new or worsening rash  Your pain or swelling does not get better with treatment  You have questions or concerns about your condition or care  Medicines:   Acetaminophen  decreases pain and fever  It is available without a doctor's order  Ask how much to take and how often to take it  Follow directions  Read the labels of all other medicines you are using to see if they also contain acetaminophen, or ask your doctor or pharmacist  Acetaminophen can cause liver damage if not taken correctly  Do not use more than 4 grams (4,000 milligrams) total of acetaminophen in one day  NSAIDs , such as ibuprofen, help decrease swelling, pain, and fever  This medicine is available with or without a doctor's order  NSAIDs can cause stomach bleeding or kidney problems in certain people  If you take blood thinner medicine, always ask your healthcare provider if NSAIDs are safe for you   Always read the medicine label and follow directions  Steroids  reduce swelling and pain  Prescription pain medicine  may be given  Ask your healthcare provider how to take this medicine safely  Some prescription pain medicines contain acetaminophen  Do not take other medicines that contain acetaminophen without talking to your healthcare provider  Too much acetaminophen may cause liver damage  Prescription pain medicine may cause constipation  Ask your healthcare provider how to prevent or treat constipation  Take your medicine as directed  Contact your healthcare provider if you think your medicine is not helping or if you have side effects  Tell him of her if you are allergic to any medicine  Keep a list of the medicines, vitamins, and herbs you take  Include the amounts, and when and why you take them  Bring the list or the pill bottles to follow-up visits  Carry your medicine list with you in case of an emergency  Manage your symptoms:   Rest your painful joint so it can heal   Your healthcare provider may recommend crutches or a walker if the affected joint is in a leg  Apply ice or heat to the joint  Both can help decrease swelling and pain  Ice may also help prevent tissue damage  Use an ice pack, or put crushed ice in a plastic bag  Cover it with a towel and place it on your joint for 15 to 20 minutes every hour or as directed  You can apply heat for 20 minutes every 2 hours  Heat treatment includes hot packs or heat lamps  Elevate your joint  Elevation helps reduce swelling and pain  Raise your joint above the level of your heart as often as you can  Prop your painful joint on pillows to keep it above your heart comfortably  Manage arthritis:   Talk to your healthcare providers about your arthritis medicines  Some medicines may only be needed when you have arthritis pain  You may need to take other medicines every day to prevent arthritis from getting worse   Your healthcare providers will help you understand all your medicines and when to take them  It is important to take the medicines as directed, even if you start to feel better  You can continue to have joint damage and inflammation even if you do not feel it  Eat a variety of healthy foods  Healthy foods include fruits, vegetables, whole-grain breads, low-fat dairy products, beans, lean meats, and fish  Ask if you need to be on a special diet  A diet rich in calcium and vitamin D may decrease your risk of osteoporosis  Foods high in calcium include milk, cheese, broccoli, and tofu  Vitamin D may be found in meat, fish, fortified milk, cereal and bread  Ask if you need calcium or vitamin D supplements  Go to physical or occupational therapy as directed  A physical therapist can teach you exercises to improve flexibility and range of motion  You may also be shown non-weight-bearing exercises that are safe for your joints, such as swimming  Exercise can help keep your joints flexible and reduce pain  An occupational therapist can help you learn to do your daily activities when your joints are stiff or sore  Maintain a healthy weight  Extra weight puts increased pressure on your joints  Ask your healthcare provider what you should weigh  If you need to lose weight, he or she can help you create a weight loss program  Weight loss can help reduce pain and increase your ability to do your activities  Wear flat or low-heeled shoes  This will help decrease pain and reduce pressure on your ankle, knee, and hip joints  Do not smoke  Nicotine and other chemicals in cigarettes and cigars can damage your bones and joints  Ask your healthcare provider for information if you currently smoke and need help to quit  E-cigarettes or smokeless tobacco still contain nicotine  Talk to your healthcare provider before you use these products  Support devices:   Orthotic shoes or insoles  help support your feet when you walk      Crutches, a cane, or a walker  may help decrease your risk for falling  They also decrease stress on affected joints  Devices to prevent falls  include raised toilet seats and bathtub bars to help you get up from sitting  Handrails can be placed in areas where you need balance and support  Devices to help with support and rest  include splints to wear on your hands and a firm pillow while you sleep  Use a pillow that is firm enough to support your neck and head  Follow up with your healthcare provider or rheumatologist as directed:  Write down your questions so you remember to ask them during your visits  © Copyright [employer] 2022 Information is for End User's use only and may not be sold, redistributed or otherwise used for commercial purposes  All illustrations and images included in CareNotes® are the copyrighted property of [employer]  or Tremaine Schuster  The above information is an  only  It is not intended as medical advice for individual conditions or treatments  Talk to your doctor, nurse or pharmacist before following any medical regimen to see if it is safe and effective for you

## 2022-07-09 NOTE — PROGRESS NOTES
[facility]        NAME: Hector Pisano is a 65 y o  male  : 1965    MRN: [id number]  DATE: 2022  TIME: 12:51 PM    Assessment and Plan   Right elbow pain [M25 521]  1  Right elbow pain  XR elbow 3+ vw right    Ambulatory Referral to Orthopedic Surgery    methylPREDNISolone 4 MG tablet therapy pack     X-ray provider read, patient has significant degenerative changes as well as bone fragments  None of this appears acute  Will start patient on Medrol Dosepak and referred to Orthopedics  Patient states he understands and agrees to this plan  Patient Instructions     Continue to monitor symptoms  If new or worsening symptoms develop, go immediately to Er  Drink plenty of fluids  Follow up with Family Doctor this week  Chief Complaint     Chief Complaint   Patient presents with    Pain     Right elbow; years, but worse this week  Limited mobility and increased soreness          History of Present Illness       Patient has a past medical history of numerous fractures to his right elbow  Since then he has had recurrent right elbow pain  He gets layers up with swelling and pain that normally spontaneously resolved  For the past week he has had pain in this elbow  Atraumatic  Patient states that he has no increase in physical activity  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  He is not sure why the pain started now  About a month ago he had an episode of swelling to his right knee, he was worked up for Lyme disease and gout and it was negative for both of those  He was given a course of steroids which fully resolved the symptoms  Patient has not been doing anything to relieve the pain in this elbow  This feels similar to previous episodes  Review of Systems   Review of Systems   Constitutional: Negative  Negative for chills and fever  HENT: Negative  Eyes: Negative  Respiratory: Negative    Negative for chest tightness, shortness of breath and wheezing  Cardiovascular: Negative  Negative for chest pain and palpitations  Gastrointestinal: Negative  Endocrine: Negative  Genitourinary: Negative  Musculoskeletal: Positive for joint swelling  Negative for back pain, neck pain and neck stiffness  Skin: Negative  Negative for color change, rash and wound  Neurological: Negative for dizziness, weakness, light-headedness, numbness and headaches  Hematological: Negative  Psychiatric/Behavioral: Negative  Current Medications       Current Outpatient Medications:     lisinopril (ZESTRIL) 20 mg tablet, Take 1 tablet (20 mg total) by mouth daily, Disp: 90 tablet, Rfl: 3    methylPREDNISolone 4 MG tablet therapy pack, Use as directed on package, Disp: 21 tablet, Rfl: 0    metoprolol succinate (TOPROL-XL) 100 mg 24 hr tablet, Take 1 tablet (100 mg total) by mouth daily, Disp: 90 tablet, Rfl: 5    naproxen (NAPROSYN) 500 mg tablet, Take 1 tablet (500 mg total) by mouth 2 (two) times a day with meals, Disp: 30 tablet, Rfl: 0    sildenafil (VIAGRA) 50 MG tablet, TAKE 1 TABLET BY MOUTH DAILY AS NEEDED FOR ERECTILE DYSFUNCTION  , Disp: 6 tablet, Rfl: 1    Current Allergies     Allergies as of 07/09/2022    (No Known Allergies)            The following portions of the patient's history were reviewed and updated as appropriate: allergies, current medications, past family history, past medical history, past social history, past surgical history and problem list      Past Medical History:   Diagnosis Date    Hypertension     Inner ear dysfunction     Irreducible umbilical hernia     last assessed 21Oct2013    Pneumonia        Past Surgical History:   Procedure Laterality Date    COLONOSCOPY N/A 9/16/2016    Procedure: COLONOSCOPY;  Surgeon: Leo Barahona MD;  [facility]: [facility]; Service:     HERNIA REPAIR      umbilical    SHOULDER SURGERY Right        Family History   Adopted: Yes   Family history unknown:  Yes Medications have been verified  Objective   /82   Pulse 70   Resp 18   SpO2 96%        Physical Exam     Physical Exam  Vitals and nursing note reviewed  Constitutional:       General: He is not in acute distress  Appearance: Normal appearance  He is well-developed  He is not ill-appearing or diaphoretic  HENT:      Head: Normocephalic and atraumatic  Cardiovascular:      Rate and Rhythm: Normal rate and regular rhythm  Heart sounds: Normal heart sounds  Pulmonary:      Effort: Pulmonary effort is normal  No respiratory distress  Breath sounds: Normal breath sounds  No wheezing, rhonchi or rales  Musculoskeletal:      Right elbow: Swelling present  No deformity, effusion or lacerations  Decreased range of motion (Pain with full extension)  Tenderness present in medial epicondyle and lateral epicondyle  No olecranon process tenderness  Cervical back: Normal range of motion and neck supple  Comments: No warmth to R elbow or erythema  No bursitis noted   Lymphadenopathy:      Cervical: No cervical adenopathy  Skin:     General: Skin is warm  Capillary Refill: Capillary refill takes less than 2 seconds  Findings: No rash  Neurological:      Mental Status: He is alert

## 2022-08-09 ENCOUNTER — OFFICE VISIT (OUTPATIENT)
Dept: OBGYN CLINIC | Facility: MEDICAL CENTER | Age: 57
End: 2022-08-09
Payer: COMMERCIAL

## 2022-08-09 VITALS
HEART RATE: 66 BPM | HEIGHT: 71 IN | WEIGHT: 263 LBS | SYSTOLIC BLOOD PRESSURE: 151 MMHG | DIASTOLIC BLOOD PRESSURE: 83 MMHG | BODY MASS INDEX: 36.82 KG/M2

## 2022-08-09 DIAGNOSIS — M19.029 ELBOW ARTHRITIS: Primary | ICD-10-CM

## 2022-08-09 PROCEDURE — 99204 OFFICE O/P NEW MOD 45 MIN: CPT | Performed by: ORTHOPAEDIC SURGERY

## 2022-08-09 NOTE — LETTER
August 9, 2022     [facility] [address] [id number]    Patient: Afsaneh Webb   YOB: 1965   Date of Visit: 8/9/2022       Dear Dr Brenda De La Cruz: Thank you for referring Evon Schneider to me for evaluation  Below are my notes for this consultation  If you have questions, please do not hesitate to call me  I look forward to following your patient along with you  Sincerely,        Jhonatan Knowles        CC: No Recipients  Jhonatan Knowles  8/9/2022 10:43 AM  Signed  [facility] - C[facility]STIANE L [facility] CARE SPECIALISTS [place]  [facility] Maureen Bourgeois [address] [place] [id number]       Afsaneh Webb  [id number]  1965    ORTHOPAEDIC SURGERY OUTPATIENT NOTE  8/9/2022      HISTORY:  65 y o  male  presents with chronic right elbow pain and stiffness  Patient has significant fracture dislocation when he was in high school while wrestling  He had another injury later on that caused his elbow to dislocate when he was lot younger  Patient is a   He states recently he has noticed more frequent pain and inability to perform his activities  He localized pain inside his elbow joint  Today however he states the pain is not bad  He does admit to occasional mechanical symptoms when he will have pain and takes a while for the pain to subside with associated decreased range of motion  The patient is right-hand dominant  Past Medical History:   Diagnosis Date    Hypertension     Inner ear dysfunction     Irreducible umbilical hernia     last assessed 21Oct2013    Pneumonia        Past Surgical History:   Procedure Laterality Date    COLONOSCOPY N/A 9/16/2016    Procedure: COLONOSCOPY;  Surgeon: Ashley Corrales MD;  [facility];   Service:     HERNIA REPAIR      umbilical    SHOULDER SURGERY Right        Social History     Socioeconomic History    Marital status: /Civil Union     Spouse name: Not on file    Number of children: Not on file    Years of education: Not on file    Highest education level: Not on file   Occupational History    Not on file   Tobacco Use    Smoking status: Former Smoker     Packs/day: 1 00     Years: 15 00     Pack years: 15 00     Types: Cigarettes     Quit date: 0     Years since quittin 6    Smokeless tobacco: Never Used    Tobacco comment: former smoker, per [employer] Use    Vaping Use: Never used   Substance and Sexual Activity    Alcohol use: Not Currently     Comment: being a social drinker, per [employer] Drug use: Not Currently     Types: Marijuana    Sexual activity: Not on file   Other Topics Concern    Not on file   Social History Narrative    Not on file     Social Determinants of Health     Financial Resource Strain: Not on file   Food Insecurity: Not on file   Transportation Needs: Not on file   Physical Activity: Not on file   Stress: Not on file   Social Connections: Not on file   Intimate Partner Violence: Not on file   Housing Stability: Not on file       Family History   Adopted: Yes   Family history unknown: Yes        Patient's Medications   New Prescriptions    No medications on file   Previous Medications    LISINOPRIL (ZESTRIL) 20 MG TABLET    Take 1 tablet (20 mg total) by mouth daily    METHYLPREDNISOLONE 4 MG TABLET THERAPY PACK    Use as directed on package    METOPROLOL SUCCINATE (TOPROL-XL) 100 MG 24 HR TABLET    Take 1 tablet (100 mg total) by mouth daily    NAPROXEN (NAPROSYN) 500 MG TABLET    Take 1 tablet (500 mg total) by mouth 2 (two) times a day with meals    SILDENAFIL (VIAGRA) 50 MG TABLET    TAKE 1 TABLET BY MOUTH DAILY AS NEEDED FOR ERECTILE DYSFUNCTION  Modified Medications    No medications on file   Discontinued Medications    No medications on file       No Known Allergies     /83   Pulse 66   Ht 5' 11" (1 803 m)   Wt 119 kg (263 lb)   BMI 36 68 kg/m²      REVIEW OF SYSTEMS:  Constitutional: Negative  HEENT: Negative  Respiratory: Negative  Skin: Negative  Neurological: Negative  Psychiatric/Behavioral: Negative  Musculoskeletal: Negative except for that mentioned in the HPI     [unfilled]     PHYSICAL EXAM:  Right elbow:  132-25 degrees range of motion  5/5 strength normal range of motion  No tenderness palpation medial lateral epicondyle  No tenderness palpation radial head or olecranon  IMAGING:  X-ray right elbow: Severe ulnohumeral and radiocapitellar osteoarthritis with multiple loose bodies and significant osteophytes and heterotopic ossification  ASSESSMENT AND PLAN:  65 y o  male  right elbow mechanical symptoms secondary to osteoarthritis and loose bodies  Will obtain a CT scan with 3D reconstruction  Patient returned with the CT scan for further discussion of likely surgical intervention which could be either arthroscopic versus open  I do think likely an arthroscopic approach would be beneficial as I do think most of his symptoms are coming from loose bodies and he is not mainly complaining of decreased range of motion  However I did discuss that if we were to arthroscopic surgery we do loose body removal as well as an osteocapsular arthroplasty to further help his elbow

## 2022-08-09 NOTE — PROGRESS NOTES
[facility] - [facility]STIANE L K[facility] CARE SPECIALISTS [place]  Ron Bourgeois [address] [place] [id number]       Concha Greenwood  [id number]  1965    ORTHOPAEDIC SURGERY OUTPATIENT NOTE  2022      HISTORY:  65 y o  male  presents with chronic right elbow pain and stiffness  Patient has significant fracture dislocation when he was in high school while wrestling  He had another injury later on that caused his elbow to dislocate when he was lot younger  Patient is a   He states recently he has noticed more frequent pain and inability to perform his activities  He localized pain inside his elbow joint  Today however he states the pain is not bad  He does admit to occasional mechanical symptoms when he will have pain and takes a while for the pain to subside with associated decreased range of motion  The patient is right-hand dominant  Past Medical History:   Diagnosis Date    Hypertension     Inner ear dysfunction     Irreducible umbilical hernia     last assessed 2013    Pneumonia        Past Surgical History:   Procedure Laterality Date    COLONOSCOPY N/A 2016    Procedure: COLONOSCOPY;  Surgeon: Richard Hudson MD;  [facility];   Service:     HERNIA REPAIR      umbilical    SHOULDER SURGERY Right        Social History     Socioeconomic History    Marital status: /Civil Union     Spouse name: Not on file    Number of children: Not on file    Years of education: Not on file    Highest education level: Not on file   Occupational History    Not on file   Tobacco Use    Smoking status: Former Smoker     Packs/day: 1 00     Years: 15 00     Pack years: 15 00     Types: Cigarettes     Quit date:      Years since quittin 6    Smokeless tobacco: Never Used    Tobacco comment: former smoker, per [employer] Use    Vaping Use: Never used   Substance and Sexual Activity    Alcohol use: Not Currently     Comment: being a social drinker, per [employer]    Drug use: Not Currently     Types: Marijuana    Sexual activity: Not on file   Other Topics Concern    Not on file   Social History Narrative    Not on file     Social Determinants of Health     Financial Resource Strain: Not on file   Food Insecurity: Not on file   Transportation Needs: Not on file   Physical Activity: Not on file   Stress: Not on file   Social Connections: Not on file   Intimate Partner Violence: Not on file   Housing Stability: Not on file       Family History   Adopted: Yes   Family history unknown: Yes        Patient's Medications   New Prescriptions    No medications on file   Previous Medications    LISINOPRIL (ZESTRIL) 20 MG TABLET    Take 1 tablet (20 mg total) by mouth daily    METHYLPREDNISOLONE 4 MG TABLET THERAPY PACK    Use as directed on package    METOPROLOL SUCCINATE (TOPROL-XL) 100 MG 24 HR TABLET    Take 1 tablet (100 mg total) by mouth daily    NAPROXEN (NAPROSYN) 500 MG TABLET    Take 1 tablet (500 mg total) by mouth 2 (two) times a day with meals    SILDENAFIL (VIAGRA) 50 MG TABLET    TAKE 1 TABLET BY MOUTH DAILY AS NEEDED FOR ERECTILE DYSFUNCTION  Modified Medications    No medications on file   Discontinued Medications    No medications on file       No Known Allergies     /83   Pulse 66   Ht 5' 11" (1 803 m)   Wt 119 kg (263 lb)   BMI 36 68 kg/m²      REVIEW OF SYSTEMS:  Constitutional: Negative  HEENT: Negative  Respiratory: Negative  Skin: Negative  Neurological: Negative  Psychiatric/Behavioral: Negative  Musculoskeletal: Negative except for that mentioned in the HPI     [unfilled]     PHYSICAL EXAM:  Right elbow:  132-25 degrees range of motion  5/5 strength normal range of motion  No tenderness palpation medial lateral epicondyle  No tenderness palpation radial head or olecranon      IMAGING:  X-ray right elbow: Severe ulnohumeral and radiocapitellar osteoarthritis with multiple loose bodies and significant osteophytes and heterotopic ossification  ASSESSMENT AND PLAN:  65 y o  male  right elbow mechanical symptoms secondary to osteoarthritis and loose bodies  Will obtain a CT scan with 3D reconstruction  Patient returned with the CT scan for further discussion of likely surgical intervention which could be either arthroscopic versus open  I do think likely an arthroscopic approach would be beneficial as I do think most of his symptoms are coming from loose bodies and he is not mainly complaining of decreased range of motion  However I did discuss that if we were to arthroscopic surgery we do loose body removal as well as an osteocapsular arthroplasty to further help his elbow

## 2022-08-20 DIAGNOSIS — N52.9 IMPOTENCE DUE TO ERECTILE DYSFUNCTION: ICD-10-CM

## 2022-08-22 RX ORDER — SILDENAFIL 50 MG/1
TABLET, FILM COATED ORAL
Qty: 6 TABLET | Refills: 1 | Status: SHIPPED | OUTPATIENT
Start: 2022-08-22

## 2022-08-24 ENCOUNTER — TELEPHONE (OUTPATIENT)
Dept: OBGYN CLINIC | Facility: MEDICAL CENTER | Age: 57
End: 2022-08-24

## 2022-08-25 ENCOUNTER — TELEPHONE (OUTPATIENT)
Dept: OBGYN CLINIC | Facility: MEDICAL CENTER | Age: 57
End: 2022-08-25

## 2022-10-12 PROBLEM — Z12.11 SCREEN FOR COLON CANCER: Status: RESOLVED | Noted: 2021-11-02 | Resolved: 2022-10-12

## 2022-10-15 DIAGNOSIS — I10 ESSENTIAL HYPERTENSION: ICD-10-CM

## 2022-10-17 RX ORDER — LISINOPRIL 20 MG/1
TABLET ORAL
Qty: 90 TABLET | Refills: 3 | Status: SHIPPED | OUTPATIENT
Start: 2022-10-17 | End: 2022-10-19 | Stop reason: SDUPTHER

## 2022-10-19 DIAGNOSIS — I49.8 BIGEMINY: ICD-10-CM

## 2022-10-19 DIAGNOSIS — I10 ESSENTIAL HYPERTENSION: ICD-10-CM

## 2022-10-19 RX ORDER — METOPROLOL SUCCINATE 100 MG/1
100 TABLET, EXTENDED RELEASE ORAL DAILY
Qty: 90 TABLET | Refills: 5 | Status: SHIPPED | OUTPATIENT
Start: 2022-10-19

## 2022-10-19 RX ORDER — LISINOPRIL 20 MG/1
20 TABLET ORAL DAILY
Qty: 90 TABLET | Refills: 3 | Status: SHIPPED | OUTPATIENT
Start: 2022-10-19

## 2022-10-29 DIAGNOSIS — I10 ESSENTIAL HYPERTENSION: ICD-10-CM

## 2022-10-31 RX ORDER — LISINOPRIL 20 MG/1
TABLET ORAL
Qty: 90 TABLET | Refills: 3 | Status: SHIPPED | OUTPATIENT
Start: 2022-10-31 | End: 2022-11-03 | Stop reason: SDUPTHER

## 2022-11-03 ENCOUNTER — OFFICE VISIT (OUTPATIENT)
Dept: FAMILY MEDICINE CLINIC | Facility: CLINIC | Age: 57
End: 2022-11-03

## 2022-11-03 VITALS
SYSTOLIC BLOOD PRESSURE: 144 MMHG | HEIGHT: 71 IN | HEART RATE: 79 BPM | OXYGEN SATURATION: 97 % | DIASTOLIC BLOOD PRESSURE: 88 MMHG | WEIGHT: 262.6 LBS | BODY MASS INDEX: 36.76 KG/M2 | TEMPERATURE: 97.9 F

## 2022-11-03 DIAGNOSIS — Z00.00 ANNUAL PHYSICAL EXAM: Primary | ICD-10-CM

## 2022-11-03 DIAGNOSIS — Z12.5 SCREENING FOR PROSTATE CANCER: ICD-10-CM

## 2022-11-03 DIAGNOSIS — I49.8 BIGEMINY: ICD-10-CM

## 2022-11-03 DIAGNOSIS — N52.9 IMPOTENCE DUE TO ERECTILE DYSFUNCTION: ICD-10-CM

## 2022-11-03 DIAGNOSIS — I47.20 VENTRICULAR TACHYARRHYTHMIA: ICD-10-CM

## 2022-11-03 DIAGNOSIS — E66.9 OBESITY (BMI 30-39.9): ICD-10-CM

## 2022-11-03 DIAGNOSIS — I10 ESSENTIAL HYPERTENSION: ICD-10-CM

## 2022-11-03 PROBLEM — K62.5 RECTAL BLEEDING: Status: RESOLVED | Noted: 2022-04-21 | Resolved: 2022-11-03

## 2022-11-03 PROBLEM — F41.9 ANXIETY: Status: RESOLVED | Noted: 2019-04-26 | Resolved: 2022-11-03

## 2022-11-03 RX ORDER — METOPROLOL SUCCINATE 100 MG/1
100 TABLET, EXTENDED RELEASE ORAL DAILY
Qty: 90 TABLET | Refills: 5 | Status: SHIPPED | OUTPATIENT
Start: 2022-11-03

## 2022-11-03 RX ORDER — LISINOPRIL 20 MG/1
20 TABLET ORAL DAILY
Qty: 90 TABLET | Refills: 3 | Status: SHIPPED | OUTPATIENT
Start: 2022-11-03

## 2022-11-03 RX ORDER — SILDENAFIL 50 MG/1
50 TABLET, FILM COATED ORAL AS NEEDED
Qty: 6 TABLET | Refills: 1 | Status: SHIPPED | OUTPATIENT
Start: 2022-11-03 | End: 2023-02-01

## 2022-11-03 NOTE — PROGRESS NOTES
[address] [address] [place] [address]    NAME: Bridget Flores  AGE: 65 y o  SEX: male  : 1965     DATE: 11/3/2022     Assessment and Plan:     Problem List Items Addressed This Visit        Cardiovascular and Mediastinum    Essential hypertension     Blood pressure is well controlled on the Lisinopril 20 mg and Metoprolol 100 discussed weight loss and exercise          Relevant Medications    lisinopril (ZESTRIL) 20 mg tablet    metoprolol succinate (TOPROL-XL) 100 mg 24 hr tablet    Other Relevant Orders    Comprehensive metabolic panel    CBC and differential    Lipid Panel with Direct LDL reflex    Ventricular tachyarrhythmia     Patient has occasional PVCs managed with the Metoprolol          Relevant Medications    metoprolol succinate (TOPROL-XL) 100 mg 24 hr tablet    sildenafil (VIAGRA) 50 MG tablet    Other Relevant Orders    Comprehensive metabolic panel    CBC and differential    Lipid Panel with Direct LDL reflex       Other    Obesity (BMI 30-39  9)    Annual physical exam - Primary    Impotence due to erectile dysfunction     Viagra ordered and refilled and is effective          Relevant Medications    sildenafil (VIAGRA) 50 MG tablet    BMI 36 0-36 9,adult      Other Visit Diagnoses     Screening for prostate cancer        Relevant Orders    PSA, Total Screen    Bigeminy        Relevant Medications    metoprolol succinate (TOPROL-XL) 100 mg 24 hr tablet    sildenafil (VIAGRA) 50 MG tablet          Immunizations and preventive care screenings were discussed with patient today  Appropriate education was printed on patient's after visit summary  Discussed risks and benefits of prostate cancer screening  We discussed the controversial history of PSA screening for prostate cancer in the [place] as well as the risk of over detection and over treatment of prostate cancer by way of PSA screening    The patient understands that PSA blood testing is an imperfect way to screen for prostate cancer and that elevated PSA levels in the blood may also be caused by infection, inflammation, prostatic trauma or manipulation, urological procedures, or by benign prostatic enlargement  The role of the digital rectal examination in prostate cancer screening was also discussed and I discussed with him that there is large interobserver variability in the findings of digital rectal examination  Counseling:  Injury prevention: discussed safety/seat belts, safety helmets, smoke detectors, carbon dioxide detectors, and smoking near bedding or upholstery  Exercise: the importance of regular exercise/physical activity was discussed  Recommend exercise 3-5 times per week for at least 30 minutes  BMI Counseling: Body mass index is 36 63 kg/m²  The BMI is above normal  Nutrition recommendations include decreasing portion sizes, encouraging healthy choices of fruits and vegetables, decreasing fast food intake, consuming healthier snacks, limiting drinks that contain sugar, moderation in carbohydrate intake, increasing intake of lean protein, reducing intake of saturated and trans fat and reducing intake of cholesterol  Exercise recommendations include moderate physical activity 150 minutes/week  No pharmacotherapy was ordered  Patient referred to PCP  Rationale for BMI follow-up plan is due to patient being overweight or obese  Depression Screening and Follow-up Plan: Patient was screened for depression during today's encounter  They screened negative with a PHQ-2 score of 0  Return in 1 year (on 11/3/2023)  Chief Complaint:     Chief Complaint   Patient presents with   • Well Check      History of Present Illness:     Adult Annual Physical   Patient here for a comprehensive physical exam  The patient reports no problems    Having left lower back numbness and has at times present for a few months   Diet and Physical Activity  Diet/Nutrition: well balanced diet  Exercise: walking, strength training exercises and 5-7 times a week on average  Depression Screening  PHQ-2/9 Depression Screening    Little interest or pleasure in doing things: 0 - not at all  Feeling down, depressed, or hopeless: 0 - not at all  PHQ-2 Score: 0  PHQ-2 Interpretation: Negative depression screen       General Health  Sleep: sleeps well and gets 7-8 hours of sleep on average  Hearing: normal - bilateral   Vision: no vision problems, goes for regular eye exams and wears glasses  Dental: regular dental visits, brushes teeth twice daily and flosses teeth occasionally   Health  Symptoms include: erectile dysfunction     Review of Systems:     Review of Systems   Constitutional: Negative for activity change, appetite change, chills, diaphoresis, fatigue, fever and unexpected weight change  HENT: Positive for congestion  Negative for dental problem, drooling, ear discharge, ear pain, hearing loss, postnasal drip, sinus pressure, sinus pain, sneezing, sore throat, trouble swallowing and voice change  Eyes: Negative for pain, discharge, redness and visual disturbance  Respiratory: Negative for apnea, cough, choking, chest tightness, shortness of breath, wheezing and stridor  Cardiovascular: Negative for chest pain, palpitations and leg swelling  Gastrointestinal: Negative for abdominal distention, abdominal pain, blood in stool, diarrhea, nausea and vomiting  Endocrine: Negative  Genitourinary: Negative  Musculoskeletal: Negative for arthralgias  Numbness in left lower back   Skin: Negative  Allergic/Immunologic: Negative  Neurological: Negative for dizziness and light-headedness  Hematological: Negative  Psychiatric/Behavioral: Negative for behavioral problems, dysphoric mood and sleep disturbance        Past Medical History:     Past Medical History:   Diagnosis Date   • Hypertension    • Inner ear dysfunction    • Irreducible umbilical hernia     last assessed 2013   • Pneumonia       Past Surgical History:     Past Surgical History:   Procedure Laterality Date   • COLONOSCOPY N/A 2016    Procedure: COLONOSCOPY;  Surgeon: Zion Peña MD;  [facility];   Service:    • HERNIA REPAIR      umbilical   • SHOULDER SURGERY Right       Family History:     Family History   Adopted: Yes   Family history unknown: Yes      Social History:     Social History     Socioeconomic History   • Marital status: /Civil Union     Spouse name: Not on file   • Number of children: Not on file   • Years of education: Not on file   • Highest education level: Not on file   Occupational History   • Not on file   Tobacco Use   • Smoking status: Former Smoker     Packs/day: 1 00     Years: 15      Pack years: 15      Types: Cigarettes     Quit date:      Years since quittin 8   • Smokeless tobacco: Never Used   • Tobacco comment: former smoker, per [employer]   Vaping Use   • Vaping Use: Never used   Substance and Sexual Activity   • Alcohol use: Not Currently     Comment: being a social drinker, per [employer]   • Drug use: Not Currently     Types: Marijuana   • Sexual activity: Not on file   Other Topics Concern   • Not on file   Social History Narrative   • Not on file     Social Determinants of Health     Financial Resource Strain: Not on file   Food Insecurity: Not on file   Transportation Needs: Not on file   Physical Activity: Not on file   Stress: Not on file   Social Connections: Not on file   Intimate Partner Violence: Not on file   Housing Stability: Not on file      Current Medications:     Current Outpatient Medications   Medication Sig Dispense Refill   • lisinopril (ZESTRIL) 20 mg tablet Take 1 tablet (20 mg total) by mouth daily 90 tablet 3   • metoprolol succinate (TOPROL-XL) 100 mg 24 hr tablet Take 1 tablet (100 mg total) by mouth daily 90 tablet 5   • sildenafil (VIAGRA) 50 MG tablet Take 1 tablet (50 mg total) by mouth as needed for erectile dysfunction 6 tablet 1     No current facility-administered medications for this visit  Allergies:     No Known Allergies   Physical Exam:     /88 (BP Location: Right arm, Patient Position: Sitting)   Pulse 79   Temp 97 9 °F (36 6 °C) (Temporal)   Ht 5' 11" (1 803 m)   Wt 119 kg (262 lb 9 6 oz)   SpO2 97%   BMI 36 63 kg/m²     Physical Exam  Vitals and nursing note reviewed  Constitutional:       Appearance: He is well-developed  HENT:      Head: Normocephalic and atraumatic  Right Ear: Tympanic membrane normal       Left Ear: Tympanic membrane normal       Nose: Nose normal       Mouth/Throat:      Mouth: Mucous membranes are moist    Eyes:      Conjunctiva/sclera: Conjunctivae normal    Cardiovascular:      Rate and Rhythm: Normal rate and regular rhythm  Heart sounds: No murmur heard  Pulmonary:      Effort: Pulmonary effort is normal  No respiratory distress  Breath sounds: Normal breath sounds  Abdominal:      Palpations: Abdomen is soft  Tenderness: There is no abdominal tenderness  Musculoskeletal:         General: Normal range of motion  Cervical back: Neck supple  Skin:     General: Skin is warm and dry  Capillary Refill: Capillary refill takes less than 2 seconds  Neurological:      General: No focal deficit present  Mental Status: He is alert and oriented to person, place, and time     Psychiatric:         Mood and Affect: Mood normal           Sydell Corporal, [address]

## 2022-11-03 NOTE — PATIENT INSTRUCTIONS

## 2022-11-03 NOTE — ASSESSMENT & PLAN NOTE
Blood pressure is well controlled on the Lisinopril 20 mg and Metoprolol 100 discussed weight loss and exercise

## 2023-01-04 DIAGNOSIS — N52.9 IMPOTENCE DUE TO ERECTILE DYSFUNCTION: ICD-10-CM

## 2023-01-04 RX ORDER — SILDENAFIL 50 MG/1
TABLET, FILM COATED ORAL
Qty: 6 TABLET | Refills: 1 | Status: SHIPPED | OUTPATIENT
Start: 2023-01-04

## 2023-01-10 ENCOUNTER — TELEPHONE (OUTPATIENT)
Dept: OTHER | Facility: OTHER | Age: 58
End: 2023-01-10

## 2023-01-10 ENCOUNTER — OFFICE VISIT (OUTPATIENT)
Dept: FAMILY MEDICINE CLINIC | Facility: CLINIC | Age: 58
End: 2023-01-10

## 2023-01-10 VITALS
OXYGEN SATURATION: 96 % | TEMPERATURE: 96.9 F | HEART RATE: 82 BPM | BODY MASS INDEX: 37.24 KG/M2 | SYSTOLIC BLOOD PRESSURE: 134 MMHG | HEIGHT: 71 IN | WEIGHT: 266 LBS | DIASTOLIC BLOOD PRESSURE: 80 MMHG

## 2023-01-10 DIAGNOSIS — Z11.59 SCREENING FOR VIRAL DISEASE: ICD-10-CM

## 2023-01-10 DIAGNOSIS — J01.00 ACUTE NON-RECURRENT MAXILLARY SINUSITIS: Primary | ICD-10-CM

## 2023-01-10 LAB
SARS-COV-2 AG UPPER RESP QL IA: NEGATIVE
VALID CONTROL: NORMAL

## 2023-01-10 RX ORDER — AMOXICILLIN AND CLAVULANATE POTASSIUM 875; 125 MG/1; MG/1
1 TABLET, FILM COATED ORAL EVERY 12 HOURS SCHEDULED
Qty: 14 TABLET | Refills: 0 | Status: SHIPPED | OUTPATIENT
Start: 2023-01-10 | End: 2023-01-17

## 2023-01-10 NOTE — PROGRESS NOTES
Assessment/Plan:           Problem List Items Addressed This Visit        Respiratory    Acute non-recurrent maxillary sinusitis - Primary    Relevant Medications    amoxicillin-clavulanate (AUGMENTIN) 875-125 mg per tablet         Subjective:      Patient ID: Benita Robbins is a 65 y o  male  Patient here with complaints that he is having sinus symptoms and work up 2 days ago with feeling unwell and not improving and so far has tried taking OTC products and medications  Patient tested for COVID was negative no sick contacts       The following portions of the patient's history were reviewed and updated as appropriate:   He  has a past medical history of Hypertension, Inner ear dysfunction, Irreducible umbilical hernia, and Pneumonia  He   Patient Active Problem List    Diagnosis Date Noted   • Acute non-recurrent maxillary sinusitis 01/10/2023   • BMI 36 0-36 9,adult 11/03/2022   • Impotence due to erectile dysfunction 10/02/2020   • Ventricular tachyarrhythmia 02/18/2020   • Annual physical exam 08/03/2018   • Essential hypertension 06/04/2018   • Obesity (BMI 30-39 9) 06/04/2018     He  has a past surgical history that includes Shoulder surgery (Right); Hernia repair; and Colonoscopy (N/A, 9/16/2016)  His He was adopted  Family history is unknown by patient  He  reports that he quit smoking about 25 years ago  His smoking use included cigarettes  He has a 15 00 pack-year smoking history  He has never used smokeless tobacco  He reports that he does not currently use alcohol  He reports that he does not currently use drugs after having used the following drugs: Marijuana    Current Outpatient Medications   Medication Sig Dispense Refill   • amoxicillin-clavulanate (AUGMENTIN) 875-125 mg per tablet Take 1 tablet by mouth every 12 (twelve) hours for 7 days 14 tablet 0   • lisinopril (ZESTRIL) 20 mg tablet Take 1 tablet (20 mg total) by mouth daily 90 tablet 3   • metoprolol succinate (TOPROL-XL) 100 mg 24 hr tablet take 1 tablet by mouth once daily 90 tablet 3   • sildenafil (VIAGRA) 50 MG tablet take 1 tablet by mouth daily if needed for ERECTILE DYSFUNCTION 6 tablet 1     No current facility-administered medications for this visit  He has No Known Allergies       Review of Systems   Constitutional: Positive for fatigue  Negative for activity change, appetite change, chills, diaphoresis, fever and unexpected weight change  HENT: Positive for congestion, postnasal drip, rhinorrhea, sinus pressure and sinus pain  Negative for ear pain, hearing loss, sneezing and sore throat  Eyes: Negative for pain, redness and visual disturbance  Respiratory: Positive for cough  Negative for shortness of breath  Cardiovascular: Negative for chest pain and leg swelling  Gastrointestinal: Negative for abdominal pain, diarrhea, nausea and vomiting  Endocrine: Negative  Genitourinary: Negative  Musculoskeletal: Negative for arthralgias  Allergic/Immunologic: Negative  Neurological: Negative for dizziness and light-headedness  Psychiatric/Behavioral: Negative for behavioral problems and dysphoric mood  Objective:      /80   Pulse 82   Temp (!) 96 9 °F (36 1 °C)   Ht 5' 11" (1 803 m)   Wt 121 kg (266 lb)   SpO2 96%   BMI 37 10 kg/m²          Physical Exam  Vitals and nursing note reviewed  Constitutional:       General: He is not in acute distress  Appearance: He is well-developed  HENT:      Head: Normocephalic and atraumatic  Right Ear: Hearing and tympanic membrane normal       Left Ear: Hearing and tympanic membrane normal       Nose: Nasal deformity present  Right Sinus: Maxillary sinus tenderness present  Left Sinus: Maxillary sinus tenderness present  Mouth/Throat:      Lips: Pink  Mouth: Mucous membranes are moist    Eyes:      Pupils: Pupils are equal, round, and reactive to light  Neck:      Thyroid: No thyromegaly     Cardiovascular:      Rate and Rhythm: Normal rate and regular rhythm  Heart sounds: Normal heart sounds  No murmur heard  Pulmonary:      Effort: Pulmonary effort is normal  No respiratory distress  Breath sounds: Normal breath sounds  No wheezing  Abdominal:      General: Bowel sounds are normal       Palpations: Abdomen is soft  Musculoskeletal:         General: Normal range of motion  Cervical back: Normal range of motion  Right lower leg: No edema  Left lower leg: No edema  Skin:     General: Skin is warm and dry  Neurological:      Mental Status: He is alert and oriented to person, place, and time  Psychiatric:         Attention and Perception: Attention normal          Mood and Affect: Mood normal          Speech: Speech normal          Behavior: Behavior is cooperative

## 2023-01-18 NOTE — PROGRESS NOTES
Assessment/Plan:    No problem-specific Assessment & Plan notes found for this encounter  Diagnoses and all orders for this visit:    Essential hypertension  Comments: Well controlled continue with current medications   Orders:  -     Comprehensive metabolic panel; Future  -     CBC and differential; Future  -     Lipid Panel with Direct LDL reflex; Future    Obesity (BMI 30-39  9)  Comments:  losing weight lost 10 pounds     Encounter for hepatitis C screening test for low risk patient  Comments:  will check for hep c  Orders:  -     Hepatitis C antibody; Future    Screening for prostate cancer  Comments:  will check psa  Orders:  -     PSA, Total Screen; Future    Encounter for screening for lung cancer  Comments:  20 year 1 ppd history   Orders:  -     CT lung screening program; Future          Subjective:      Patient ID: Bette Mcgee is a 47 y o  male  Patient here today for his blood pressure check and reports that he stopped drinking about 5 months now from alcohol and has noticed changes to his lifestyle  Patient not having complaints with his stomach  Patient reports that he is eating nuts and cutting ou tthe nuts has helped with his bowels  Patient has a history of having BPPV and reports that he was having dizziness the episodes have gone away and does wearing reading glasses but not regular glasses  Hypertension   Associated symptoms include headaches  The following portions of the patient's history were reviewed and updated as appropriate: He  has a past medical history of Hypertension; Inner ear dysfunction; Irreducible umbilical hernia; and Pneumonia  He   Patient Active Problem List    Diagnosis Date Noted    Encounter for screening for lung cancer 08/03/2018    Essential hypertension 06/04/2018    Obesity (BMI 30-39 9) 06/04/2018    Screening for prostate cancer 06/04/2018     He  has a past surgical history that includes Shoulder surgery;  Hernia repair; and Colonoscopy (N/A, 9/16/2016)  His family history is not on file  He  reports that he has quit smoking  He has never used smokeless tobacco  He reports that he does not drink alcohol or use drugs  He has No Known Allergies       Review of Systems   Constitutional: Negative  HENT: Positive for tinnitus  Eyes:        Last eye exam was >2 years ago    Respiratory: Negative  Smoking history 1 pack a day 20 years    Cardiovascular: Negative  Gastrointestinal:        Had colonoscopy due in 3 years    Endocrine: Positive for polydipsia  Genitourinary: Negative  Musculoskeletal: Negative  Skin: Negative  Allergic/Immunologic: Negative  Neurological: Positive for headaches  Hematological: Negative  Psychiatric/Behavioral: Negative  Objective:      /84 (Cuff Size: Standard)   Pulse 77   Temp 97 9 °F (36 6 °C) (Tympanic)   Ht 5' 11" (1 803 m)   Wt 100 kg (221 lb)   SpO2 98%   BMI 30 82 kg/m²          Physical Exam   Constitutional: He is oriented to person, place, and time  Vital signs are normal  He appears well-developed and well-nourished  No distress  HENT:   Head: Normocephalic and atraumatic  Eyes: Pupils are equal, round, and reactive to light  Neck: Normal range of motion  No thyromegaly present  Cardiovascular: Normal rate, regular rhythm, normal heart sounds and intact distal pulses  No murmur heard  Pulmonary/Chest: Effort normal and breath sounds normal  No respiratory distress  He has no wheezes  Abdominal: Soft  Bowel sounds are normal    Musculoskeletal: Normal range of motion  Neurological: He is alert and oriented to person, place, and time  Skin: Skin is warm and dry  Psychiatric: He has a normal mood and affect  Nursing note and vitals reviewed  (E4) spontaneous

## 2023-03-11 PROBLEM — J01.00 ACUTE NON-RECURRENT MAXILLARY SINUSITIS: Status: RESOLVED | Noted: 2023-01-10 | Resolved: 2023-03-11

## 2023-03-13 DIAGNOSIS — N52.9 IMPOTENCE DUE TO ERECTILE DYSFUNCTION: ICD-10-CM

## 2023-03-13 RX ORDER — SILDENAFIL 50 MG/1
TABLET, FILM COATED ORAL
Qty: 6 TABLET | Refills: 1 | Status: SHIPPED | OUTPATIENT
Start: 2023-03-13

## 2023-03-28 ENCOUNTER — OFFICE VISIT (OUTPATIENT)
Dept: CARDIOLOGY CLINIC | Facility: CLINIC | Age: 58
End: 2023-03-28

## 2023-03-28 VITALS
BODY MASS INDEX: 37.1 KG/M2 | HEART RATE: 60 BPM | WEIGHT: 265 LBS | DIASTOLIC BLOOD PRESSURE: 100 MMHG | SYSTOLIC BLOOD PRESSURE: 146 MMHG | HEIGHT: 71 IN

## 2023-03-28 DIAGNOSIS — I47.20 VENTRICULAR TACHYARRHYTHMIA (HCC): Primary | ICD-10-CM

## 2023-03-28 DIAGNOSIS — I10 ESSENTIAL HYPERTENSION: ICD-10-CM

## 2023-03-28 DIAGNOSIS — E66.9 OBESITY (BMI 30-39.9): ICD-10-CM

## 2023-03-28 RX ORDER — LOSARTAN POTASSIUM AND HYDROCHLOROTHIAZIDE 12.5; 5 MG/1; MG/1
1 TABLET ORAL DAILY
Qty: 90 TABLET | Refills: 5 | Status: SHIPPED | OUTPATIENT
Start: 2023-03-28

## 2023-03-28 NOTE — PROGRESS NOTES
Patient ID: Lucinda Sample is a 65 y o  male  Plan:      Essential hypertension  Inadequately controlled  We talked about modest weight loss, salt avoidance  As well as there is a chronic mild cough we will switch him to losartan with a small dose of hydrochlorothiazide  He is going to take his blood pressure at home and if there is persistent high blood pressure after the switch it may be worth adding  Amlodipine  Ventricular tachyarrhythmia ([address] )  Mainly quiescent on metoprolol  Obesity (BMI 30-39  9)  Carb avoidance emphasized  Follow up Plan/Other summary comments:  Return in about 18 months (around 9/28/2024)  HPI: Patient is seen in follow-up today regarding the above  He continues to have periodic sense of palpitations but no longer gets worried about this  Certainly no syncope or near syncope  Patient is now retired and trying to work out more  Again he came to see us because of palpitations and was found to have lots of ectopy as well as hypertension  He has come to learn of his anxiety contributing to some of these symptoms and this cyclic nature of that anxiety  Palpitations are quite limited since he started taking beta-blocker regularly for frequent PVCs  Results for orders placed or performed in visit on 03/28/23   POCT ECG    Impression    NSR  WNL  Most recent or relevant cardiac/vascular testing:    Stress echo 11/25/2019: Within normal limits  Past Surgical History:   Procedure Laterality Date   • COLONOSCOPY N/A 9/16/2016    Procedure: COLONOSCOPY;  Surgeon: Jayna Hanson MD;  Location: [facility]; Service:    • HERNIA REPAIR      umbilical   • SHOULDER SURGERY Right        Lipid Profile:   Lab Results   Component Value Date    TRIG 231 (H) 02/15/2022    HDL 47 02/15/2022         Review of Systems   10  point ROS  was otherwise non pertinent or negative except as per HPI or as below     Gait: Normal          Objective:     /100   Pulse "60   Ht 5' 11\" (1 803 m)   Wt 120 kg (265 lb)   BMI 36 96 kg/m²     PHYSICAL EXAM:    General:  Normal appearance in no distress  Eyes:  Anicteric  Oral mucosa:  Moist   Neck:  No JVD  Carotid upstrokes are brisk without bruits  No masses  Chest:  Clear to auscultation  Cardiac:  No palpable PMI  Normal S1 and S2  No murmur gallop or rub  Abdomen:  Soft and nontender  No palpable organomegaly or aortic enlargement  Extremities:  No peripheral edema  Musculoskeletal:  Symmetric  Vascular:  Femoral pulses are brisk without bruits  Popliteal pulses are intact bilaterally  Pedal pulses are intact  Neuro:  Grossly symmetric  Psych:  Alert and oriented x3          Current Outpatient Medications:   •  losartan-hydrochlorothiazide (HYZAAR) 50-12 5 mg per tablet, Take 1 tablet by mouth daily, Disp: 90 tablet, Rfl: 5  •  metoprolol succinate (TOPROL-XL) 100 mg 24 hr tablet, take 1 tablet by mouth once daily, Disp: 90 tablet, Rfl: 3  •  sildenafil (VIAGRA) 50 MG tablet, take 1 tablet by mouth daily if needed for ERECTILE DYSFUNCTION, Disp: 6 tablet, Rfl: 1  No Known Allergies  Past Medical History:   Diagnosis Date   • Hypertension    • Inner ear dysfunction    • Irreducible umbilical hernia     last assessed 2013   • Pneumonia            Social History     Tobacco Use   Smoking Status Former   • Packs/day:    • Years: 15 00   • Pack years: 15 00   • Types: Cigarettes   • Quit date:    • Years since quittin 2   Smokeless Tobacco Never   Tobacco Comments    former smoker, per [employer]             "

## 2023-03-28 NOTE — PATIENT INSTRUCTIONS
Try to cut back on carbs and salt if he can   5 to 10 pounds weight loss might do the trick  Guidelines are cutting back on bread, pasta, and beer  Stop the lisinopril  Start the losartan which has 50 mg and it plus a very tiny amount of water pill  Take your blood pressure 2-3 times per week in a calm setting perhaps an hour or so after you have taken meds  If the blood pressure top number is consistently over 135 after a few weeks then either contact me or your primary care provider about adding medication and I would suggest amlodipine 5 mg once daily

## 2023-03-28 NOTE — ASSESSMENT & PLAN NOTE
Inadequately controlled  We talked about modest weight loss, salt avoidance  As well as there is a chronic mild cough we will switch him to losartan with a small dose of hydrochlorothiazide  He is going to take his blood pressure at home and if there is persistent high blood pressure after the switch it may be worth adding  Amlodipine

## 2023-04-26 ENCOUNTER — APPOINTMENT (OUTPATIENT)
Dept: LAB | Facility: CLINIC | Age: 58
End: 2023-04-26

## 2023-04-26 DIAGNOSIS — I47.20 VENTRICULAR TACHYARRHYTHMIA (HCC): ICD-10-CM

## 2023-04-26 DIAGNOSIS — Z12.5 SCREENING FOR PROSTATE CANCER: ICD-10-CM

## 2023-04-26 DIAGNOSIS — I10 ESSENTIAL HYPERTENSION: ICD-10-CM

## 2023-04-26 LAB
ALBUMIN SERPL BCP-MCNC: 3.8 G/DL (ref 3.5–5)
ALP SERPL-CCNC: 32 U/L (ref 46–116)
ALT SERPL W P-5'-P-CCNC: 63 U/L (ref 12–78)
ANION GAP SERPL CALCULATED.3IONS-SCNC: 5 MMOL/L (ref 4–13)
AST SERPL W P-5'-P-CCNC: 41 U/L (ref 5–45)
BASOPHILS # BLD AUTO: 0.03 THOUSANDS/ΜL (ref 0–0.1)
BASOPHILS NFR BLD AUTO: 1 % (ref 0–1)
BILIRUB SERPL-MCNC: 0.97 MG/DL (ref 0.2–1)
BUN SERPL-MCNC: 14 MG/DL (ref 5–25)
CALCIUM SERPL-MCNC: 9.4 MG/DL (ref 8.3–10.1)
CHLORIDE SERPL-SCNC: 107 MMOL/L (ref 96–108)
CHOLEST SERPL-MCNC: 201 MG/DL
CO2 SERPL-SCNC: 26 MMOL/L (ref 21–32)
CREAT SERPL-MCNC: 1 MG/DL (ref 0.6–1.3)
EOSINOPHIL # BLD AUTO: 0.12 THOUSAND/ΜL (ref 0–0.61)
EOSINOPHIL NFR BLD AUTO: 2 % (ref 0–6)
ERYTHROCYTE [DISTWIDTH] IN BLOOD BY AUTOMATED COUNT: 12.8 % (ref 11.6–15.1)
GFR SERPL CREATININE-BSD FRML MDRD: 83 ML/MIN/1.73SQ M
GLUCOSE P FAST SERPL-MCNC: 153 MG/DL (ref 65–99)
HCT VFR BLD AUTO: 41.9 % (ref 36.5–49.3)
HDLC SERPL-MCNC: 50 MG/DL
HGB BLD-MCNC: 14.8 G/DL (ref 12–17)
IMM GRANULOCYTES # BLD AUTO: 0.02 THOUSAND/UL (ref 0–0.2)
IMM GRANULOCYTES NFR BLD AUTO: 0 % (ref 0–2)
LDLC SERPL CALC-MCNC: 84 MG/DL (ref 0–100)
LYMPHOCYTES # BLD AUTO: 1.57 THOUSANDS/ΜL (ref 0.6–4.47)
LYMPHOCYTES NFR BLD AUTO: 30 % (ref 14–44)
MCH RBC QN AUTO: 32.7 PG (ref 26.8–34.3)
MCHC RBC AUTO-ENTMCNC: 35.3 G/DL (ref 31.4–37.4)
MCV RBC AUTO: 93 FL (ref 82–98)
MONOCYTES # BLD AUTO: 0.77 THOUSAND/ΜL (ref 0.17–1.22)
MONOCYTES NFR BLD AUTO: 15 % (ref 4–12)
NEUTROPHILS # BLD AUTO: 2.65 THOUSANDS/ΜL (ref 1.85–7.62)
NEUTS SEG NFR BLD AUTO: 52 % (ref 43–75)
NRBC BLD AUTO-RTO: 0 /100 WBCS
PLATELET # BLD AUTO: 171 THOUSANDS/UL (ref 149–390)
PMV BLD AUTO: 9.4 FL (ref 8.9–12.7)
POTASSIUM SERPL-SCNC: 4 MMOL/L (ref 3.5–5.3)
PROT SERPL-MCNC: 7.2 G/DL (ref 6.4–8.4)
PSA SERPL-MCNC: 1.3 NG/ML (ref 0–4)
RBC # BLD AUTO: 4.52 MILLION/UL (ref 3.88–5.62)
SODIUM SERPL-SCNC: 138 MMOL/L (ref 135–147)
TRIGL SERPL-MCNC: 337 MG/DL
WBC # BLD AUTO: 5.16 THOUSAND/UL (ref 4.31–10.16)

## 2023-05-12 ENCOUNTER — OFFICE VISIT (OUTPATIENT)
Dept: FAMILY MEDICINE CLINIC | Facility: CLINIC | Age: 58
End: 2023-05-12

## 2023-05-12 VITALS
DIASTOLIC BLOOD PRESSURE: 86 MMHG | SYSTOLIC BLOOD PRESSURE: 132 MMHG | WEIGHT: 269.3 LBS | OXYGEN SATURATION: 97 % | HEART RATE: 85 BPM | BODY MASS INDEX: 37.7 KG/M2 | HEIGHT: 71 IN | TEMPERATURE: 97.8 F

## 2023-05-12 DIAGNOSIS — R73.09 INCREASED BLOOD GLUCOSE: Primary | ICD-10-CM

## 2023-05-12 DIAGNOSIS — I10 ESSENTIAL HYPERTENSION: ICD-10-CM

## 2023-05-12 DIAGNOSIS — N52.9 IMPOTENCE DUE TO ERECTILE DYSFUNCTION: ICD-10-CM

## 2023-05-12 PROBLEM — Z00.00 ANNUAL PHYSICAL EXAM: Status: RESOLVED | Noted: 2018-08-03 | Resolved: 2023-05-12

## 2023-05-12 PROBLEM — R73.9 INCREASED BLOOD GLUCOSE: Status: ACTIVE | Noted: 2023-05-12

## 2023-05-12 LAB — SL AMB POCT HEMOGLOBIN AIC: 6.1 (ref ?–6.5)

## 2023-05-12 RX ORDER — SILDENAFIL 50 MG/1
TABLET, FILM COATED ORAL
Qty: 6 TABLET | Refills: 1 | Status: SHIPPED | OUTPATIENT
Start: 2023-05-12 | End: 2023-05-16 | Stop reason: SDUPTHER

## 2023-05-12 NOTE — PROGRESS NOTES
Name: Amol Odonnell      : 1965      MRN: [id number]  Encounter Provider: SHAWNEE Saleem  Encounter Date: 2023   Encounter department: [address] [place] [id number]     1  Increased blood glucose  Assessment & Plan:  Patient HA1C 6 1% advised weight loss and diet     Orders:  -     POCT hemoglobin A1c  -     HEMOGLOBIN A1C W/ EAG ESTIMATION; Future  -     Comprehensive metabolic panel; Future    2  Essential hypertension  Assessment & Plan:  Patient is taking the Hyzaar and Metoprolol and well controlled       3  BMI 36 0-36 9,adult  Assessment & Plan:  Advised weight loss and exercise         BMI Counseling: Body mass index is 37 56 kg/m²  The BMI is above normal  Nutrition recommendations include decreasing portion sizes, encouraging healthy choices of fruits and vegetables, decreasing fast food intake, consuming healthier snacks, limiting drinks that contain sugar and moderation in carbohydrate intake  Exercise recommendations include moderate physical activity 150 minutes/week  No pharmacotherapy was ordered  Patient referred to PCP  Rationale for BMI follow-up plan is due to patient being overweight or obese  Subjective      Patient here today and had his labs completed and here to review the results and has no current issues to report  Review of Systems   Constitutional: Negative for activity change, appetite change, chills, diaphoresis, fatigue, fever and unexpected weight change  HENT: Negative for congestion, ear pain, hearing loss, postnasal drip, sinus pressure, sinus pain, sneezing and sore throat  Eyes: Negative for pain, redness and visual disturbance  Respiratory: Negative for cough and shortness of breath  Cardiovascular: Negative for chest pain and leg swelling  Gastrointestinal: Negative for abdominal pain, diarrhea, nausea and vomiting  Endocrine: Negative  Genitourinary: Negative      Musculoskeletal: Negative for "arthralgias  Allergic/Immunologic: Negative  Neurological: Negative for dizziness and light-headedness  Hematological: Negative  Psychiatric/Behavioral: Negative for behavioral problems and dysphoric mood  Current Outpatient Medications on File Prior to Visit   Medication Sig   • losartan-hydrochlorothiazide (HYZAAR) 50-12 5 mg per tablet Take 1 tablet by mouth daily   • metoprolol succinate (TOPROL-XL) 100 mg 24 hr tablet take 1 tablet by mouth once daily   • sildenafil (VIAGRA) 50 MG tablet take 1 tablet by mouth daily if needed for ERECTILE DYSFUNCTION       Objective     /86 (BP Location: Left arm, Patient Position: Sitting)   Pulse 85   Temp 97 8 °F (36 6 °C) (Temporal)   Ht 5' 11\" (1 803 m)   Wt 122 kg (269 lb 4 8 oz)   SpO2 97%   BMI 37 56 kg/m²     Physical Exam  Vitals and nursing note reviewed  Constitutional:       General: He is not in acute distress  Appearance: He is well-developed  HENT:      Head: Normocephalic and atraumatic  Right Ear: Tympanic membrane normal       Left Ear: Tympanic membrane normal       Nose: Nose normal       Mouth/Throat:      Mouth: Mucous membranes are moist    Eyes:      Pupils: Pupils are equal, round, and reactive to light  Neck:      Thyroid: No thyromegaly  Cardiovascular:      Rate and Rhythm: Normal rate and regular rhythm  Heart sounds: Normal heart sounds  No murmur heard  Pulmonary:      Effort: Pulmonary effort is normal  No respiratory distress  Breath sounds: Normal breath sounds  No wheezing  Abdominal:      General: Bowel sounds are normal       Palpations: Abdomen is soft  Musculoskeletal:         General: Normal range of motion  Cervical back: Normal range of motion  Skin:     General: Skin is warm and dry  Neurological:      General: No focal deficit present  Mental Status: He is alert and oriented to person, place, and time     Psychiatric:         Mood and Affect: Mood normal  " DIO DunlapNP

## 2023-05-16 DIAGNOSIS — N52.9 IMPOTENCE DUE TO ERECTILE DYSFUNCTION: ICD-10-CM

## 2023-05-16 RX ORDER — SILDENAFIL 50 MG/1
50 TABLET, FILM COATED ORAL AS NEEDED
Qty: 30 TABLET | Refills: 1 | Status: SHIPPED | OUTPATIENT
Start: 2023-05-16

## 2023-11-17 ENCOUNTER — OFFICE VISIT (OUTPATIENT)
Dept: FAMILY MEDICINE CLINIC | Facility: CLINIC | Age: 58
End: 2023-11-17
Payer: COMMERCIAL

## 2023-11-17 VITALS
HEIGHT: 71 IN | WEIGHT: 260.6 LBS | BODY MASS INDEX: 36.48 KG/M2 | HEART RATE: 78 BPM | DIASTOLIC BLOOD PRESSURE: 92 MMHG | SYSTOLIC BLOOD PRESSURE: 160 MMHG | TEMPERATURE: 97.8 F | OXYGEN SATURATION: 97 %

## 2023-11-17 DIAGNOSIS — Z00.00 ANNUAL PHYSICAL EXAM: Primary | ICD-10-CM

## 2023-11-17 DIAGNOSIS — R73.09 INCREASED BLOOD GLUCOSE: ICD-10-CM

## 2023-11-17 DIAGNOSIS — I10 ESSENTIAL HYPERTENSION: ICD-10-CM

## 2023-11-17 PROBLEM — E66.9 OBESITY (BMI 30-39.9): Status: RESOLVED | Noted: 2018-06-04 | Resolved: 2023-11-17

## 2023-11-17 PROCEDURE — 99396 PREV VISIT EST AGE 40-64: CPT | Performed by: NURSE PRACTITIONER

## 2023-11-17 RX ORDER — AMLODIPINE BESYLATE 5 MG/1
5 TABLET ORAL DAILY
Qty: 30 TABLET | Refills: 5 | Status: SHIPPED | OUTPATIENT
Start: 2023-11-17

## 2023-11-17 NOTE — PROGRESS NOTES
[address]    NAME: Henry Page  AGE: 63 y.o. SEX: male  : 1965     DATE: 2023     Assessment and Plan:     Problem List Items Addressed This Visit        Cardiovascular and Mediastinum    Essential hypertension     Ordered amlodipine 5 mg to add on his uncontrolled HTN         Relevant Medications    amLODIPine (NORVASC) 5 mg tablet    Other Relevant Orders    Comprehensive metabolic panel    Lipid Panel with Direct LDL reflex    CBC and differential       Other    BMI 36.0-36.9,adult    Increased blood glucose    Relevant Orders    HEMOGLOBIN A1C W/ EAG ESTIMATION    Annual physical exam - Primary       Immunizations and preventive care screenings were discussed with patient today. Appropriate education was printed on patient's after visit summary. Discussed risks and benefits of prostate cancer screening. We discussed the controversial history of PSA screening for prostate cancer in the [facility] as well as the risk of over detection and over treatment of prostate cancer by way of PSA screening. The patient understands that PSA blood testing is an imperfect way to screen for prostate cancer and that elevated PSA levels in the blood may also be caused by infection, inflammation, prostatic trauma or manipulation, urological procedures, or by benign prostatic enlargement. The role of the digital rectal examination in prostate cancer screening was also discussed and I discussed with him that there is large interobserver variability in the findings of digital rectal examination. Counseling:  Dental Health: discussed importance of regular tooth brushing, flossing, and dental visits. Exercise: the importance of regular exercise/physical activity was discussed. Recommend exercise 3-5 times per week for at least 30 minutes.        Depression Screening and Follow-up Plan: Patient was screened for depression during today's encounter. They screened negative with a PHQ-2 score of 0. No follow-ups on file. Chief Complaint:     Chief Complaint   Patient presents with   • Follow-up      History of Present Illness:     Adult Annual Physical   Patient here for a comprehensive physical exam. The patient reports no problems. Diet and Physical Activity  Diet/Nutrition: well balanced diet. Exercise: walking. Depression Screening  PHQ-2/9 Depression Screening    Little interest or pleasure in doing things: 0 - not at all  Feeling down, depressed, or hopeless: 0 - not at all  PHQ-2 Score: 0  PHQ-2 Interpretation: Negative depression screen       General Health  Sleep: sleeps well. Hearing: normal - bilateral.  Vision: no vision problems and goes for regular eye exams. Dental: regular dental visits.  Health  Symptoms include: none    Advanced Care Planning  Do you have an advanced directive? no  Do you have a durable medical power of ? no     Review of Systems:     Review of Systems   Constitutional:  Negative for activity change, appetite change, chills, diaphoresis, fatigue, fever and unexpected weight change. HENT:  Negative for congestion, ear pain, hearing loss, postnasal drip, sinus pressure, sinus pain, sneezing and sore throat. Eyes:  Negative for pain, redness and visual disturbance. Respiratory:  Negative for cough and shortness of breath. Cardiovascular:  Negative for chest pain and leg swelling. Gastrointestinal:  Negative for abdominal pain, diarrhea, nausea and vomiting. Endocrine: Negative. Genitourinary: Negative. Musculoskeletal:  Negative for arthralgias. Allergic/Immunologic: Negative. Neurological:  Negative for dizziness and light-headedness. Hematological: Negative. Psychiatric/Behavioral:  Negative for behavioral problems and dysphoric mood.        Past Medical History:     Past Medical History:   Diagnosis Date   • Hypertension    • Inner ear dysfunction    • Irreducible umbilical hernia     last assessed 2013   • Pneumonia       Past Surgical History:     Past Surgical History:   Procedure Laterality Date   • COLONOSCOPY N/A 2016    Procedure: COLONOSCOPY;  Surgeon: Leah Miller MD;  Location: [facility];   Service:    • HERNIA REPAIR      umbilical   • SHOULDER SURGERY Right       Family History:     Family History   Adopted: Yes   Family history unknown: Yes      Social History:     Social History     Socioeconomic History   • Marital status: /Civil Union     Spouse name: None   • Number of children: None   • Years of education: None   • Highest education level: None   Occupational History   • None   Tobacco Use   • Smoking status: Former     Packs/day: 1.00     Years: 15.00     Total pack years: 15.00     Types: Cigarettes     Quit date:      Years since quittin.8   • Smokeless tobacco: Never   • Tobacco comments:     former smoker, per [employer]   Vaping Use   • Vaping Use: Never used   Substance and Sexual Activity   • Alcohol use: Not Currently     Comment: being a social drinker, per [employer]   • Drug use: Not Currently     Types: Marijuana   • Sexual activity: None   Other Topics Concern   • None   Social History Narrative   • None     Social Determinants of Health     Financial Resource Strain: Not on file   Food Insecurity: Not on file   Transportation Needs: Not on file   Physical Activity: Not on file   Stress: Not on file   Social Connections: Not on file   Intimate Partner Violence: Not on file   Housing Stability: Not on file      Current Medications:     Current Outpatient Medications   Medication Sig Dispense Refill   • amLODIPine (NORVASC) 5 mg tablet Take 1 tablet (5 mg total) by mouth daily 30 tablet 5   • losartan-hydrochlorothiazide (HYZAAR) 50-12.5 mg per tablet Take 1 tablet by mouth daily 90 tablet 5   • metoprolol succinate (TOPROL-XL) 100 mg 24 hr tablet take 1 tablet by mouth once daily 90 tablet 3   • sildenafil (VIAGRA) 50 MG tablet Take 1 tablet (50 mg total) by mouth as needed for erectile dysfunction 30 tablet 1     No current facility-administered medications for this visit. Allergies:     No Known Allergies   Physical Exam:     /92 (BP Location: Left arm, Patient Position: Sitting)   Pulse 78   Temp 97.8 °F (36.6 °C) (Temporal)   Ht 5' 11" (1.803 m)   Wt 118 kg (260 lb 9.6 oz)   SpO2 97%   BMI 36.35 kg/m²     Physical Exam  Vitals and nursing note reviewed. Constitutional:       General: He is not in acute distress. Appearance: He is well-developed. HENT:      Head: Normocephalic and atraumatic. Eyes:      Conjunctiva/sclera: Conjunctivae normal.   Cardiovascular:      Rate and Rhythm: Normal rate and regular rhythm. Heart sounds: No murmur heard. Pulmonary:      Effort: Pulmonary effort is normal. No respiratory distress. Breath sounds: Normal breath sounds. Abdominal:      Palpations: Abdomen is soft. Tenderness: There is no abdominal tenderness. Musculoskeletal:         General: No swelling. Cervical back: Neck supple. Skin:     General: Skin is warm and dry. Capillary Refill: Capillary refill takes less than 2 seconds. Neurological:      Mental Status: He is alert.    Psychiatric:         Mood and Affect: Mood normal.          Isi Pringle, [address] [place] [address] [facility]

## 2023-11-17 NOTE — PATIENT INSTRUCTIONS
Wellness Visit for Adults   AMBULATORY CARE:   A wellness visit  is when you see your healthcare provider to get screened for health problems. Your healthcare provider will also give you advice on how to stay healthy. Write down your questions so you remember to ask them. Ask your healthcare provider how often you should have a wellness visit. What happens at a wellness visit:  Your healthcare provider will ask about your health, and your family history of health problems. This includes high blood pressure, heart disease, and cancer. He or she will ask if you have symptoms that concern you, if you smoke, and about your mood. You may also be asked about your intake of medicines, supplements, food, and alcohol. Any of the following may be done: Your weight  will be checked. Your height may also be checked so your body mass index (BMI) can be calculated. Your BMI shows if you are at a healthy weight. Your blood pressure  and heart rate will be checked. Your temperature may also be checked. Blood and urine tests  may be done. Blood tests may be done to check your cholesterol levels. Abnormal cholesterol levels increase your risk for heart disease and stroke. You may also need a blood or urine test to check for diabetes if you are at increased risk. Urine tests may be done to look for signs of an infection or kidney disease. A physical exam  includes checking your heartbeat and lungs with a stethoscope. Your healthcare provider may also check your skin to look for sun damage. Screening tests  may be recommended. A screening test is done to check for diseases that may not cause symptoms. The screening tests you may need depend on your age, gender, family history, and lifestyle habits. For example, colorectal screening may be recommended if you are 48years old or older. Screening tests you need if you are a woman:   A Pap smear  is used to screen for cervical cancer.  Pap smears are usually done every 3 to 5 years depending on your age. You may need them more often if you have had abnormal Pap smear test results in the past. Ask your healthcare provider how often you should have a Pap smear. A mammogram  is an x-ray of your breasts to screen for breast cancer. Experts recommend mammograms every 2 years starting at age 48 years. You may need a mammogram at age 52 years or younger if you have an increased risk for breast cancer. Talk to your healthcare provider about when you should start having mammograms and how often you need them. Vaccines you may need:   Get an influenza vaccine  every year. The influenza vaccine protects you from the flu. Several types of viruses cause the flu. The viruses change over time, so new vaccines are made each year. Get a tetanus-diphtheria (Td) booster vaccine  every 10 years. This vaccine protects you against tetanus and diphtheria. Tetanus is a severe infection that may cause painful muscle spasms and lockjaw. Diphtheria is a severe bacterial infection that causes a thick covering in the back of your mouth and throat. Get a human papillomavirus (HPV) vaccine  if you are female and aged 23 to 32 or male 23 to 24 and never received it. This vaccine protects you from HPV infection. HPV is the most common infection spread by sexual contact. HPV may also cause vaginal, penile, and anal cancers. Get a pneumococcal vaccine  if you are aged 72 years or older. The pneumococcal vaccine is an injection given to protect you from pneumococcal disease. Pneumococcal disease is an infection caused by pneumococcal bacteria. The infection may cause pneumonia, meningitis, or an ear infection. Get a shingles vaccine  if you are 60 or older, even if you have had shingles before. The shingles vaccine is an injection to protect you from the varicella-zoster virus. This is the same virus that causes chickenpox.  Shingles is a painful rash that develops in people who had chickenpox or have been exposed to the virus. How to eat healthy:  My Plate is a model for planning healthy meals. It shows the types and amounts of foods that should go on your plate. Fruits and vegetables make up about half of your plate, and grains and protein make up the other half. A serving of dairy is included on the side of your plate. The amount of calories and serving sizes you need depends on your age, gender, weight, and height. Examples of healthy foods are listed below:  Eat a variety of vegetables  such as dark green, red, and orange vegetables. You can also include canned vegetables low in sodium (salt) and frozen vegetables without added butter or sauces. Eat a variety of fresh fruits , canned fruit in 100% juice, frozen fruit, and dried fruit. Include whole grains. At least half of the grains you eat should be whole grains. Examples include whole-wheat bread, wheat pasta, brown rice, and whole-grain cereals such as oatmeal.    Eat a variety of protein foods such as seafood (fish and shellfish), lean meat, and poultry without skin (turkey and chicken). Examples of lean meats include pork leg, shoulder, or tenderloin, and beef round, sirloin, tenderloin, and extra lean ground beef. Other protein foods include eggs and egg substitutes, beans, peas, soy products, nuts, and seeds. Choose low-fat dairy products such as skim or 1% milk or low-fat yogurt, cheese, and cottage cheese. Limit unhealthy fats  such as butter, hard margarine, and shortening. Exercise:  Exercise at least 30 minutes per day on most days of the week. Some examples of exercise include walking, biking, dancing, and swimming. You can also fit in more physical activity by taking the stairs instead of the elevator or parking farther away from stores. Include muscle strengthening activities 2 days each week. Regular exercise provides many health benefits.  It helps you manage your weight, and decreases your risk for type 2 diabetes, heart disease, stroke, and high blood pressure. Exercise can also help improve your mood. Ask your healthcare provider about the best exercise plan for you. General health and safety guidelines:   Do not smoke. Nicotine and other chemicals in cigarettes and cigars can cause lung damage. Ask your healthcare provider for information if you currently smoke and need help to quit. E-cigarettes or smokeless tobacco still contain nicotine. Talk to your healthcare provider before you use these products. Limit alcohol. A drink of alcohol is 12 ounces of beer, 5 ounces of wine, or 1½ ounces of liquor. Lose weight, if needed. Being overweight increases your risk of certain health conditions. These include heart disease, high blood pressure, type 2 diabetes, and certain types of cancer. Protect your skin. Do not sunbathe or use tanning beds. Use sunscreen with a SPF 15 or higher. Apply sunscreen at least 15 minutes before you go outside. Reapply sunscreen every 2 hours. Wear protective clothing, hats, and sunglasses when you are outside. Drive safely. Always wear your seatbelt. Make sure everyone in your car wears a seatbelt. A seatbelt can save your life if you are in an accident. Do not use your cell phone when you are driving. This could distract you and cause an accident. Pull over if you need to make a call or send a text message. Practice safe sex. Use latex condoms if are sexually active and have more than one partner. Your healthcare provider may recommend screening tests for sexually transmitted infections (STIs). Wear helmets, lifejackets, and protective gear. Always wear a helmet when you ride a bike or motorcycle, go skiing, or play sports that could cause a head injury. Wear protective equipment when you play sports. Wear a lifejacket when you are on a boat or doing water sports.     © Copyright [employer] 2023 Information is for End User's use only and may not be sold, redistributed or otherwise used for commercial purposes. The above information is an  only. It is not intended as medical advice for individual conditions or treatments. Talk to your doctor, nurse or pharmacist before following any medical regimen to see if it is safe and effective for you.

## 2023-12-08 DIAGNOSIS — I49.8 BIGEMINY: ICD-10-CM

## 2023-12-08 DIAGNOSIS — I10 ESSENTIAL HYPERTENSION: ICD-10-CM

## 2023-12-08 RX ORDER — METOPROLOL SUCCINATE 100 MG/1
TABLET, EXTENDED RELEASE ORAL
Qty: 90 TABLET | Refills: 3 | Status: SHIPPED | OUTPATIENT
Start: 2023-12-08

## 2024-04-13 DIAGNOSIS — I10 ESSENTIAL HYPERTENSION: ICD-10-CM

## 2024-04-15 RX ORDER — LOSARTAN POTASSIUM AND HYDROCHLOROTHIAZIDE 12.5; 5 MG/1; MG/1
1 TABLET ORAL DAILY
Qty: 90 TABLET | Refills: 1 | Status: SHIPPED | OUTPATIENT
Start: 2024-04-15

## 2024-06-19 DIAGNOSIS — I10 ESSENTIAL HYPERTENSION: ICD-10-CM

## 2024-06-19 RX ORDER — AMLODIPINE BESYLATE 5 MG/1
5 TABLET ORAL DAILY
Qty: 30 TABLET | Refills: 5 | Status: SHIPPED | OUTPATIENT
Start: 2024-06-19

## 2024-07-02 ENCOUNTER — TELEPHONE (OUTPATIENT)
Age: 59
End: 2024-07-02

## 2024-07-02 NOTE — TELEPHONE ENCOUNTER
Patient's wife called, he tested positive for Covid-19. She is wondering if the medicine would be an option for him as he is feeling very run down. She requests a call back to advise.

## 2024-10-08 ENCOUNTER — OFFICE VISIT (OUTPATIENT)
Dept: OBGYN CLINIC | Facility: CLINIC | Age: 59
End: 2024-10-08
Payer: COMMERCIAL

## 2024-10-08 ENCOUNTER — APPOINTMENT (OUTPATIENT)
Dept: RADIOLOGY | Facility: CLINIC | Age: 59
End: 2024-10-08
Payer: COMMERCIAL

## 2024-10-08 VITALS
OXYGEN SATURATION: 97 % | SYSTOLIC BLOOD PRESSURE: 148 MMHG | DIASTOLIC BLOOD PRESSURE: 84 MMHG | HEIGHT: 71 IN | HEART RATE: 78 BPM | BODY MASS INDEX: 35.7 KG/M2 | WEIGHT: 255 LBS

## 2024-10-08 DIAGNOSIS — M25.561 RIGHT KNEE PAIN, UNSPECIFIED CHRONICITY: ICD-10-CM

## 2024-10-08 DIAGNOSIS — M62.9 HAMSTRING TIGHTNESS OF BOTH LOWER EXTREMITIES: ICD-10-CM

## 2024-10-08 DIAGNOSIS — R29.898 WEAKNESS OF BOTH HIPS: ICD-10-CM

## 2024-10-08 DIAGNOSIS — M22.2X1 PATELLOFEMORAL SYNDROME OF RIGHT KNEE: Primary | ICD-10-CM

## 2024-10-08 PROCEDURE — 99203 OFFICE O/P NEW LOW 30 MIN: CPT | Performed by: FAMILY MEDICINE

## 2024-10-08 PROCEDURE — 73562 X-RAY EXAM OF KNEE 3: CPT

## 2024-10-08 NOTE — PROGRESS NOTES
Assessment/Plan:  Assessment & Plan   Diagnoses and all orders for this visit:    Patellofemoral syndrome of right knee  -     XR knee 3 vw right non injury; Future  -     Brace  -     Ambulatory Referral to Physical Therapy; Future    Weakness of both hips  -     Ambulatory Referral to Physical Therapy; Future    Hamstring tightness of both lower extremities  -     Ambulatory Referral to Physical Therapy; Future        59-year-old active male with right knee pain of more than few months duration.  Discussed with patient physical exam, imaging studies, impression, and plan.  X-rays right knee noted for lateral patella tilt.  Imaging studies, clinical exam, and history suggest that he has symptoms from abnormal patellofemoral mechanics.  I discussed treatment regimen of bracing, supplements, and formal therapy.  Surgery and injection not warranted.  I provided patellar stabilizing knee brace for him to wear during physical activity.  He started taking turmeric, tart cherry, and glucosamine supplements.  He is to start physical therapy as soon as possible and do home exercises of the right hip.  Recommend he visit athletic shoe store to be advised recommended on supportive footwear.  He will follow-up as needed.        Subjective:   Patient ID: Anibal Voss is a 59 y.o. male.  Chief Complaint   Patient presents with    Right Knee - Pain        59-year-old active male presents for evaluation right knee pain more than few months duration.  He denies any trauma or inciting event.  Pain described as gradual in onset, localized to the anterior infrapatellar aspect of the knees, none radiating, worse with going down steps and walking down hills, associated with clicking and feeling of instability, and improved with resting or changing activity.  He does not usually take medication for symptoms.  He states he does not have much issue when walking on level ground.    Knee Pain  This is a new problem. The current episode  "started more than 1 month ago. The problem occurs daily. The problem has been gradually worsening. Associated symptoms include arthralgias. Pertinent negatives include no joint swelling, numbness or weakness. Exacerbated by: Stairs. He has tried rest and position changes for the symptoms. The treatment provided mild relief.           The following portions of the patient's history were reviewed and updated as appropriate: He  has a past medical history of Hypertension, Inner ear dysfunction, Irreducible umbilical hernia, and Pneumonia.  He has No Known Allergies..    Review of Systems   Musculoskeletal:  Positive for arthralgias. Negative for joint swelling.   Neurological:  Negative for weakness and numbness.       Objective:  Vitals:    10/08/24 0932   BP: 148/84   Pulse: 78   SpO2: 97%   Weight: 116 kg (255 lb)   Height: 5' 11\" (1.803 m)      Right Knee Exam     Muscle Strength   The patient has normal right knee strength.    Tenderness   Right knee tenderness location: Patellar facet.    Range of Motion   Extension:  normal   Flexion:  130     Tests   Rosy:  Medial - negative Lateral - negative  Varus: negative Valgus: negative    Other   Swelling: none    Comments:  Positive patella inhibition and grind      Right Hip Exam     Muscle Strength   Abduction: 4/5   Flexion: 5/5     Tests   FATIMAH: negative    Comments:  Negative FADDIR  Hamstring tightness            Physical Exam  Vitals and nursing note reviewed.   Constitutional:       Appearance: Normal appearance. He is well-developed. He is not ill-appearing or diaphoretic.   HENT:      Head: Normocephalic and atraumatic.      Right Ear: External ear normal.      Left Ear: External ear normal.   Eyes:      Conjunctiva/sclera: Conjunctivae normal.   Neck:      Trachea: No tracheal deviation.   Cardiovascular:      Rate and Rhythm: Normal rate.   Pulmonary:      Effort: Pulmonary effort is normal. No respiratory distress.   Abdominal:      General: There is " no distension.   Musculoskeletal:         General: Tenderness present. No swelling.      Right knee:      Instability Tests: Medial Rosy test negative and lateral Rosy test negative.   Skin:     General: Skin is warm and dry.      Coloration: Skin is not jaundiced or pale.   Neurological:      Mental Status: He is alert and oriented to person, place, and time.   Psychiatric:         Mood and Affect: Mood normal.         Behavior: Behavior normal.         Thought Content: Thought content normal.         Judgment: Judgment normal.         I have personally reviewed pertinent films in PACS and my interpretation is  .  Lateral tilt of the patella.  No significant degenerative change of the knee.    More than 30 minutes were spent reviewing patient chart, reviewing and interpreting imaging studies, obtaining history from patient, examining patient, discussing and implementing  treatment plan.

## 2024-10-08 NOTE — PATIENT INSTRUCTIONS
Knee brace during physical activity    Over the counter vitamins:    - Turmeric vitamin at least 1000 mg daily    - Tart cherry vitamin at least 1000 mg daily    - Glucosamine-chondrointin 2-3 times daily    [employer] [facility] in [place], [place]    Physical Therapy and home exercises

## 2024-10-09 ENCOUNTER — EVALUATION (OUTPATIENT)
Dept: PHYSICAL THERAPY | Facility: CLINIC | Age: 59
End: 2024-10-09
Payer: COMMERCIAL

## 2024-10-09 DIAGNOSIS — M62.9 HAMSTRING TIGHTNESS OF BOTH LOWER EXTREMITIES: ICD-10-CM

## 2024-10-09 DIAGNOSIS — M22.2X1 PATELLOFEMORAL SYNDROME OF RIGHT KNEE: ICD-10-CM

## 2024-10-09 DIAGNOSIS — R29.898 WEAKNESS OF BOTH HIPS: ICD-10-CM

## 2024-10-09 PROCEDURE — 97161 PT EVAL LOW COMPLEX 20 MIN: CPT

## 2024-10-09 PROCEDURE — 97110 THERAPEUTIC EXERCISES: CPT

## 2024-10-09 NOTE — PROGRESS NOTES
PT Evaluation     Today's date: 10/9/2024  Patient name: Anibal Voss  : 1965  MRN: [id number]  Referring provider: Paola Winter*  Dx:   Encounter Diagnosis     ICD-10-CM    1. Patellofemoral syndrome of right knee  M22.2X1 Ambulatory Referral to Physical Therapy      2. Weakness of both hips  R29.898 Ambulatory Referral to Physical Therapy      3. Hamstring tightness of both lower extremities  M62.9 Ambulatory Referral to Physical Therapy          Start Time: 820  Stop Time: 855  Total time in clinic (min): 35 minutes    Assessment  Impairments: activity intolerance, impaired physical strength, lacks appropriate home exercise program, pain with function and participation limitations  Symptom irritability: low    Assessment details: Pt is a pleasant 60 y/o male presenting to outpatient physical therapy with right knee pain.  Upon examination, LE ROM largely WNL save for notable hamstring and glute med tightness bilaterally, more significant at right side.  Manual muscle testing reveals weakness in right hip flexors, extensors, and abductors.  Noted pain on deep squat as well as hypomobility of patella during medial/lateral glide.  At this time negative for meniscal pathology based on performed tests and measures.  No red flags noted at this time.  Pt signs and symptoms consistent with referring diagnosis, educated pt on knee joint anatomy, biomechanics, pathophysiology, and prognosis.  Pt verbalized understanding and is agreeable to POC.  Patient will benefit from skilled physical therapy, including therapeutic exercise, stretching, manual therapy, and modalities prn to improve their level of function, to increase overall quality of life, and to address his impairments.    Dispensed home exercise program and educated patient on proper technique, frequency, and the possibility of DOMS for the next 24 to 48 hours. Patient demonstrated understanding and was advised to call us if he has any further  "questions.   Understanding of Dx/Px/POC: good     Prognosis: good    Goals  STG to be achieved by 4 weeks  -Pt will be independent with basic HEP  -Pt will improve MMT scores by 1/2 grade in all deficient planes in order to facilitate ease of functional activity  -Pt will improve ROM by 25% in all deficient planes in order to improve functional mobility  -Pt will report 3/10 at worst in order to facilitate return to PLOF    LTG to be achieved by Discharge  -Pt will be independent with comprehensive HEP  -Pt will improve MMT scores to WFL in all deficient planes in order to facilitate ease of functional activity  -Pt will improve ROM to WFL in all deficient planes in order to improve functional mobility  -Pt will report 1/10 at worst in order to demonstrate return to PLOF  -Pt will report 50% reduction in pain when descending stairs to demonstrate return to pain free functional activity.    Plan  Patient would benefit from: skilled physical therapy  Referral necessary: No    Planned therapy interventions: flexibility, home exercise program, joint mobilization, manual therapy, massage, neuromuscular re-education, strengthening, stretching, therapeutic activities and therapeutic exercise    Frequency: 2x week  Duration in weeks: 12  Plan of Care beginning date: 10/9/2024  Plan of Care expiration date: 1/1/2025  Treatment plan discussed with: patient        Subjective Evaluation    History of Present Illness  Date of onset: 9/9/2024  Mechanism of injury: Anibal is a 59 y.o. male presenting to physical therapy on 10/09/24 with referral from MD for right knee pain.  Pt states that it feels like its gonna pop out sometimes especially when going down hills and when descending stairs.  Pt states it has been going on for over a month now and states that he recently saw ortho who told him \"his knee is out of alignment.\"  Pt states that the pain is located at inferior/medial aspect of patella.  Pt states that the pain only lasts " "for a few seconds before going away.  Pt was given a knee brace to wear during activity and states \"it felt ok.\"  Pt is currently retired.         Quality of life: good    Patient Goals  Patient goals for therapy: decreased pain, increased motion, increased strength and return to sport/leisure activities    Pain  Current pain ratin  At best pain ratin  At worst pain ratin  Location: Inferior aspect of patella  Quality: sharp  Relieving factors: rest  Aggravating factors: standing and stair climbing  Progression: no change      Diagnostic Tests  X-ray: normal        Objective     Active Range of Motion   Left Hip   Normal active range of motion    Right Hip   Normal active range of motion  Left Knee   Normal active range of motion    Right Knee   Normal active range of motion  Left Ankle/Foot   Normal active range of motion    Right Ankle/Foot   Normal active range of motion    Mobility   Patellar Mobility:     Right Knee   Hypomobile: medial and lateral     Strength/Myotome Testing     Left Hip   Planes of Motion   Flexion: 5  Extension: 4  Abduction: 5    Right Hip   Planes of Motion   Flexion: 4+  Extension: 4  Abduction: 4+    Left Knee   Extension: 5    Right Knee   Flexion: 4+  Extension: 5    Tests     Left Hip   Negative modified Shelley and Shelley.     Right Hip   Positive Shelley.   Negative modified Shelley.     Right Knee   Negative lateral Rosy, medial Rosy, Thessaly's test at 5 degrees and Thessaly's test at 20 degrees.              Precautions: Standardn precautions    POC expires Unit limit Auth  expiration date PT/OT + Visit Limit?   25   45                 Visit/Unit Tracking  AUTH Status:  Date 10/9              N/A Used 1               Remaining  44                   Manuals             Knee distraction                                                    Neuro Re-Ed             LAQ HEP            Sidelying hip ABD HEP            Standing 3 way hip             Matrix Flex/Ext           "   Monster walks             TG SL squats             TG SL heel raises                                       Ther Ex             Pt education Anatomy, pathophysiology, HEP            Upright bike             Seated hamstring stretch w/ stool HEP            Supine hamstring stretch w/ strap                                                                 Ther Activity             FSD             LSD             Gait Training                                       Modalities

## 2024-10-16 ENCOUNTER — OFFICE VISIT (OUTPATIENT)
Dept: PHYSICAL THERAPY | Facility: CLINIC | Age: 59
End: 2024-10-16
Payer: COMMERCIAL

## 2024-10-16 DIAGNOSIS — M22.2X1 PATELLOFEMORAL SYNDROME OF RIGHT KNEE: Primary | ICD-10-CM

## 2024-10-16 DIAGNOSIS — R29.898 WEAKNESS OF BOTH HIPS: ICD-10-CM

## 2024-10-16 DIAGNOSIS — M62.9 HAMSTRING TIGHTNESS OF BOTH LOWER EXTREMITIES: ICD-10-CM

## 2024-10-16 PROCEDURE — 97112 NEUROMUSCULAR REEDUCATION: CPT

## 2024-10-16 PROCEDURE — 97140 MANUAL THERAPY 1/> REGIONS: CPT

## 2024-10-16 PROCEDURE — 97110 THERAPEUTIC EXERCISES: CPT

## 2024-10-16 NOTE — PROGRESS NOTES
Diagnoses and all orders for this visit:    Grade III hemorrhoids       Grade III hemorrhoids  Patient presents for follow-up of symptomatic hemorrhoids.  He notes since last visit at which time he went examination as well as colonoscopy he has had worsening rectal bleeding.  He also has worsening prolapse symptoms.  Examination shows prolapsing tissue in the right anterior lateral position.  Small grade 2 internal hemorrhoids were noted as well.    We discussed care of this.  Given he has ongoing every day symptoms, procedural intervention is recommended.  We specifically discussed rubber band ligation as well as excisional hemorrhoidectomy.  With this discussion patient wishes for excisional hemorrhoidectomy.  Risks of anal surgery, including but not limited to pain, bleeding, failure to heal properly, fecal incontinence, persistent or recurrent anal disease, infection, fistula, abscess were reviewed. Questions were answered.      HPI    Anibal ROBERTO Bipin presents for concerns of hemorrhoids.     The patient notes off and on painless bright red rectal bleeding with bowel movements.   The patient notes anal  protrusion.     Previously seen in the office on 4/21/2022 for rectal bleeding.     Last colonoscopy performed on 4/29/2022, with  a 5 year recall.     Past Medical History:   Diagnosis Date    Hypertension     Inner ear dysfunction     Irreducible umbilical hernia     last assessed 21Oct2013    Pneumonia      Past Surgical History:   Procedure Laterality Date    COLONOSCOPY N/A 9/16/2016    Procedure: COLONOSCOPY;  Surgeon: Lui sM Xiao MD;  Location: [facility];  Service:     HERNIA REPAIR      umbilical    SHOULDER SURGERY Right        Current Outpatient Medications:     amLODIPine (NORVASC) 5 mg tablet, take 1 tablet by mouth once daily, Disp: 30 tablet, Rfl: 5    losartan-hydrochlorothiazide (HYZAAR) 50-12.5 mg per tablet, take 1 tablet by mouth once daily, Disp: 90 tablet, Rfl: 1    metoprolol succinate  (TOPROL-XL) 100 mg 24 hr tablet, take 1 tablet by mouth once daily, Disp: 90 tablet, Rfl: 3    sildenafil (VIAGRA) 50 MG tablet, Take 1 tablet (50 mg total) by mouth as needed for erectile dysfunction, Disp: 30 tablet, Rfl: 1  Allergies as of 10/17/2024    (No Known Allergies)     Review of Systems   Gastrointestinal:  Positive for anal bleeding.   All other systems reviewed and are negative.    There were no vitals filed for this visit.  Physical Exam  Constitutional:       Appearance: Normal appearance.   HENT:      Head: Normocephalic and atraumatic.   Eyes:      Extraocular Movements: Extraocular movements intact.      Pupils: Pupils are equal, round, and reactive to light.   Pulmonary:      Effort: Pulmonary effort is normal.   Abdominal:      General: Abdomen is flat.   Genitourinary:     Comments: Hemorrhoidal prolapse noted  Musculoskeletal:         General: Normal range of motion.   Neurological:      General: No focal deficit present.      Mental Status: He is alert and oriented to person, place, and time.   Psychiatric:         Mood and Affect: Mood normal.         Behavior: Behavior normal.         Thought Content: Thought content normal.         Judgment: Judgment normal.

## 2024-10-16 NOTE — PROGRESS NOTES
Daily Note     Today's date: 10/16/2024  Patient name: Anibal Voss  : 1965  MRN: [id number]  Referring provider: Paola Winter*  Dx:   Encounter Diagnosis     ICD-10-CM    1. Patellofemoral syndrome of right knee  M22.2X1       2. Weakness of both hips  R29.898       3. Hamstring tightness of both lower extremities  M62.9                      Subjective: Pt reports home exercises have been going      Objective: See treatment diary below      Assessment: Tolerated treatment well. Initiated strengthening exercises today for hip and knee musculature with emphasis on eccentric control.  Pt able to complete all exercises today without difficulty or pain.  Updated hep to include additional exercises to further strengthen knee joint, pt demonstrated understanding.  Patient would benefit from continued PT      Plan: Continue per plan of care.      Precautions: Standardn precautions    POC expires Unit limit Auth  expiration date PT/OT + Visit Limit?   25   45                 Visit/Unit Tracking  AUTH Status:  Date 10/9 10/16             N/A Used 1 2              Remaining  44 43                  Manuals 10/9 10/16           Knee distraction  JK                                                  Neuro Re-Ed             LAQ HEP            Sidelying hip ABD HEP            Standing 3 way hip  ABD/EXT 2x10 GTB B/L           Matrix Flex/Ext  2x10 60#           Monster walks  2 laps length of gym RTB           TG SL squats  2x10 ECC L22           TG SL heel raises  2x10 ECC L22                                     Ther Ex             Pt education Anatomy, pathophysiology, HEP HEP           Upright bike  L2 10'           Seated hamstring stretch w/ stool HEP            Supine hamstring stretch w/ strap  3x30'' R           Prone quad stretch w/ strap  3x30'' R                                                  Ther Activity             FSD             LSD  2x10 6'' ECC           Gait Training                                        Modalities

## 2024-10-17 ENCOUNTER — PREP FOR PROCEDURE (OUTPATIENT)
Age: 59
End: 2024-10-17

## 2024-10-17 ENCOUNTER — OFFICE VISIT (OUTPATIENT)
Age: 59
End: 2024-10-17
Payer: COMMERCIAL

## 2024-10-17 ENCOUNTER — TELEPHONE (OUTPATIENT)
Age: 59
End: 2024-10-17

## 2024-10-17 VITALS — HEIGHT: 71 IN | BODY MASS INDEX: 37.66 KG/M2 | WEIGHT: 269 LBS

## 2024-10-17 DIAGNOSIS — K62.5 RECTAL BLEEDING: Primary | ICD-10-CM

## 2024-10-17 DIAGNOSIS — K64.2 GRADE III HEMORRHOIDS: Primary | ICD-10-CM

## 2024-10-17 PROCEDURE — 99213 OFFICE O/P EST LOW 20 MIN: CPT | Performed by: COLON & RECTAL SURGERY

## 2024-10-17 NOTE — TELEPHONE ENCOUNTER
Patient scheduled for surgery 11/4/2024  at [place]. Instructions and PAT's gone over with and mailed to the patient.

## 2024-10-17 NOTE — LETTER
Anibal Voss  [address]  [place] [address]        ------------------------------------------------------------------------------------------------------------  10/17/2024    Dear Anibal Voss,    Your surgery is scheduled for: 11/4/2024   The hospital will call the evening prior to your surgery with your expected arrival time.   Location:[address] [address]    CHECK LIST PRIOR TO OUTPATIENT SURGERY  It is your responsibility to obtain any/all referrals needed for your surgery if required by your insurance. Our office will contact you to discuss your insurance coverage for this procedure.     Special instructions required if you are taking any blood thinners. Please verify with the prescribing physician. Examples include Coumadin, Plavix, Xarelto, Eliquis, Pradaxa, etc.     Please check with your family physician if you are taking the following medications: Aspirin or any Aspirin containing medication, Gingko biloba, Ginseng, Feverfew, and/or Rey’s Wort. We suggest stopping these for 3 days.     The night before and the day of your surgery, wash from your neck to groin with chlorhexidine soap. This soap is available at most retail pharmacies under such brand names as Hibiclens, Endure or Aplicare.     Pre-admission testing Required: YES      NO x        NOTHING TO EAT OR DRINK AFTER MIDNIGHT PRIOR TO SURGERY.     Take ONE FLEET ENEMA one hour prior to leaving for the hospital.     Please do not hesitate to call our office with any questions regarding your surgery.                      Post-Operative Care Information   Hemorrhoidectomy, EUA, Fissurectomy, Fistulotomy, Sphincterotomy      Resume high fiber diet. Fiber supplementation with Metamucil or Konsyl also recommended daily.       Your first bowel movement may take up to 3 days after surgery. THIS IS OFTEN PAINFUL.      While taking narcotic pain medication, you should remain on an  over-the-counter stool softener such as Colace (one tablet twice daily). For constipation Miralax can be taken up to three times daily.     Pain is to be expected after hemorrhoid surgery. Ibuprofen (400? 600MG) every 6?8 hours is an excellent alternative in addition or as a substitute to your narcotic pain medication.     Rectal bleeding or drainage is normal after surgery.       Nausea is a common complaint post op. This can be associated with the narcotic pain medications, anesthesia as well as with severe constipation.     Driving may be resumed when all off all narcotic pain medications and you can turn or twist your body without hesitation.    If you are unable to urinate within 8 hours after surgery, please call the office at [phone]    Frequent warm tub soaks or warm showers are often soothing, we suggest 3 to 4 times daily.    After bowel movements, you may wash with a hand shower or warm tub soak.  No soap is okay.     No strenuous activity (i.e., Running, jogging, swimming, or weightlifting) for up to one week after surgery.     When will I receive follow-up care?      You should be scheduled for a post-op appointment within 6-8 weeks after surgery, unless otherwise instructed on your discharge summary. If you do not have an existing appointment at the time of discharge, please call our office at [phone].    Call the office at [phone] immediately if you have a fever, chills, excessive sweating, difficulty urinating, or excessive/profuse bleeding with clots.

## 2024-10-17 NOTE — ASSESSMENT & PLAN NOTE
Patient presents for follow-up of symptomatic hemorrhoids.  He notes since last visit at which time he went examination as well as colonoscopy he has had worsening rectal bleeding.  He also has worsening prolapse symptoms.  Examination shows prolapsing tissue in the right anterior lateral position.  Small grade 2 internal hemorrhoids were noted as well.    We discussed care of this.  Given he has ongoing every day symptoms, procedural intervention is recommended.  We specifically discussed rubber band ligation as well as excisional hemorrhoidectomy.  With this discussion patient wishes for excisional hemorrhoidectomy.  Risks of anal surgery, including but not limited to pain, bleeding, failure to heal properly, fecal incontinence, persistent or recurrent anal disease, infection, fistula, abscess were reviewed. Questions were answered.

## 2024-10-20 DIAGNOSIS — I10 ESSENTIAL HYPERTENSION: ICD-10-CM

## 2024-10-21 ENCOUNTER — ANESTHESIA EVENT (OUTPATIENT)
Dept: PERIOP | Facility: AMBULARY SURGERY CENTER | Age: 59
End: 2024-10-21
Payer: COMMERCIAL

## 2024-10-21 RX ORDER — LOSARTAN POTASSIUM AND HYDROCHLOROTHIAZIDE 12.5; 5 MG/1; MG/1
1 TABLET ORAL DAILY
Qty: 30 TABLET | Refills: 1 | Status: SHIPPED | OUTPATIENT
Start: 2024-10-21

## 2024-10-23 ENCOUNTER — OFFICE VISIT (OUTPATIENT)
Dept: PHYSICAL THERAPY | Facility: CLINIC | Age: 59
End: 2024-10-23
Payer: COMMERCIAL

## 2024-10-23 DIAGNOSIS — M22.2X1 PATELLOFEMORAL SYNDROME OF RIGHT KNEE: Primary | ICD-10-CM

## 2024-10-23 DIAGNOSIS — R29.898 WEAKNESS OF BOTH HIPS: ICD-10-CM

## 2024-10-23 DIAGNOSIS — M62.9 HAMSTRING TIGHTNESS OF BOTH LOWER EXTREMITIES: ICD-10-CM

## 2024-10-23 PROCEDURE — 97530 THERAPEUTIC ACTIVITIES: CPT

## 2024-10-23 PROCEDURE — 97110 THERAPEUTIC EXERCISES: CPT

## 2024-10-23 NOTE — PROGRESS NOTES
Daily Note     Today's date: 10/23/2024  Patient name: Anibal Voss  : 1965  MRN: [id number]  Referring provider: Paola Winter*  Dx:   Encounter Diagnosis     ICD-10-CM    1. Patellofemoral syndrome of right knee  M22.2X1       2. Weakness of both hips  R29.898       3. Hamstring tightness of both lower extremities  M62.9           Start Time: 0815  Stop Time: 0845  Total time in clinic (min): 30 minutes    Subjective: Pt noted that the only times he had pain in his R knee is when he is in full R knee flexion.       Pt did request to decrease length of PT treatment due to scheduling conflicts.     Objective: See treatment diary below      Assessment:  Continued with treatment session with R knee.  Progressed to stabilization exercises of R knee. Increase challenge noted with lunges but was able to complete all exercises. Tolerated treatment well. Patient exhibited good technique with therapeutic exercises and would benefit from continued PT.     DOMS may be noted s/p treatment session.       Plan: Continue per plan of care.  Progress as able.      Precautions: Standardn precautions    POC expires Unit limit Auth  expiration date PT/OT + Visit Limit?   25 4 24 45                 Visit/Unit Tracking  AUTH Status:  Date 10/9 10/16 10/23            N/A Used 1 2 3             Remaining  44 43 42                 Manuals 10/9 10/16 10/23          Knee distraction  JK NV if needed                                                 Neuro Re-Ed             LAQ HEP            Sidelying hip ABD HEP            Standing 3 way hip  ABD/EXT 2x10 GTB B/L           Matrix Flex/Ext  2x10 60# 65# 2x 10           Monster walks  2 laps length of gym RTB NV           TG SL squats  2x10 ECC L22 3x 10 ecc control           TG SL heel raises  2x10 ECC L22           Star slider    -Stabilize on RLE   10x                        Ther Ex             Pt education Anatomy, pathophysiology, HEP HEP           Upright bike   "L2 10' L2 8 min           Seated hamstring stretch w/ stool HEP            Supine hamstring stretch w/ strap  3x30'' R Standig at stairs   30\"x 3           Prone quad stretch w/ strap  3x30'' R                                                  Ther Activity             FSD   3x 10 6\"           LSD  2x10 6'' ECC 3x 10 6\"           KB squats    NV            Bwd slider Lunges   2x 10                        Gait Training                                       Modalities                                              "

## 2024-10-28 ENCOUNTER — OFFICE VISIT (OUTPATIENT)
Dept: URGENT CARE | Facility: CLINIC | Age: 59
End: 2024-10-28
Payer: COMMERCIAL

## 2024-10-28 VITALS
DIASTOLIC BLOOD PRESSURE: 92 MMHG | SYSTOLIC BLOOD PRESSURE: 137 MMHG | TEMPERATURE: 98.1 F | RESPIRATION RATE: 18 BRPM | OXYGEN SATURATION: 97 % | HEART RATE: 89 BPM

## 2024-10-28 DIAGNOSIS — J02.9 SORE THROAT: ICD-10-CM

## 2024-10-28 DIAGNOSIS — R59.0 LYMPHADENOPATHY OF RIGHT CERVICAL REGION: Primary | ICD-10-CM

## 2024-10-28 LAB — S PYO AG THROAT QL: NEGATIVE

## 2024-10-28 PROCEDURE — 87880 STREP A ASSAY W/OPTIC: CPT

## 2024-10-28 PROCEDURE — G0382 LEV 3 HOSP TYPE B ED VISIT: HCPCS

## 2024-10-28 PROCEDURE — S9083 URGENT CARE CENTER GLOBAL: HCPCS

## 2024-10-28 NOTE — PROGRESS NOTES
[facility]        NAME: Anibal Voss is a 59 y.o. male  : 1965    MRN: [id number]  DATE: 2024  TIME: 12:41 PM    Assessment and Plan   Lymphadenopathy of right cervical region [R59.0]  1. Lymphadenopathy of right cervical region        2. Sore throat  POCT rapid strepA        Rapid strep negative.   Declined treatment with antibiotics at this time. Will monitor symptoms closely and follow up with PCP if swelling persists for further evaluation.     Patient Instructions     Alternate between ice and heat.  Tylenol/motrin as needed.    Follow up with PCP in 3-5 days.  Proceed to the ER with worsening symptoms.     Chief Complaint     Chief Complaint   Patient presents with    Swollen Glands     Pt with right side swollen gland since yesterday. No other symptoms. Tender when pressing on it.         History of Present Illness       The patient presents today with complaints of R sided lymph node swelling to his neck that he noticed yesterday. States the area is also tender to the touch. Denies fever/chills, cough/congestion, ear pain, dental pain, or recent URI symptoms. Reports he also removed a tick from his R lower side and had 2 spider bites recently. Unsure if that could be related.         Review of Systems   Review of Systems   Constitutional:  Negative for chills and fever.   HENT:  Negative for congestion, ear pain, postnasal drip, rhinorrhea, sinus pressure, sinus pain and sore throat.    Respiratory:  Negative for cough.    Gastrointestinal:  Negative for abdominal pain, diarrhea, nausea and vomiting.   Genitourinary:  Negative for difficulty urinating.   Musculoskeletal:  Negative for myalgias.   Skin:  Positive for color change (R lateral side). Negative for rash.   Hematological:  Positive for adenopathy (R side of neck).         Current Medications       Current Outpatient Medications:     amLODIPine (NORVASC) 5 mg tablet, take 1 tablet by mouth once daily, Disp: 30 tablet,  Rfl: 5    losartan-hydrochlorothiazide (HYZAAR) 50-12.5 mg per tablet, take 1 tablet by mouth once daily, Disp: 30 tablet, Rfl: 1    metoprolol succinate (TOPROL-XL) 100 mg 24 hr tablet, take 1 tablet by mouth once daily, Disp: 90 tablet, Rfl: 3    sildenafil (VIAGRA) 50 MG tablet, Take 1 tablet (50 mg total) by mouth as needed for erectile dysfunction, Disp: 30 tablet, Rfl: 1    Current Allergies     Allergies as of 10/28/2024    (No Known Allergies)            The following portions of the patient's history were reviewed and updated as appropriate: allergies, current medications, past family history, past medical history, past social history, past surgical history and problem list.     Past Medical History:   Diagnosis Date    Hypertension     Inner ear dysfunction     Irreducible umbilical hernia     last assessed 21Oct2013    Pneumonia        Past Surgical History:   Procedure Laterality Date    COLONOSCOPY N/A 9/16/2016    Procedure: COLONOSCOPY;  Surgeon: Luis M Xiao MD;  Location: [facility];  Service:     HERNIA REPAIR      umbilical    SHOULDER SURGERY Right        Family History   Adopted: Yes   Family history unknown: Yes         Medications have been verified.        Objective   /92   Pulse 89   Temp 98.1 °F (36.7 °C)   Resp 18   SpO2 97%        Physical Exam     Physical Exam  Vitals and nursing note reviewed.   Constitutional:       General: He is not in acute distress.     Appearance: Normal appearance.   HENT:      Head: Normocephalic and atraumatic.      Right Ear: Tympanic membrane, ear canal and external ear normal.      Left Ear: Tympanic membrane, ear canal and external ear normal.      Nose: No congestion or rhinorrhea.      Mouth/Throat:      Mouth: Mucous membranes are moist.      Dentition: Normal dentition. No dental abscesses.      Pharynx: Uvula midline. No oropharyngeal exudate or postnasal drip.      Tonsils: No tonsillar exudate.   Eyes:      Pupils: Pupils are equal,  round, and reactive to light.   Cardiovascular:      Rate and Rhythm: Normal rate and regular rhythm.      Heart sounds: Normal heart sounds. No murmur heard.  Pulmonary:      Effort: Pulmonary effort is normal. No respiratory distress.      Breath sounds: Normal breath sounds. No decreased air movement. No decreased breath sounds, wheezing, rhonchi or rales.   Lymphadenopathy:      Cervical: Cervical adenopathy (R side) present.   Skin:     General: Skin is warm and dry.   Neurological:      Mental Status: He is alert and oriented to person, place, and time. Mental status is at baseline.   Psychiatric:         Mood and Affect: Mood normal.         Behavior: Behavior normal.

## 2024-10-28 NOTE — PATIENT INSTRUCTIONS
Alternate between ice and heat.  Tylenol/motrin as needed.    Follow up with PCP in 3-5 days.  Proceed to the ER with worsening symptoms.

## 2024-10-30 ENCOUNTER — PREP FOR PROCEDURE (OUTPATIENT)
Age: 59
End: 2024-10-30

## 2024-10-30 ENCOUNTER — OFFICE VISIT (OUTPATIENT)
Dept: PHYSICAL THERAPY | Facility: CLINIC | Age: 59
End: 2024-10-30
Payer: COMMERCIAL

## 2024-10-30 DIAGNOSIS — R29.898 WEAKNESS OF BOTH HIPS: ICD-10-CM

## 2024-10-30 DIAGNOSIS — M62.9 HAMSTRING TIGHTNESS OF BOTH LOWER EXTREMITIES: ICD-10-CM

## 2024-10-30 DIAGNOSIS — M22.2X1 PATELLOFEMORAL SYNDROME OF RIGHT KNEE: Primary | ICD-10-CM

## 2024-10-30 PROCEDURE — 97110 THERAPEUTIC EXERCISES: CPT

## 2024-10-30 PROCEDURE — 97112 NEUROMUSCULAR REEDUCATION: CPT

## 2024-10-30 PROCEDURE — 97530 THERAPEUTIC ACTIVITIES: CPT

## 2024-10-30 NOTE — PROGRESS NOTES
Daily Note     Today's date: 10/30/2024  Patient name: Anibal Voss  : 1965  MRN: [id number]  Referring provider: Paola Winter*  Dx:   Encounter Diagnosis     ICD-10-CM    1. Patellofemoral syndrome of right knee  M22.2X1       2. Weakness of both hips  R29.898       3. Hamstring tightness of both lower extremities  M62.9                      Subjective: Pt reports he continues to have pain when having to bend on his R knee to tie his shoes, states that descending stairs and walking down hill has been going better.  Pt does state that he will have to take 2-4 weeks off therapy d/t going in for surgery next Tuesday.      Objective: See treatment diary below      Assessment: Tolerated treatment well. Able to implement additional interventions with focus on eccentric control and dynamic knee stability,  pt able to complete all exercises today without difficulty or reproduction of pain.  Educated pt on continued stretching and activity within limitations of post-op recovery in order to decrease likelihood of symptom relapse, pt verbalized understanding.  PT will keep case open amidst post-op recovery period, pt encouraged to call clinic when ready to resume PT.  Patient would benefit from continued PT      Plan: Continue per plan of care.      Precautions: Standardn precautions    POC expires Unit limit Auth  expiration date PT/OT + Visit Limit?   25 4 24 45                 Visit/Unit Tracking  AUTH Status:  Date 10/9 10/16 10/23 10/30           N/A Used 1 2 3 4            Remaining  44 43 42 41                Manuals 10/9 10/16 10/23 10/30         Knee distraction  JK NV if needed                                                 Neuro Re-Ed             LAQ HEP            Sidelying hip ABD HEP            Standing 3 way hip  ABD/EXT 2x10 GTB B/L           Matrix Flex/Ext  2x10 60# 65# 2x 10  70# 2x10 ea w/ 2' knee flex stretch         Monster walks  2 laps length of gym RTB NV  2 laps length  "of gym RTB         TG SL squats  2x10 ECC L22 3x 10 ecc control  3x10 ECC L22         TG SL heel raises  2x10 ECC L22  3x10 ECC L22 Up 2 down 1         Star slider    -Stabilize on RLE   10x  10x BWD/LAT/FWD                      Ther Ex             Pt education Anatomy, pathophysiology, HEP HEP           Upright bike  L2 10' L2 8 min  L3 10'         Seated hamstring stretch w/ stool HEP            Supine hamstring stretch w/ strap  3x30'' R Standig at stairs   30\"x 3  3x30'' R         Prone quad stretch w/ strap  3x30'' R  3x30'' R                                                Ther Activity             FSD   3x 10 6\"  3x10 6'' ' ECC         LSD  2x10 6'' ECC 3x 10 6\"  3x10 6'' ECC         KB squats    NV  2x10 15# KB          Bwd slider Lunges   2x 10                        Gait Training                                       Modalities                                                "

## 2024-10-30 NOTE — PRE-PROCEDURE INSTRUCTIONS
Pre-Surgery Instructions:   Medication Instructions    amLODIPine (NORVASC) 5 mg tablet Take day of surgery.    losartan-hydrochlorothiazide (HYZAAR) 50-12.5 mg per tablet Hold day of surgery.    metoprolol succinate (TOPROL-XL) 100 mg 24 hr tablet Take day of surgery.    sildenafil (VIAGRA) 50 MG tablet Hold day of surgery.   Medication instructions for day surgery reviewed. Please use only a sip of water to take your instructed medications. Avoid all over the counter vitamins, supplements and NSAIDS for one week prior to surgery per anesthesia guidelines. Tylenol is ok to take as needed.     You will receive a call one business day prior to surgery with an arrival time and hospital directions. If your surgery is scheduled on a Monday, the hospital will be calling you on the Friday prior to your surgery. If you have not heard from anyone by 8pm, please call the hospital supervisor through the hospital  at [phone]. ([place] [phone] or [place] [phone]).    Do not eat or drink anything after midnight the night before your surgery, including candy, mints, lifesavers, or chewing gum. Do not drink alcohol 24hrs before your surgery. Try not to smoke at least 24hrs before your surgery.       Follow the pre surgery showering instructions as listed in the “My Surgical Experience Booklet” or otherwise provided by your surgeon's office. Do not use a blade to shave the surgical area 1 week before surgery. It is okay to use a clean electric clippers up to 24 hours before surgery. Do not apply any lotions, creams, including makeup, cologne, deodorant, or perfumes after showering on the day of your surgery. Do not use dry shampoo, hair spray, hair gel, or any type of hair products.     No contact lenses, eye make-up, or artificial eyelashes. Remove nail polish, including gel polish, and any artificial, gel, or acrylic nails if possible. Remove all jewelry including rings and body piercing jewelry.     Wear  causal clothing that is easy to take on and off. Consider your type of surgery.    Keep any valuables, jewelry, piercings at home. Please bring any specially ordered equipment (sling, braces) if indicated.    Arrange for a responsible person to drive you to and from the hospital on the day of your surgery. Please confirm the visitor policy for the day of your procedure when you receive your phone call with an arrival time.     Call the surgeon's office with any new illnesses, exposures, or additional questions prior to surgery.    Please reference your “My Surgical Experience Booklet” for additional information to prepare for your upcoming surgery.    Aware of enema pre op as directed by surgeon's office.

## 2024-10-31 ENCOUNTER — DOCUMENTATION (OUTPATIENT)
Dept: ADMINISTRATIVE | Facility: OTHER | Age: 59
End: 2024-10-31

## 2024-10-31 ENCOUNTER — PREP FOR PROCEDURE (OUTPATIENT)
Age: 59
End: 2024-10-31

## 2024-10-31 NOTE — PROGRESS NOTES
Urgent Care BP  Received: 3 days ago  Mary Song RN  P Patient Reported Team         Blood pressure elevated  Appointment department: [facility] [place]  Appointment provider: SHAWNEE Rosas  Blood pressure  10/28/24 1229 137/92  10/28/24 1226 154/91    10/31/24 2:03 PM    Patient was called after the Urgent Care visit ; a message was left for the patient to return the call    Thank you.  Hilario Schmid MA  [facility]

## 2024-11-04 ENCOUNTER — ANESTHESIA (OUTPATIENT)
Dept: PERIOP | Facility: AMBULARY SURGERY CENTER | Age: 59
End: 2024-11-04
Payer: COMMERCIAL

## 2024-11-04 ENCOUNTER — HOSPITAL ENCOUNTER (OUTPATIENT)
Facility: AMBULARY SURGERY CENTER | Age: 59
Setting detail: OUTPATIENT SURGERY
Discharge: HOME | End: 2024-11-04
Attending: COLON & RECTAL SURGERY | Admitting: COLON & RECTAL SURGERY
Payer: COMMERCIAL

## 2024-11-04 VITALS
RESPIRATION RATE: 15 BRPM | HEART RATE: 59 BPM | SYSTOLIC BLOOD PRESSURE: 147 MMHG | DIASTOLIC BLOOD PRESSURE: 85 MMHG | OXYGEN SATURATION: 97 % | TEMPERATURE: 98 F | WEIGHT: 266 LBS | BODY MASS INDEX: 37.24 KG/M2 | HEIGHT: 71 IN

## 2024-11-04 DIAGNOSIS — K62.5 RECTAL BLEEDING: ICD-10-CM

## 2024-11-04 PROCEDURE — C9290 INJ, BUPIVACAINE LIPOSOME: HCPCS | Performed by: COLON & RECTAL SURGERY

## 2024-11-04 PROCEDURE — NC001 PR NO CHARGE: Performed by: COLON & RECTAL SURGERY

## 2024-11-04 PROCEDURE — 46260 REMOVE IN/EX HEM GROUPS 2+: CPT | Performed by: COLON & RECTAL SURGERY

## 2024-11-04 PROCEDURE — 88304 TISSUE EXAM BY PATHOLOGIST: CPT | Performed by: PATHOLOGY

## 2024-11-04 RX ORDER — BUPIVACAINE HYDROCHLORIDE 2.5 MG/ML
INJECTION, SOLUTION EPIDURAL; INFILTRATION; INTRACAUDAL AS NEEDED
Status: DISCONTINUED | OUTPATIENT
Start: 2024-11-04 | End: 2024-11-04 | Stop reason: HOSPADM

## 2024-11-04 RX ORDER — DEXAMETHASONE SODIUM PHOSPHATE 10 MG/ML
INJECTION, SOLUTION INTRAMUSCULAR; INTRAVENOUS AS NEEDED
Status: DISCONTINUED | OUTPATIENT
Start: 2024-11-04 | End: 2024-11-04

## 2024-11-04 RX ORDER — HYDROMORPHONE HCL/PF 1 MG/ML
SYRINGE (ML) INJECTION AS NEEDED
Status: DISCONTINUED | OUTPATIENT
Start: 2024-11-04 | End: 2024-11-04

## 2024-11-04 RX ORDER — OXYCODONE HYDROCHLORIDE 5 MG/1
5 TABLET ORAL EVERY 4 HOURS PRN
Status: DISCONTINUED | OUTPATIENT
Start: 2024-11-04 | End: 2024-11-04 | Stop reason: HOSPADM

## 2024-11-04 RX ORDER — ONDANSETRON 2 MG/ML
4 INJECTION INTRAMUSCULAR; INTRAVENOUS ONCE AS NEEDED
Status: DISCONTINUED | OUTPATIENT
Start: 2024-11-04 | End: 2024-11-04 | Stop reason: HOSPADM

## 2024-11-04 RX ORDER — MIDAZOLAM HYDROCHLORIDE 2 MG/2ML
INJECTION, SOLUTION INTRAMUSCULAR; INTRAVENOUS AS NEEDED
Status: DISCONTINUED | OUTPATIENT
Start: 2024-11-04 | End: 2024-11-04

## 2024-11-04 RX ORDER — FENTANYL CITRATE/PF 50 MCG/ML
50 SYRINGE (ML) INJECTION
Status: DISCONTINUED | OUTPATIENT
Start: 2024-11-04 | End: 2024-11-04 | Stop reason: HOSPADM

## 2024-11-04 RX ORDER — PROPOFOL 10 MG/ML
INJECTION, EMULSION INTRAVENOUS AS NEEDED
Status: DISCONTINUED | OUTPATIENT
Start: 2024-11-04 | End: 2024-11-04

## 2024-11-04 RX ORDER — SODIUM CHLORIDE, SODIUM LACTATE, POTASSIUM CHLORIDE, CALCIUM CHLORIDE 600; 310; 30; 20 MG/100ML; MG/100ML; MG/100ML; MG/100ML
50 INJECTION, SOLUTION INTRAVENOUS CONTINUOUS
Status: DISCONTINUED | OUTPATIENT
Start: 2024-11-04 | End: 2024-11-04 | Stop reason: HOSPADM

## 2024-11-04 RX ORDER — LIDOCAINE HYDROCHLORIDE 10 MG/ML
INJECTION, SOLUTION EPIDURAL; INFILTRATION; INTRACAUDAL; PERINEURAL AS NEEDED
Status: DISCONTINUED | OUTPATIENT
Start: 2024-11-04 | End: 2024-11-04

## 2024-11-04 RX ORDER — OXYCODONE HYDROCHLORIDE 5 MG/1
5 TABLET ORAL EVERY 4 HOURS PRN
Qty: 20 TABLET | Refills: 0 | Status: SHIPPED | OUTPATIENT
Start: 2024-11-04 | End: 2024-11-14

## 2024-11-04 RX ORDER — GLYCOPYRROLATE 0.2 MG/ML
INJECTION INTRAMUSCULAR; INTRAVENOUS AS NEEDED
Status: DISCONTINUED | OUTPATIENT
Start: 2024-11-04 | End: 2024-11-04

## 2024-11-04 RX ORDER — ACETAMINOPHEN 325 MG/1
975 TABLET ORAL ONCE
Status: COMPLETED | OUTPATIENT
Start: 2024-11-04 | End: 2024-11-04

## 2024-11-04 RX ORDER — ONDANSETRON 2 MG/ML
INJECTION INTRAMUSCULAR; INTRAVENOUS AS NEEDED
Status: DISCONTINUED | OUTPATIENT
Start: 2024-11-04 | End: 2024-11-04

## 2024-11-04 RX ORDER — HYDROMORPHONE HCL/PF 1 MG/ML
0.2 SYRINGE (ML) INJECTION
Status: DISCONTINUED | OUTPATIENT
Start: 2024-11-04 | End: 2024-11-04 | Stop reason: HOSPADM

## 2024-11-04 RX ORDER — ROCURONIUM BROMIDE 10 MG/ML
INJECTION, SOLUTION INTRAVENOUS AS NEEDED
Status: DISCONTINUED | OUTPATIENT
Start: 2024-11-04 | End: 2024-11-04

## 2024-11-04 RX ORDER — PROPOFOL 10 MG/ML
INJECTION, EMULSION INTRAVENOUS CONTINUOUS PRN
Status: DISCONTINUED | OUTPATIENT
Start: 2024-11-04 | End: 2024-11-04

## 2024-11-04 RX ORDER — SODIUM CHLORIDE, SODIUM LACTATE, POTASSIUM CHLORIDE, CALCIUM CHLORIDE 600; 310; 30; 20 MG/100ML; MG/100ML; MG/100ML; MG/100ML
INJECTION, SOLUTION INTRAVENOUS CONTINUOUS PRN
Status: DISCONTINUED | OUTPATIENT
Start: 2024-11-04 | End: 2024-11-04

## 2024-11-04 RX ORDER — SODIUM CHLORIDE, SODIUM LACTATE, POTASSIUM CHLORIDE, CALCIUM CHLORIDE 600; 310; 30; 20 MG/100ML; MG/100ML; MG/100ML; MG/100ML
125 INJECTION, SOLUTION INTRAVENOUS CONTINUOUS
Status: DISCONTINUED | OUTPATIENT
Start: 2024-11-04 | End: 2024-11-04 | Stop reason: HOSPADM

## 2024-11-04 RX ORDER — NEOSTIGMINE METHYLSULFATE 1 MG/ML
INJECTION INTRAVENOUS AS NEEDED
Status: DISCONTINUED | OUTPATIENT
Start: 2024-11-04 | End: 2024-11-04

## 2024-11-04 RX ORDER — FENTANYL CITRATE 50 UG/ML
INJECTION, SOLUTION INTRAMUSCULAR; INTRAVENOUS AS NEEDED
Status: DISCONTINUED | OUTPATIENT
Start: 2024-11-04 | End: 2024-11-04

## 2024-11-04 RX ORDER — MAGNESIUM HYDROXIDE 1200 MG/15ML
LIQUID ORAL AS NEEDED
Status: DISCONTINUED | OUTPATIENT
Start: 2024-11-04 | End: 2024-11-04 | Stop reason: HOSPADM

## 2024-11-04 RX ADMIN — DEXAMETHASONE SODIUM PHOSPHATE 10 MG: 10 INJECTION, SOLUTION INTRAMUSCULAR; INTRAVENOUS at 14:23

## 2024-11-04 RX ADMIN — FENTANYL CITRATE 100 MCG: 50 INJECTION INTRAMUSCULAR; INTRAVENOUS at 14:23

## 2024-11-04 RX ADMIN — MIDAZOLAM 2 MG: 1 INJECTION INTRAMUSCULAR; INTRAVENOUS at 14:19

## 2024-11-04 RX ADMIN — NEOSTIGMINE METHYLSULFATE 5 MG: 1 INJECTION INTRAVENOUS at 14:59

## 2024-11-04 RX ADMIN — ONDANSETRON 4 MG: 2 INJECTION INTRAMUSCULAR; INTRAVENOUS at 14:23

## 2024-11-04 RX ADMIN — ACETAMINOPHEN 975 MG: 325 TABLET ORAL at 14:10

## 2024-11-04 RX ADMIN — PROPOFOL 50 MCG/KG/MIN: 10 INJECTION, EMULSION INTRAVENOUS at 14:35

## 2024-11-04 RX ADMIN — PROPOFOL 200 MG: 10 INJECTION, EMULSION INTRAVENOUS at 14:23

## 2024-11-04 RX ADMIN — ROCURONIUM BROMIDE 50 MG: 10 INJECTION INTRAVENOUS at 14:23

## 2024-11-04 RX ADMIN — SUGAMMADEX 200 MG: 100 INJECTION, SOLUTION INTRAVENOUS at 15:10

## 2024-11-04 RX ADMIN — GLYCOPYRROLATE 1 MG: 0.2 INJECTION INTRAMUSCULAR; INTRAVENOUS at 14:59

## 2024-11-04 RX ADMIN — HYDROMORPHONE HYDROCHLORIDE 0.5 MG: 1 INJECTION, SOLUTION INTRAMUSCULAR; INTRAVENOUS; SUBCUTANEOUS at 14:51

## 2024-11-04 RX ADMIN — DEXMEDETOMIDINE 10 MCG: 100 INJECTION, SOLUTION INTRAVENOUS at 14:19

## 2024-11-04 RX ADMIN — LIDOCAINE HYDROCHLORIDE 50 MG: 10 INJECTION, SOLUTION EPIDURAL; INFILTRATION; INTRACAUDAL at 14:23

## 2024-11-04 RX ADMIN — SODIUM CHLORIDE, SODIUM LACTATE, POTASSIUM CHLORIDE, AND CALCIUM CHLORIDE: .6; .31; .03; .02 INJECTION, SOLUTION INTRAVENOUS at 14:16

## 2024-11-04 NOTE — ANESTHESIA POSTPROCEDURE EVALUATION
Post-Op Assessment Note    CV Status:  Stable  Pain Score: 0         Mental Status:  Awake and alert   Hydration Status:  Stable   PONV Controlled:  None   Airway Patency:  Patent  Two or more mitigation strategies used for obstructive sleep apnea   Post Op Vitals Reviewed: Yes    No anethesia notable event occurred.    Staff: Anesthesiologist           Last Filed PACU Vitals:  Vitals Value Taken Time   Temp     Pulse 75 11/04/24 1514   /84 11/04/24 1514   Resp 26 11/04/24 1514   SpO2 97 % 11/04/24 1514   Vitals shown include unfiled device data.    Modified Jaqueline:  Activity: 2 (11/4/2024  4:16 PM)  Respiration: 2 (11/4/2024  4:16 PM)  Circulation: 2 (11/4/2024  4:16 PM)  Consciousness: 2 (11/4/2024  4:16 PM)  Oxygen Saturation: 2 (11/4/2024  4:16 PM)  Modified Jaqueline Score: 10 (11/4/2024  4:16 PM)

## 2024-11-04 NOTE — ANESTHESIA PREPROCEDURE EVALUATION
"Procedure:  EXAM UNDER ANESTHESIA (EUA); HEMORRHOIDECTOMY EXCISION (Anus)    Relevant Problems   CARDIO   (+) Essential hypertension      Hemorrhoids   Obesity, BMI 38    Lab Results   Component Value Date    WBC 5.16 04/26/2023    HGB 14.8 04/26/2023    HCT 41.9 04/26/2023    MCV 93 04/26/2023     04/26/2023     Lab Results   Component Value Date    SODIUM 138 04/26/2023    K 4.0 04/26/2023     04/26/2023    CO2 26 04/26/2023    BUN 14 04/26/2023    CREATININE 1.00 04/26/2023    GLUC 125 (H) 02/15/2022    CALCIUM 9.4 04/26/2023     No results found for: \"INR\", \"PROTIME\"  Lab Results   Component Value Date    HGBA1C 6.1 05/12/2023            Physical Exam    Airway    Mallampati score: II  TM Distance: >3 FB  Neck ROM: full     Dental   No notable dental hx     Cardiovascular      Pulmonary      Other Findings        Anesthesia Plan  ASA Score- 2     Anesthesia Type- general with ASA Monitors.         Additional Monitors:     Airway Plan: ETT.           Plan Factors-Exercise tolerance (METS): >4 METS.    Chart reviewed.    Patient summary reviewed.    Patient is not a current smoker.      Obstructive sleep apnea risk education given perioperatively.        Induction- intravenous.    Postoperative Plan-     Perioperative Resuscitation Plan - Level 1 - Full Code.       Informed Consent- Anesthetic plan and risks discussed with patient.  I personally reviewed this patient with the CRNA. Discussed and agreed on the Anesthesia Plan with the CRNA..        "

## 2024-11-04 NOTE — DISCHARGE INSTR - AVS FIRST PAGE
May shower or tub bathe beginning today.  Ensure you are not getting constipated using Metamucil 1 tablespoon 1-2 times daily with plenty of hydration.   Pain medication as prescribed for pain may be combined with Tylenol. May use ibuprofen as well.   Call for worsening pain, heavy bleeding, inability to urinate or fever greater than 101.5  4 weeks follow-up Dr. Luis M Xiao [phone]

## 2024-11-04 NOTE — OP NOTE
OPERATIVE REPORT  PATIENT NAME: Anibal Voss    :  1965  MRN: [id number]  Pt Location: [facility] OR ROOM 06    SURGERY DATE: 2024    Surgeons and Role:     * Luis M Xiao MD - Primary     * Mariposa Harris MD - Assisting    Preop Diagnosis:  Rectal bleeding [K62.5]    Post-Op Diagnosis Codes:     * Rectal bleeding [K62.5]    Procedure(s):  EXAM UNDER ANESTHESIA (EUA); HEMORRHOIDECTOMY EXCISION INTERNAL AND EXTERNAL X1    Specimen(s):  ID Type Source Tests Collected by Time Destination   1 :  Tissue Hemorrhoids TISSUE EXAM Luis M Xiao MD 2024 1440        Estimated Blood Loss:   Minimal    Drains:  * No LDAs found *    Anesthesia Type:   General    Operative Indications:  Rectal bleeding [K62.5]      Operative Findings:  Large grade 3 internal/external hemorrhoidal column in the right anterior lateral/right lateral position.  Small grade 2 internal hemorrhoid right posterior lateral and left lateral.  Minimal prolapsing tissue at these locations.  No other significant anal pathology.      Complications:   None    Procedure and Technique:  After preoperative identification in the preoperative holding area, the patient was taken the operating room placed in the supine position. Following introduction of general anesthesia the patient was in place in the prone jackknife position with buttocks taped apart. Patient was prepped and draped in usual sterile fashion. Timeout was undertaken and procedure begun.    Examination was begun.  In the right anterior lateral quadrant extending to the right lateral quadrant, there is a grade 3 prolapsing internal/external hemorrhoidal column with prolapsing mucosa distal to the dentate line.  Right posterior lateral and left lateral position send minimal grade 2 internal hemorrhoids.  No significant prolapsing tissue was noted external to the anus.  There was some mucosal bleeding from the surface at these locations but no significant prolapse or any  external component.  As such we prepared for hemorrhoidectomy.  In the right anterior lateral position.  V shaped incision was made over the right anterior and right lateral positions.  External anal sphincter was dissected free off of the perianal skin.  Submucosal plane was entered and dissection was taken to the apex of the hemorrhoidal pedicle.  0 Vicryl was used to secure this.  Crushing clamp was placed and excess tissue was removed.  3-0 Vicryl was used to close the mucosal defect in a running locking fashion.  0 Vicryl was used to reinforce any bleeding sites.  With this the right posterior lateral component was largely obliterated along with the right anterior lateral component.  A small amount of bleeding mucosa was noted in the left lateral as well as right posterior lateral positions.  These were treated with Bovie cautery.  Anus was then inspected for hemostasis.  This noted to be excellent.  Gelfoam was placed within the anal canal.  Local field block was placed using Exparel and bupivacaine.  Patient was transferred to recovery room in stable condition.     I was present for the entire procedure.    Patient Disposition:  PACU              SIGNATURE: Luis M Xiao MD  DATE: November 4, 2024  TIME: 2:57 PM

## 2024-11-04 NOTE — H&P
History and Physical   Colon and Rectal Surgery   Anibal Voss 59 y.o. male MRN: [id number]  Unit/Bed#: [facility] Encounter: [id number]  24   @NOW    No chief complaint on file.        History of Present Illness   HPI:  Anibal Voss is a 59 y.o. male who presents for surgical treatment of symptomatic hemorrhoids.      Historical Information   Past Medical History:   Diagnosis Date    Hypertension     Inner ear dysfunction     Irreducible umbilical hernia     last assessed 2013    Pneumonia      Past Surgical History:   Procedure Laterality Date    COLONOSCOPY N/A 2016    Procedure: COLONOSCOPY;  Surgeon: Luis M Xiao MD;  Location: [facility];  Service:     HERNIA REPAIR      umbilical    SHOULDER SURGERY Right        Meds/Allergies       Medications Prior to Admission:     amLODIPine (NORVASC) 5 mg tablet    losartan-hydrochlorothiazide (HYZAAR) 50-12.5 mg per tablet    metoprolol succinate (TOPROL-XL) 100 mg 24 hr tablet    sildenafil (VIAGRA) 50 MG tablet    No current facility-administered medications for this encounter.    No Known Allergies      Social History   Social History     Substance and Sexual Activity   Alcohol Use Yes    Alcohol/week: 30.0 standard drinks of alcohol    Types: 30 Cans of beer per week    Comment: being a social drinker, per [employer]     Social History     Substance and Sexual Activity   Drug Use Yes    Types: Marijuana    Comment: rare     Social History     Tobacco Use   Smoking Status Former    Current packs/day: 0.00    Average packs/day: 1 pack/day for 15.0 years (15.0 ttl pk-yrs)    Types: Cigarettes    Start date:     Quit date:     Years since quittin.8   Smokeless Tobacco Never   Tobacco Comments    former smoker, per [employer]         Family History:   Family History   Adopted: Yes   Family history unknown: Yes         Objective     Current Vitals:      No intake or output data in the 24 hours ending 24 1344    Physical Exam:  General:   Resting comfortably in bed   Eyes:Sclera anicteric  ENT: Trachea midline  Pulm:  Symmetric chest raise.  No respiratory Distress  CV:  Regular on monitor  Abdomen:  Soft NT ND  Extremities:  No clubbing/ cyanosis/ edema    Lab Results: I have personally reviewed pertinent lab results.    Imaging: Results Review Statement: No pertinent imaging studies reviewed.      ASSESSMENT:  Anibal Voss is a 59 y.o. male who presents for surgical treatment of symptomatic hemorrhoids.  Plan is for excisional hemorrhoidectomy.  Risks of anal surgery, including but not limited to pain, bleeding, failure to heal properly, fecal incontinence, persistent or recurrent anal disease, infection, fistula, abscess were reviewed. Questions were answered.

## 2024-11-07 PROCEDURE — 88304 TISSUE EXAM BY PATHOLOGIST: CPT | Performed by: PATHOLOGY

## 2024-11-19 ENCOUNTER — OFFICE VISIT (OUTPATIENT)
Dept: FAMILY MEDICINE CLINIC | Facility: CLINIC | Age: 59
End: 2024-11-19
Payer: COMMERCIAL

## 2024-11-19 ENCOUNTER — APPOINTMENT (OUTPATIENT)
Dept: LAB | Facility: CLINIC | Age: 59
End: 2024-11-19
Payer: COMMERCIAL

## 2024-11-19 VITALS
WEIGHT: 269 LBS | BODY MASS INDEX: 37.66 KG/M2 | OXYGEN SATURATION: 96 % | HEART RATE: 84 BPM | SYSTOLIC BLOOD PRESSURE: 150 MMHG | HEIGHT: 71 IN | DIASTOLIC BLOOD PRESSURE: 90 MMHG | TEMPERATURE: 97.5 F

## 2024-11-19 DIAGNOSIS — N52.9 IMPOTENCE DUE TO ERECTILE DYSFUNCTION: ICD-10-CM

## 2024-11-19 DIAGNOSIS — I49.8 BIGEMINY: ICD-10-CM

## 2024-11-19 DIAGNOSIS — E78.01 FAMILIAL HYPERCHOLESTEROLEMIA: ICD-10-CM

## 2024-11-19 DIAGNOSIS — Z13.1 SCREENING FOR DIABETES MELLITUS: ICD-10-CM

## 2024-11-19 DIAGNOSIS — Z00.00 ANNUAL PHYSICAL EXAM: Primary | ICD-10-CM

## 2024-11-19 DIAGNOSIS — Z12.5 SCREENING FOR PROSTATE CANCER: ICD-10-CM

## 2024-11-19 DIAGNOSIS — I10 ESSENTIAL HYPERTENSION: ICD-10-CM

## 2024-11-19 LAB
ALBUMIN SERPL BCG-MCNC: 4.1 G/DL (ref 3.5–5)
ALP SERPL-CCNC: 35 U/L (ref 34–104)
ALT SERPL W P-5'-P-CCNC: 29 U/L (ref 7–52)
ANION GAP SERPL CALCULATED.3IONS-SCNC: 10 MMOL/L (ref 4–13)
AST SERPL W P-5'-P-CCNC: 27 U/L (ref 13–39)
BASOPHILS # BLD AUTO: 0.04 THOUSANDS/ÂΜL (ref 0–0.1)
BASOPHILS NFR BLD AUTO: 1 % (ref 0–1)
BILIRUB SERPL-MCNC: 0.78 MG/DL (ref 0.2–1)
BUN SERPL-MCNC: 12 MG/DL (ref 5–25)
CALCIUM SERPL-MCNC: 9.3 MG/DL (ref 8.4–10.2)
CHLORIDE SERPL-SCNC: 100 MMOL/L (ref 96–108)
CHOLEST SERPL-MCNC: 188 MG/DL (ref ?–200)
CO2 SERPL-SCNC: 26 MMOL/L (ref 21–32)
CREAT SERPL-MCNC: 0.88 MG/DL (ref 0.6–1.3)
EOSINOPHIL # BLD AUTO: 0.23 THOUSAND/ÂΜL (ref 0–0.61)
EOSINOPHIL NFR BLD AUTO: 4 % (ref 0–6)
ERYTHROCYTE [DISTWIDTH] IN BLOOD BY AUTOMATED COUNT: 12.5 % (ref 11.6–15.1)
EST. AVERAGE GLUCOSE BLD GHB EST-MCNC: 143 MG/DL
GFR SERPL CREATININE-BSD FRML MDRD: 94 ML/MIN/1.73SQ M
GLUCOSE P FAST SERPL-MCNC: 141 MG/DL (ref 65–99)
HBA1C MFR BLD: 6.6 %
HCT VFR BLD AUTO: 41.9 % (ref 36.5–49.3)
HDLC SERPL-MCNC: 50 MG/DL
HGB BLD-MCNC: 14.7 G/DL (ref 12–17)
IMM GRANULOCYTES # BLD AUTO: 0.01 THOUSAND/UL (ref 0–0.2)
IMM GRANULOCYTES NFR BLD AUTO: 0 % (ref 0–2)
LDLC SERPL CALC-MCNC: 95 MG/DL (ref 0–100)
LYMPHOCYTES # BLD AUTO: 1.83 THOUSANDS/ÂΜL (ref 0.6–4.47)
LYMPHOCYTES NFR BLD AUTO: 33 % (ref 14–44)
MCH RBC QN AUTO: 32.1 PG (ref 26.8–34.3)
MCHC RBC AUTO-ENTMCNC: 35.1 G/DL (ref 31.4–37.4)
MCV RBC AUTO: 92 FL (ref 82–98)
MONOCYTES # BLD AUTO: 0.71 THOUSAND/ÂΜL (ref 0.17–1.22)
MONOCYTES NFR BLD AUTO: 13 % (ref 4–12)
NEUTROPHILS # BLD AUTO: 2.69 THOUSANDS/ÂΜL (ref 1.85–7.62)
NEUTS SEG NFR BLD AUTO: 49 % (ref 43–75)
NRBC BLD AUTO-RTO: 0 /100 WBCS
PLATELET # BLD AUTO: 202 THOUSANDS/UL (ref 149–390)
PMV BLD AUTO: 9 FL (ref 8.9–12.7)
POTASSIUM SERPL-SCNC: 4.3 MMOL/L (ref 3.5–5.3)
PROT SERPL-MCNC: 7.4 G/DL (ref 6.4–8.4)
PSA SERPL-MCNC: 2.8 NG/ML (ref 0–4)
RBC # BLD AUTO: 4.58 MILLION/UL (ref 3.88–5.62)
SODIUM SERPL-SCNC: 136 MMOL/L (ref 135–147)
TRIGL SERPL-MCNC: 215 MG/DL (ref ?–150)
WBC # BLD AUTO: 5.51 THOUSAND/UL (ref 4.31–10.16)

## 2024-11-19 PROCEDURE — G0103 PSA SCREENING: HCPCS

## 2024-11-19 PROCEDURE — 36415 COLL VENOUS BLD VENIPUNCTURE: CPT

## 2024-11-19 PROCEDURE — 83036 HEMOGLOBIN GLYCOSYLATED A1C: CPT

## 2024-11-19 PROCEDURE — 85025 COMPLETE CBC W/AUTO DIFF WBC: CPT

## 2024-11-19 PROCEDURE — 99396 PREV VISIT EST AGE 40-64: CPT | Performed by: NURSE PRACTITIONER

## 2024-11-19 PROCEDURE — 80061 LIPID PANEL: CPT

## 2024-11-19 PROCEDURE — 80053 COMPREHEN METABOLIC PANEL: CPT

## 2024-11-19 RX ORDER — SILDENAFIL 50 MG/1
50 TABLET, FILM COATED ORAL AS NEEDED
Qty: 30 TABLET | Refills: 1 | Status: SHIPPED | OUTPATIENT
Start: 2024-11-19

## 2024-11-19 RX ORDER — AMLODIPINE BESYLATE 5 MG/1
5 TABLET ORAL DAILY
Qty: 30 TABLET | Refills: 11 | Status: SHIPPED | OUTPATIENT
Start: 2024-11-19 | End: 2024-11-19 | Stop reason: SDUPTHER

## 2024-11-19 RX ORDER — METOPROLOL SUCCINATE 100 MG/1
100 TABLET, EXTENDED RELEASE ORAL DAILY
Qty: 30 TABLET | Refills: 11 | Status: SHIPPED | OUTPATIENT
Start: 2024-11-19

## 2024-11-19 RX ORDER — LOSARTAN POTASSIUM AND HYDROCHLOROTHIAZIDE 12.5; 5 MG/1; MG/1
1 TABLET ORAL DAILY
Qty: 30 TABLET | Refills: 11 | Status: SHIPPED | OUTPATIENT
Start: 2024-11-19

## 2024-11-19 RX ORDER — AMLODIPINE BESYLATE 10 MG/1
10 TABLET ORAL DAILY
Qty: 30 TABLET | Refills: 11 | Status: SHIPPED | OUTPATIENT
Start: 2024-11-19

## 2024-11-19 NOTE — PATIENT INSTRUCTIONS
"Patient Education     Routine physical for adults   The Basics   Written by the doctors and editors at [facility]   What is a physical? -- A physical is a routine visit, or \"check-up,\" with your doctor. You might also hear it called a \"wellness visit\" or \"preventive visit.\"  During each visit, the doctor will:   Ask about your physical and mental health   Ask about your habits, behaviors, and lifestyle   Do an exam   Give you vaccines if needed   Talk to you about any medicines you take   Give advice about your health   Answer your questions  Getting regular check-ups is an important part of taking care of your health. It can help your doctor find and treat any problems you have. But it's also important for preventing health problems.  A routine physical is different from a \"sick visit.\" A sick visit is when you see a doctor because of a health concern or problem. Since physicals are scheduled ahead of time, you can think about what you want to ask the doctor.  How often should I get a physical? -- It depends on your age and health. In general, for people age 21 years and older:   If you are younger than 50 years, you might be able to get a physical every 3 years.   If you are 50 years or older, your doctor might recommend a physical every year.  If you have an ongoing health condition, like diabetes or high blood pressure, your doctor will probably want to see you more often.  What happens during a physical? -- In general, each visit will include:   Physical exam - The doctor or nurse will check your height, weight, heart rate, and blood pressure. They will also look at your eyes and ears. They will ask about how you are feeling and whether you have any symptoms that bother you.   Medicines - It's a good idea to bring a list of all the medicines you take to each doctor visit. Your doctor will talk to you about your medicines and answer any questions. Tell them if you are having any side effects that bother you. You " "should also tell them if you are having trouble paying for any of your medicines.   Habits and behaviors - This includes:   Your diet   Your exercise habits   Whether you smoke, drink alcohol, or use drugs   Whether you are sexually active   Whether you feel safe at home  Your doctor will talk to you about things you can do to improve your health and lower your risk of health problems. They will also offer help and support. For example, if you want to quit smoking, they can give you advice and might prescribe medicines. If you want to improve your diet or get more physical activity, they can help you with this, too.   Lab tests, if needed - The tests you get will depend on your age and situation. For example, your doctor might want to check your:   Cholesterol   Blood sugar   Iron level   Vaccines - The recommended vaccines will depend on your age, health, and what vaccines you already had. Vaccines are very important because they can prevent certain serious or deadly infections.   Discussion of screening - \"Screening\" means checking for diseases or other health problems before they cause symptoms. Your doctor can recommend screening based on your age, risk, and preferences. This might include tests to check for:   Cancer, such as breast, prostate, cervical, ovarian, colorectal, prostate, lung, or skin cancer   Sexually transmitted infections, such as chlamydia and gonorrhea   Mental health conditions like depression and anxiety  Your doctor will talk to you about the different types of screening tests. They can help you decide which screenings to have. They can also explain what the results might mean.   Answering questions - The physical is a good time to ask the doctor or nurse questions about your health. If needed, they can refer you to other doctors or specialists, too.  Adults older than 65 years often need other care, too. As you get older, your doctor will talk to you about:   How to prevent falling at " home   Hearing or vision tests   Memory testing   How to take your medicines safely   Making sure that you have the help and support you need at home  All topics are updated as new evidence becomes available and our peer review process is complete.  This topic retrieved from [employer] on: May 02, 2024.  Topic 797761 Version 1.0  Release: 32.4.3 - C32.122  © 2024 UpToDate, Inc. and/or its affiliates. All rights reserved.  Consumer Information Use and Disclaimer   Disclaimer: This generalized information is a limited summary of diagnosis, treatment, and/or medication information. It is not meant to be comprehensive and should be used as a tool to help the user understand and/or assess potential diagnostic and treatment options. It does NOT include all information about conditions, treatments, medications, side effects, or risks that may apply to a specific patient. It is not intended to be medical advice or a substitute for the medical advice, diagnosis, or treatment of a health care provider based on the health care provider's examination and assessment of a patient's specific and unique circumstances. Patients must speak with a health care provider for complete information about their health, medical questions, and treatment options, including any risks or benefits regarding use of medications. This information does not endorse any treatments or medications as safe, effective, or approved for treating a specific patient. UpToDate, Inc. and its affiliates disclaim any warranty or liability relating to this information or the use thereof.The use of this information is governed by the Terms of Use, available at https://www.woltersCoupanguwer.com/en/know/clinical-effectiveness-terms. 2024© UpToDate, Inc. and its affiliates and/or licensors. All rights reserved.  Copyright   © 2024 UpToDate, Inc. and/or its affiliates. All rights reserved.

## 2024-11-19 NOTE — ASSESSMENT & PLAN NOTE
Orders:    sildenafil (VIAGRA) 50 MG tablet; Take 1 tablet (50 mg total) by mouth as needed for erectile dysfunction

## 2024-11-19 NOTE — ASSESSMENT & PLAN NOTE
Orders:    Lipid Panel with Direct LDL reflex; Future    CBC and differential; Future    Comprehensive metabolic panel; Future

## 2024-11-19 NOTE — PROGRESS NOTES
Adult Annual Physical  Name: Anibal Voss      : 1965      MRN: [id number]  Encounter Provider: SHAWNEE Stewart  Encounter Date: 2024   Encounter department: [facility]    Assessment & Plan  Annual physical exam         Screening for prostate cancer    Orders:    PSA, Total Screen; Future    Screening for diabetes mellitus    Orders:    Hemoglobin A1C; Future    Essential hypertension  DW patient will increase amlodipine to 10 mg and continue with metoprolol 100 mg and losartan/HCTZ 50-12.5 mg     Orders:    CBC and differential; Future    Comprehensive metabolic panel; Future    losartan-hydrochlorothiazide (HYZAAR) 50-12.5 mg per tablet; Take 1 tablet by mouth daily    metoprolol succinate (TOPROL-XL) 100 mg 24 hr tablet; Take 1 tablet (100 mg total) by mouth daily    amLODIPine (NORVASC) 10 mg tablet; Take 1 tablet (10 mg total) by mouth daily    Familial hypercholesterolemia    Orders:    Lipid Panel with Direct LDL reflex; Future    CBC and differential; Future    Comprehensive metabolic panel; Future    BMI 37.0-37.9, adult         Bigeminy    Orders:    metoprolol succinate (TOPROL-XL) 100 mg 24 hr tablet; Take 1 tablet (100 mg total) by mouth daily    Impotence due to erectile dysfunction    Orders:    sildenafil (VIAGRA) 50 MG tablet; Take 1 tablet (50 mg total) by mouth as needed for erectile dysfunction    Immunizations and preventive care screenings were discussed with patient today. Appropriate education was printed on patient's after visit summary.    Discussed risks and benefits of prostate cancer screening. We discussed the controversial history of PSA screening for prostate cancer in the [place] as well as the risk of over detection and over treatment of prostate cancer by way of PSA screening.  The patient understands that PSA blood testing is an imperfect way to screen for prostate cancer and that elevated PSA levels in the blood may also be  caused by infection, inflammation, prostatic trauma or manipulation, urological procedures, or by benign prostatic enlargement.    The role of the digital rectal examination in prostate cancer screening was also discussed and I discussed with him that there is large interobserver variability in the findings of digital rectal examination.    Counseling:  Dental Health: discussed importance of regular tooth brushing, flossing, and dental visits.  Injury prevention: discussed safety/seat belts, safety helmets, smoke detectors, carbon monoxide detectors, and smoking near bedding or upholstery.      Depression Screening and Follow-up Plan: Patient was screened for depression during today's encounter. They screened negative with a PHQ-2 score of 0.        History of Present Illness     Adult Annual Physical:  Patient presents for annual physical. Patient here today for his annual check up and has no new issues to report and has recently had hemorrhoid surgery and is recovery well .     Diet and Physical Activity:  - Diet/Nutrition: well balanced diet.  - Exercise: walking.    Depression Screening:  - PHQ-2 Score: 0    General Health:  - Sleep: sleeps well.  - Hearing: normal hearing bilateral ears.  - Vision: goes for regular eye exams, most recent eye exam < 1 year ago and wears glasses.  - Dental: regular dental visits.     Health:    - Urinary symptoms: none.     Review of Systems   Constitutional:  Negative for activity change, appetite change, chills, diaphoresis, fatigue, fever and unexpected weight change.   HENT:  Negative for congestion, ear pain, hearing loss, postnasal drip, sinus pressure, sinus pain, sneezing and sore throat.    Eyes:  Negative for pain, redness and visual disturbance.   Respiratory:  Negative for cough and shortness of breath.    Cardiovascular:  Negative for chest pain and leg swelling.   Gastrointestinal:  Negative for abdominal pain, diarrhea, nausea and vomiting.   Endocrine: Negative.  "   Genitourinary: Negative.    Musculoskeletal:  Negative for arthralgias.   Allergic/Immunologic: Negative.    Neurological:  Negative for dizziness and light-headedness.   Hematological: Negative.    Psychiatric/Behavioral:  Negative for behavioral problems and dysphoric mood.    Objective   /90   Pulse 84   Temp 97.5 °F (36.4 °C)   Ht 5' 11\" (1.803 m)   Wt 122 kg (269 lb)   SpO2 96%   BMI 37.52 kg/m²     Physical Exam  Vitals and nursing note reviewed.   Constitutional:       General: He is not in acute distress.     Appearance: He is well-developed.   HENT:      Head: Normocephalic and atraumatic.   Eyes:      Pupils: Pupils are equal, round, and reactive to light.   Neck:      Thyroid: No thyromegaly.   Cardiovascular:      Rate and Rhythm: Normal rate and regular rhythm.      Heart sounds: Normal heart sounds. No murmur heard.  Pulmonary:      Effort: Pulmonary effort is normal. No respiratory distress.      Breath sounds: Normal breath sounds. No wheezing.   Abdominal:      General: Bowel sounds are normal.      Palpations: Abdomen is soft.   Musculoskeletal:         General: Normal range of motion.      Cervical back: Normal range of motion.   Skin:     General: Skin is warm and dry.   Neurological:      Mental Status: He is alert and oriented to person, place, and time.         "

## 2024-11-19 NOTE — ASSESSMENT & PLAN NOTE
DW patient will increase amlodipine to 10 mg and continue with metoprolol 100 mg and losartan/HCTZ 50-12.5 mg     Orders:    CBC and differential; Future    Comprehensive metabolic panel; Future    losartan-hydrochlorothiazide (HYZAAR) 50-12.5 mg per tablet; Take 1 tablet by mouth daily    metoprolol succinate (TOPROL-XL) 100 mg 24 hr tablet; Take 1 tablet (100 mg total) by mouth daily    amLODIPine (NORVASC) 10 mg tablet; Take 1 tablet (10 mg total) by mouth daily

## 2024-11-20 ENCOUNTER — RESULTS FOLLOW-UP (OUTPATIENT)
Dept: FAMILY MEDICINE CLINIC | Facility: CLINIC | Age: 59
End: 2024-11-20

## 2024-11-20 DIAGNOSIS — R73.9 ELEVATED BLOOD SUGAR: Primary | ICD-10-CM

## 2024-12-09 NOTE — PROGRESS NOTES
There are no diagnoses linked to this encounter.   No problem-specific Assessment & Plan notes found for this encounter.      HPI    Anibal Voss is a 59 y.o. male here today for a postopertive visit for EUA for hemorrhoidectomy excision internal and external. 11/4/24                                               Tissue exam:  Final Diagnosis A. Anus, Hemorrhoidectomy: - Anorectal mucosa with dilated, ectatic, and congested vessels, consistent with hemorrhoids.  Last colonoscopy done 4/29/22  with a 5 year recall.  Past Medical History:   Diagnosis Date    Hypertension     Inner ear dysfunction     Irreducible umbilical hernia     last assessed 21Oct2013    Pneumonia      Past Surgical History:   Procedure Laterality Date    COLONOSCOPY N/A 9/16/2016    Procedure: COLONOSCOPY;  Surgeon: Luis M Xiao MD;  Location: [facility];  Service:     HERNIA REPAIR      umbilical    ND ANRCT XM SURG REQ ANES GENERAL SPI/EDRL DX N/A 11/4/2024    Procedure: EXAM UNDER ANESTHESIA (EUA); HEMORRHOIDECTOMY EXCISION INTERNAL AND EXTERNAL X1;  Surgeon: Luis M Xiao MD;  Location: [facility];  Service: Colorectal    SHOULDER SURGERY Right        Current Outpatient Medications:     amLODIPine (NORVASC) 10 mg tablet, Take 1 tablet (10 mg total) by mouth daily, Disp: 30 tablet, Rfl: 11    losartan-hydrochlorothiazide (HYZAAR) 50-12.5 mg per tablet, Take 1 tablet by mouth daily, Disp: 30 tablet, Rfl: 11    metoprolol succinate (TOPROL-XL) 100 mg 24 hr tablet, Take 1 tablet (100 mg total) by mouth daily, Disp: 30 tablet, Rfl: 11    sildenafil (VIAGRA) 50 MG tablet, Take 1 tablet (50 mg total) by mouth as needed for erectile dysfunction, Disp: 30 tablet, Rfl: 1  Allergies as of 12/12/2024    (No Known Allergies)     Review of Systems  There were no vitals filed for this visit.  Physical Exam

## 2024-12-11 NOTE — PROGRESS NOTES
Diagnoses and all orders for this visit:    Grade III hemorrhoids       Grade III hemorrhoids  Patient presents status post excisional hemorrhoidectomy.  He is doing well without complaints.  Examination shows his wound is largely healed save for a small 2 mm area at the anal verge which is yet to completely epithelialize.  I recommend only observation.  He will call if concerns.      HPI    Anibal Voss is a 59 y.o. male who presents for a postoperative evaluation.     The patient is status post EUA, internal and external x1 hemorrhoidectomy excision on 11/4/24.    Operative Findings:  Large grade 3 internal/external hemorrhoidal column in the right anterior lateral/right lateral position.  Small grade 2 internal hemorrhoid right posterior lateral and left lateral.  Minimal prolapsing tissue at these locations.  No other significant anal pathology.    Final Diagnosis:  A. Anus, Hemorrhoidectomy:  - Anorectal mucosa with dilated, ectatic, and congested vessels, consistent with hemorrhoids.    He states that he is doing well.   He denies pain, discomfort, and bleeding at this time.     He is having daily bowel movements.    Lab Results   Component Value Date    WBC 5.51 11/19/2024    HGB 14.7 11/19/2024    HCT 41.9 11/19/2024    MCV 92 11/19/2024     11/19/2024     Lab Results   Component Value Date    SODIUM 136 11/19/2024    K 4.3 11/19/2024     11/19/2024    CO2 26 11/19/2024    AGAP 10 11/19/2024    BUN 12 11/19/2024    CREATININE 0.88 11/19/2024    GLUF 141 (H) 11/19/2024    CALCIUM 9.3 11/19/2024    AST 27 11/19/2024    ALT 29 11/19/2024    ALKPHOS 35 11/19/2024    TP 7.4 11/19/2024    TBILI 0.78 11/19/2024    EGFR 94 11/19/2024     Past Medical History:   Diagnosis Date    Bigeminy     Hypertension     Inner ear dysfunction     Irreducible umbilical hernia     last assessed 21Oct2013    Pneumonia     Ventricular tachyarrhythmia (HCC)      Past Surgical History:   Procedure Laterality Date     COLONOSCOPY N/A 9/16/2016    Procedure: COLONOSCOPY;  Surgeon: Luis M Xiao MD;  [facility]: [facility];  Service:     HERNIA REPAIR      umbilical    NH ANRCT XM SURG REQ ANES GENERAL SPI/EDRL DX N/A 11/4/2024    Procedure: EXAM UNDER ANESTHESIA (EUA); HEMORRHOIDECTOMY EXCISION INTERNAL AND EXTERNAL X1;  Surgeon: Luis M Xiao MD;  Location: [facility];  Service: Colorectal    SHOULDER SURGERY Right        Current Outpatient Medications:     amLODIPine (NORVASC) 10 mg tablet, Take 1 tablet (10 mg total) by mouth daily, Disp: 30 tablet, Rfl: 11    losartan-hydrochlorothiazide (HYZAAR) 50-12.5 mg per tablet, Take 1 tablet by mouth daily, Disp: 30 tablet, Rfl: 11    metoprolol succinate (TOPROL-XL) 100 mg 24 hr tablet, Take 1 tablet (100 mg total) by mouth daily, Disp: 30 tablet, Rfl: 11    sildenafil (VIAGRA) 50 MG tablet, Take 1 tablet (50 mg total) by mouth as needed for erectile dysfunction, Disp: 30 tablet, Rfl: 1  Allergies as of 12/12/2024    (No Known Allergies)     Review of Systems  Vitals:    12/12/24 1124   BP: 136/80     Physical Exam  Genitourinary:     Comments: Almost completely healed perianal incision

## 2024-12-12 ENCOUNTER — OFFICE VISIT (OUTPATIENT)
Age: 59
End: 2024-12-12

## 2024-12-12 VITALS
DIASTOLIC BLOOD PRESSURE: 80 MMHG | HEIGHT: 71 IN | SYSTOLIC BLOOD PRESSURE: 136 MMHG | BODY MASS INDEX: 38.92 KG/M2 | WEIGHT: 278 LBS

## 2024-12-12 DIAGNOSIS — K64.2 GRADE III HEMORRHOIDS: Primary | ICD-10-CM

## 2024-12-12 PROCEDURE — 99024 POSTOP FOLLOW-UP VISIT: CPT | Performed by: COLON & RECTAL SURGERY

## 2024-12-12 NOTE — ASSESSMENT & PLAN NOTE
Patient presents status post excisional hemorrhoidectomy.  He is doing well without complaints.  Examination shows his wound is largely healed save for a small 2 mm area at the anal verge which is yet to completely epithelialize.  I recommend only observation.  He will call if concerns.

## 2024-12-18 ENCOUNTER — OFFICE VISIT (OUTPATIENT)
Dept: CARDIOLOGY CLINIC | Facility: CLINIC | Age: 59
End: 2024-12-18
Payer: COMMERCIAL

## 2024-12-18 VITALS
HEART RATE: 76 BPM | BODY MASS INDEX: 38.78 KG/M2 | SYSTOLIC BLOOD PRESSURE: 140 MMHG | HEIGHT: 71 IN | WEIGHT: 277 LBS | DIASTOLIC BLOOD PRESSURE: 84 MMHG

## 2024-12-18 DIAGNOSIS — I47.20 VENTRICULAR TACHYARRHYTHMIA (HCC): Primary | ICD-10-CM

## 2024-12-18 DIAGNOSIS — I10 ESSENTIAL HYPERTENSION: ICD-10-CM

## 2024-12-18 PROCEDURE — 93000 ELECTROCARDIOGRAM COMPLETE: CPT | Performed by: INTERNAL MEDICINE

## 2024-12-18 PROCEDURE — 99213 OFFICE O/P EST LOW 20 MIN: CPT | Performed by: INTERNAL MEDICINE

## 2024-12-18 NOTE — PROGRESS NOTES
" Patient ID: Anibal Voss is a 59 y.o. male.        Plan:      Assessment & Plan  Ventricular tachyarrhythmia (HCC)  Mainly quiescent on metoprolol.  Essential hypertension  Recent increase in amlodipine with better control as per primary care.      Follow up Plan/Other summary comments:  At this point patient is comfortable with the fact that periodically particularly when he is anxious he has some palpitations.  In general metoprolol has been helpful.  Moving forward I will see him on an as-needed basis.    HPI: Patient seen in follow-up today regarding the above.  Since last visit generally he has felt well.  No chest pain or chest pressure.  Palpitations are uncommon.  No syncope or near syncope.    Again he came to see us because of palpitations and was found to have lots of ectopy as well as hypertension.  He has come to learn of his anxiety contributing to some of these symptoms and this cyclic nature of that anxiety.  Results for orders placed or performed in visit on 12/18/24   POCT ECG    Impression    Sinus rhythm.  Normal.         Most recent or relevant cardiac/vascular testing:     Stress echo 11/25/2019:  Within normal limits.      Past Surgical History:   Procedure Laterality Date    COLONOSCOPY N/A 9/16/2016    Procedure: COLONOSCOPY;  Surgeon: Luis M Xiao MD;  Location: [facility];  Service:     HERNIA REPAIR      umbilical    CA ANRCT XM SURG REQ ANES GENERAL SPI/EDRL DX N/A 11/4/2024    Procedure: EXAM UNDER ANESTHESIA (EUA); HEMORRHOIDECTOMY EXCISION INTERNAL AND EXTERNAL X1;  Surgeon: Luis M Xiao MD;  Location: [facility];  Service: Colorectal    SHOULDER SURGERY Right        Lipid Profile: Reviewed      Review of Systems   10  point ROS  was otherwise non pertinent or negative except as per HPI or as below.   Gait:  Normal.        Objective:     /84   Pulse 76   Ht 5' 11\" (1.803 m)   Wt 126 kg (277 lb)   BMI 38.63 kg/m²     PHYSICAL EXAM:    General:  Normal " appearance in no distress.  Eyes:  Anicteric.  Oral mucosa:  Moist.  Neck:  No JVD. Carotid upstrokes are brisk without bruits.  No masses.  Chest:  Clear to auscultation.  Cardiac:  No palpable PMI.  Normal S1 and S2.  No murmur gallop or rub.  Abdomen:  Soft and nontender. No palpable organomegaly or aortic enlargement.  Extremities:  No peripheral edema.  Musculoskeletal:  Symmetric.   Vascular:  Femoral pulses are brisk without bruits.  Popliteal pulses are intact bilaterally.   Pedal pulses are intact.  Neuro:  Grossly symmetric.  Psych:  Alert and oriented x3.      Meds reviewed.    Past Medical History:   Diagnosis Date    Bigeminy     Hypertension     Inner ear dysfunction     Irreducible umbilical hernia     last assessed 2013    Pneumonia     Ventricular tachyarrhythmia (HCC)            Social History     Tobacco Use   Smoking Status Former    Current packs/day: 0.00    Average packs/day: 1 pack/day for 15.0 years (15.0 ttl pk-yrs)    Types: Cigarettes    Start date:     Quit date:     Years since quittin.9   Smokeless Tobacco Never   Tobacco Comments    former smoker, per [employer]

## 2024-12-19 PROBLEM — Z12.5 SCREENING FOR PROSTATE CANCER: Status: RESOLVED | Noted: 2024-11-19 | Resolved: 2024-12-19

## 2024-12-19 PROBLEM — Z13.1 SCREENING FOR DIABETES MELLITUS: Status: RESOLVED | Noted: 2024-11-19 | Resolved: 2024-12-19

## 2025-01-18 DIAGNOSIS — N52.9 IMPOTENCE DUE TO ERECTILE DYSFUNCTION: ICD-10-CM

## 2025-01-20 RX ORDER — SILDENAFIL 50 MG/1
TABLET, FILM COATED ORAL
Qty: 30 TABLET | Refills: 5 | Status: SHIPPED | OUTPATIENT
Start: 2025-01-20

## 2025-05-27 ENCOUNTER — APPOINTMENT (OUTPATIENT)
Dept: LAB | Facility: CLINIC | Age: 60
End: 2025-05-27
Payer: COMMERCIAL

## 2025-05-27 ENCOUNTER — APPOINTMENT (OUTPATIENT)
Dept: RADIOLOGY | Facility: CLINIC | Age: 60
End: 2025-05-27
Payer: COMMERCIAL

## 2025-05-27 ENCOUNTER — OFFICE VISIT (OUTPATIENT)
Dept: FAMILY MEDICINE CLINIC | Facility: CLINIC | Age: 60
End: 2025-05-27
Payer: COMMERCIAL

## 2025-05-27 VITALS
OXYGEN SATURATION: 98 % | BODY MASS INDEX: 35.56 KG/M2 | SYSTOLIC BLOOD PRESSURE: 136 MMHG | DIASTOLIC BLOOD PRESSURE: 82 MMHG | TEMPERATURE: 97.5 F | WEIGHT: 254 LBS | HEIGHT: 71 IN | HEART RATE: 59 BPM

## 2025-05-27 DIAGNOSIS — M25.50 POLYARTHRALGIA: ICD-10-CM

## 2025-05-27 DIAGNOSIS — R22.0 MASS OF SCALP: Primary | ICD-10-CM

## 2025-05-27 PROBLEM — Z00.00 ANNUAL PHYSICAL EXAM: Status: RESOLVED | Noted: 2023-11-17 | Resolved: 2025-05-27

## 2025-05-27 PROCEDURE — 99213 OFFICE O/P EST LOW 20 MIN: CPT

## 2025-05-27 PROCEDURE — 73521 X-RAY EXAM HIPS BI 2 VIEWS: CPT

## 2025-05-27 PROCEDURE — 36415 COLL VENOUS BLD VENIPUNCTURE: CPT

## 2025-05-27 PROCEDURE — 86618 LYME DISEASE ANTIBODY: CPT

## 2025-05-27 RX ORDER — SULFAMETHOXAZOLE AND TRIMETHOPRIM 800; 160 MG/1; MG/1
1 TABLET ORAL 2 TIMES DAILY
Qty: 14 TABLET | Refills: 0 | Status: SHIPPED | OUTPATIENT
Start: 2025-05-27 | End: 2025-06-03

## 2025-05-27 NOTE — PROGRESS NOTES
"Name: Anibal Voss      : 1965      MRN: [id number]  Encounter Provider: Eloy Mulligan PA-C  Encounter Date: 2025   Encounter department: [facility]    Assessment & Plan  Mass of scalp  Pt c/o of a cyst like mass located posterior scalp x 1 week   has not worsened since onset  has not spread  has not attempted to alleviate their sx at this time   Described as more of a pain, questions if he may have been bitten by an insect  Pertinent negatives include no drainage  Pt. Denies recent outdoor activity, new lotions/body washes, new foods.   Exam revealing 1.5 circumscribed area of mild erythema and swelling over the scalp area  To palpation, poorly mobile loculation is noted  Suspect mild superficial infection, per pt has not noticed chronically, but consider epidermoid cyst as well    Orders:    sulfamethoxazole-trimethoprim (BACTRIM DS) 800-160 mg per tablet; Take 1 tablet by mouth in the morning and 1 tablet before bedtime. Do all this for 7 days.    Ambulatory Referral to Dermatology; Future    Polyarthralgia  At the end of the day pt notes multiple joint pains of the lower extremity, more recently the hip   Pt notes a chronic degree of hip pain, was in [employer] and routinely is physically active  Exam of the LE reveals varcosities b/l, hip testing normal negative straight leg raise  Further discussion reveals the patient states he has been bit by ticks \"multiple times\"  Do suspect arthritic change/musculoskeletal fatigue, though consideration for Lyme disease as well or underlying rheumatologic condition  Will proceed with XR b/l of the hips as well as Lyme disease testing  Advised patient close follow-up if nonimprovement or worsening, did offer patient PT referral but he states he will exercise on his own  Consideration for broader autoimmune workup if not improvement or worsening  Orders:    Lyme Total AB W Reflex to IGM/IGG; Future    XR hip/pelv 1 vw left if performed; " "Future         History of Present Illness     Anibal Voss is a 60 y.o. male  presenting with concerns of a cyst on the back of the neck which is painful for patient.  He also notes polyarthralgias of the lower extremity        **Note: Portions of the record may have been created with voice recognition software.  Occasional wrong word or \"sound alike\" substitutions may have occurred due to the inherent limitations of voice recognition software.  Please read the chart carefully and recognize, using context, where substitutions have occurred. Please contact for further clarification, when necessary.       Review of Systems   Constitutional:  Negative for chills and fever.   HENT:  Negative for ear pain and sore throat.    Eyes:  Negative for pain and visual disturbance.   Respiratory:  Negative for cough and shortness of breath.    Cardiovascular:  Negative for chest pain and palpitations.   Gastrointestinal:  Negative for abdominal pain and vomiting.   Genitourinary:  Negative for dysuria and hematuria.   Musculoskeletal:  Positive for arthralgias. Negative for back pain.   Skin:  Positive for rash. Negative for color change.   Neurological:  Negative for seizures and syncope.   All other systems reviewed and are negative.    Past Medical History[1]  Past Surgical History[2]  Family History[3]  Social History[4]  Medications[5]  No Known Allergies  Immunization History   Administered Date(s) Administered    COVID-19 MODERNA VACC 0.5 ML IM 12/24/2021    COVID-19 PFIZER VACCINE 0.3 ML IM 04/06/2021, 04/27/2021    Tdap 06/11/2015, 02/04/2016     Objective   /82   Pulse 59   Temp 97.5 °F (36.4 °C)   Ht 5' 11\" (1.803 m)   Wt 115 kg (254 lb)   SpO2 98%   BMI 35.43 kg/m²     Physical Exam  Vitals and nursing note reviewed.   Constitutional:       General: He is not in acute distress.     Appearance: He is well-developed.   HENT:      Head: Normocephalic and atraumatic.     Eyes:      Conjunctiva/sclera: " Conjunctivae normal.       Cardiovascular:      Rate and Rhythm: Normal rate and regular rhythm.      Heart sounds: No murmur heard.  Pulmonary:      Effort: Pulmonary effort is normal. No respiratory distress.      Breath sounds: Normal breath sounds.   Abdominal:      General: Abdomen is flat.     Musculoskeletal:         General: No swelling, tenderness, deformity or signs of injury.      Cervical back: Neck supple.     Skin:     General: Skin is warm and dry.      Findings: Lesion (scalp) present.     Neurological:      Mental Status: He is alert and oriented to person, place, and time.     Psychiatric:         Mood and Affect: Mood normal.                [1]   Past Medical History:  Diagnosis Date    Bigeminy     Hypertension     Inner ear dysfunction     Irreducible umbilical hernia     last assessed 2013    Pneumonia     Ventricular tachyarrhythmia (HCC)    [2]   Past Surgical History:  Procedure Laterality Date    COLONOSCOPY N/A 2016    Procedure: COLONOSCOPY;  Surgeon: Luis M Xiao MD;  Location[facility];  Service:     HERNIA REPAIR      umbilical    AK ANRCT XM SURG REQ ANES GENERAL SPI/EDRL DX N/A 2024    Procedure: EXAM UNDER ANESTHESIA (EUA); HEMORRHOIDECTOMY EXCISION INTERNAL AND EXTERNAL X1;  Surgeon: Luis M Xiao MD;  [facility];  Service: Colorectal    SHOULDER SURGERY Right    [3]   Family History  Adopted: Yes   Family history unknown: Yes   [4]   Social History  Tobacco Use    Smoking status: Former     Current packs/day: 0.00     Average packs/day: 1 pack/day for 15.0 years (15.0 ttl pk-yrs)     Types: Cigarettes     Start date:      Quit date:      Years since quittin.4    Smokeless tobacco: Never    Tobacco comments:     former smoker, per [employer]   Vaping Use    Vaping status: Never Used   Substance and Sexual Activity    Alcohol use: Not Currently     Alcohol/week: 30.0 standard drinks of alcohol     Types: 30 Cans of beer per week      Comment: being a social drinker, per [employer]    Drug use: Yes     Types: Marijuana     Comment: rare    Sexual activity: Yes   [5]   Current Outpatient Medications on File Prior to Visit   Medication Sig    amLODIPine (NORVASC) 10 mg tablet Take 1 tablet (10 mg total) by mouth daily    losartan-hydrochlorothiazide (HYZAAR) 50-12.5 mg per tablet Take 1 tablet by mouth daily    metoprolol succinate (TOPROL-XL) 100 mg 24 hr tablet Take 1 tablet (100 mg total) by mouth daily    sildenafil (VIAGRA) 50 MG tablet take 1 tablet by mouth if needed for ERECTILE DYSFUNCTION

## 2025-05-28 ENCOUNTER — RESULTS FOLLOW-UP (OUTPATIENT)
Dept: FAMILY MEDICINE CLINIC | Facility: CLINIC | Age: 60
End: 2025-05-28

## 2025-05-28 LAB — B BURGDOR IGG+IGM SER QL IA: NEGATIVE

## (undated) DEVICE — DECANTER: Brand: UNBRANDED

## (undated) DEVICE — STERILE LUBRICATING JELLY, TUBE: Brand: HR LUBRICATING JELLY

## (undated) DEVICE — 3M™ DURAPORE™ SURGICAL TAPE 1538-3, 3 INCH X 10 YARD (7,5CM X 9,1M), 4 ROLLS/BOX: Brand: 3M™ DURAPORE™

## (undated) DEVICE — DISPOSABLE BRIEF/UNDERWEAR

## (undated) DEVICE — GLOVE INDICATOR PI UNDERGLOVE SZ 7.5 BLUE

## (undated) DEVICE — INTENDED FOR TISSUE SEPARATION, AND OTHER PROCEDURES THAT REQUIRE A SHARP SURGICAL BLADE TO PUNCTURE OR CUT.: Brand: BARD-PARKER SAFETY BLADES SIZE 15, STERILE

## (undated) DEVICE — PLUMEPEN PRO 10FT

## (undated) DEVICE — POOLE SUCTION HANDLE: Brand: CARDINAL HEALTH

## (undated) DEVICE — BETHLEHEM UNIVERSAL MINOR GEN: Brand: CARDINAL HEALTH

## (undated) DEVICE — GLOVE SRG BIOGEL 7.5

## (undated) DEVICE — ASTOUND STANDARD SURGICAL GOWN, XL: Brand: CONVERTORS

## (undated) DEVICE — BASIC SINGLE BASIN 2-LF: Brand: MEDLINE INDUSTRIES, INC.

## (undated) DEVICE — SURGIFOAM 8.5 X 12.5

## (undated) DEVICE — TUBING SUCTION 5MM X 12 FT

## (undated) DEVICE — ABDOMINAL PAD: Brand: DERMACEA

## (undated) DEVICE — SUT VICRYL 0 UR-6 27 IN J603H

## (undated) DEVICE — GAUZE SPONGES,16 PLY: Brand: CURITY

## (undated) DEVICE — NEEDLE HYPO 23G X 1-1/2 IN

## (undated) DEVICE — SPONGE STICK WITH PVP-I: Brand: KENDALL

## (undated) DEVICE — ANTIBACTERIAL UNDYED BRAIDED (POLYGLACTIN 910), SYNTHETIC ABSORBABLE SUTURE: Brand: COATED VICRYL